# Patient Record
Sex: MALE | NOT HISPANIC OR LATINO
[De-identification: names, ages, dates, MRNs, and addresses within clinical notes are randomized per-mention and may not be internally consistent; named-entity substitution may affect disease eponyms.]

---

## 2017-04-19 ENCOUNTER — RX RENEWAL (OUTPATIENT)
Age: 62
End: 2017-04-19

## 2017-05-19 ENCOUNTER — APPOINTMENT (OUTPATIENT)
Dept: RHEUMATOLOGY | Facility: CLINIC | Age: 62
End: 2017-05-19

## 2017-05-19 VITALS
DIASTOLIC BLOOD PRESSURE: 98 MMHG | SYSTOLIC BLOOD PRESSURE: 144 MMHG | BODY MASS INDEX: 31.24 KG/M2 | OXYGEN SATURATION: 98 % | HEART RATE: 86 BPM | HEIGHT: 64 IN | WEIGHT: 183 LBS

## 2017-05-19 DIAGNOSIS — K05.10 CHRONIC GINGIVITIS, PLAQUE INDUCED: ICD-10-CM

## 2017-05-19 LAB
BASOPHILS # BLD AUTO: 0.03 K/UL
BASOPHILS NFR BLD AUTO: 0.5 %
EOSINOPHIL # BLD AUTO: 0.04 K/UL
EOSINOPHIL NFR BLD AUTO: 0.6 %
ERYTHROCYTE [SEDIMENTATION RATE] IN BLOOD BY WESTERGREN METHOD: 44 MM/HR
HCT VFR BLD CALC: 34.5 %
HGB BLD-MCNC: 10.4 G/DL
IMM GRANULOCYTES NFR BLD AUTO: 0.2 %
LYMPHOCYTES # BLD AUTO: 1.57 K/UL
LYMPHOCYTES NFR BLD AUTO: 24.8 %
MAN DIFF?: NORMAL
MCHC RBC-ENTMCNC: 23 PG
MCHC RBC-ENTMCNC: 30.1 GM/DL
MCV RBC AUTO: 76.2 FL
MONOCYTES # BLD AUTO: 0.49 K/UL
MONOCYTES NFR BLD AUTO: 7.8 %
NEUTROPHILS # BLD AUTO: 4.18 K/UL
NEUTROPHILS NFR BLD AUTO: 66.1 %
PLATELET # BLD AUTO: 197 K/UL
RBC # BLD: 4.53 M/UL
RBC # FLD: 17.9 %
WBC # FLD AUTO: 6.32 K/UL

## 2017-05-20 LAB
ALBUMIN SERPL ELPH-MCNC: 4.7 G/DL
ALP BLD-CCNC: 92 U/L
ALT SERPL-CCNC: 40 U/L
ANION GAP SERPL CALC-SCNC: 19 MMOL/L
AST SERPL-CCNC: 70 U/L
BILIRUB SERPL-MCNC: 0.5 MG/DL
BUN SERPL-MCNC: 10 MG/DL
CALCIUM SERPL-MCNC: 9.8 MG/DL
CHLORIDE SERPL-SCNC: 102 MMOL/L
CO2 SERPL-SCNC: 22 MMOL/L
CREAT SERPL-MCNC: 0.77 MG/DL
CRP SERPL-MCNC: 0.5 MG/DL
GLUCOSE SERPL-MCNC: 86 MG/DL
POTASSIUM SERPL-SCNC: 4.5 MMOL/L
PROT SERPL-MCNC: 8.3 G/DL
SODIUM SERPL-SCNC: 143 MMOL/L

## 2017-06-07 ENCOUNTER — RX RENEWAL (OUTPATIENT)
Age: 62
End: 2017-06-07

## 2017-06-07 LAB
ALBUMIN SERPL ELPH-MCNC: 4.1 G/DL
ALP BLD-CCNC: 86 U/L
ALT SERPL-CCNC: 39 U/L
AST SERPL-CCNC: 62 U/L
BILIRUB DIRECT SERPL-MCNC: 0.1 MG/DL
BILIRUB INDIRECT SERPL-MCNC: 0.2 MG/DL
BILIRUB SERPL-MCNC: 0.3 MG/DL
GGT SERPL-CCNC: 77 U/L
HAV IGM SER QL: NONREACTIVE
HBV CORE IGM SER QL: NONREACTIVE
HBV SURFACE AG SER QL: NONREACTIVE
HCV AB SER QL: NONREACTIVE
HCV S/CO RATIO: 0.2 S/CO
PROT SERPL-MCNC: 7.6 G/DL

## 2017-06-09 LAB
MITOCHONDRIA AB SER IF-ACNC: NORMAL
SMOOTH MUSCLE AB SER QL IF: NORMAL

## 2017-06-20 ENCOUNTER — RX RENEWAL (OUTPATIENT)
Age: 62
End: 2017-06-20

## 2017-06-21 ENCOUNTER — APPOINTMENT (OUTPATIENT)
Dept: ULTRASOUND IMAGING | Facility: CLINIC | Age: 62
End: 2017-06-21

## 2017-06-21 ENCOUNTER — OUTPATIENT (OUTPATIENT)
Dept: OUTPATIENT SERVICES | Facility: HOSPITAL | Age: 62
LOS: 1 days | End: 2017-06-21
Payer: COMMERCIAL

## 2017-06-21 DIAGNOSIS — Z00.8 ENCOUNTER FOR OTHER GENERAL EXAMINATION: ICD-10-CM

## 2017-06-21 PROCEDURE — 76700 US EXAM ABDOM COMPLETE: CPT

## 2017-11-27 ENCOUNTER — RX RENEWAL (OUTPATIENT)
Age: 62
End: 2017-11-27

## 2017-12-01 ENCOUNTER — APPOINTMENT (OUTPATIENT)
Dept: RHEUMATOLOGY | Facility: CLINIC | Age: 62
End: 2017-12-01

## 2017-12-06 ENCOUNTER — RX RENEWAL (OUTPATIENT)
Age: 62
End: 2017-12-06

## 2018-03-06 ENCOUNTER — OTHER (OUTPATIENT)
Age: 63
End: 2018-03-06

## 2018-05-18 ENCOUNTER — LABORATORY RESULT (OUTPATIENT)
Age: 63
End: 2018-05-18

## 2018-05-18 ENCOUNTER — APPOINTMENT (OUTPATIENT)
Dept: RHEUMATOLOGY | Facility: CLINIC | Age: 63
End: 2018-05-18
Payer: COMMERCIAL

## 2018-05-18 VITALS
SYSTOLIC BLOOD PRESSURE: 120 MMHG | OXYGEN SATURATION: 97 % | WEIGHT: 185 LBS | HEART RATE: 67 BPM | HEIGHT: 64 IN | BODY MASS INDEX: 31.58 KG/M2 | DIASTOLIC BLOOD PRESSURE: 70 MMHG

## 2018-05-18 PROCEDURE — 99214 OFFICE O/P EST MOD 30 MIN: CPT

## 2018-05-29 LAB
ALBUMIN SERPL ELPH-MCNC: 4.5 G/DL
ALP BLD-CCNC: 67 U/L
ALT SERPL-CCNC: 53 U/L
ANION GAP SERPL CALC-SCNC: 18 MMOL/L
AST SERPL-CCNC: 91 U/L
BASOPHILS # BLD AUTO: 0.17 K/UL
BASOPHILS NFR BLD AUTO: 3.8 %
BILIRUB SERPL-MCNC: 0.4 MG/DL
BUN SERPL-MCNC: 11 MG/DL
CALCIUM SERPL-MCNC: 9.2 MG/DL
CHLORIDE SERPL-SCNC: 102 MMOL/L
CO2 SERPL-SCNC: 22 MMOL/L
CREAT SERPL-MCNC: 0.78 MG/DL
CRP SERPL-MCNC: 0.2 MG/DL
EOSINOPHIL # BLD AUTO: 0.13 K/UL
EOSINOPHIL NFR BLD AUTO: 2.8 %
ERYTHROCYTE [SEDIMENTATION RATE] IN BLOOD BY WESTERGREN METHOD: 26 MM/HR
GLUCOSE SERPL-MCNC: 86 MG/DL
HCT VFR BLD CALC: 29.2 %
HGB BLD-MCNC: 8.1 G/DL
LYMPHOCYTES # BLD AUTO: 0.86 K/UL
LYMPHOCYTES NFR BLD AUTO: 18.9 %
MAN DIFF?: NORMAL
MCHC RBC-ENTMCNC: 18.5 PG
MCHC RBC-ENTMCNC: 27.7 GM/DL
MCV RBC AUTO: 66.7 FL
MONOCYTES # BLD AUTO: 0.3 K/UL
MONOCYTES NFR BLD AUTO: 6.6 %
NEUTROPHILS # BLD AUTO: 3.1 K/UL
NEUTROPHILS NFR BLD AUTO: 67.9 %
PLATELET # BLD AUTO: 196 K/UL
POTASSIUM SERPL-SCNC: 4.2 MMOL/L
PROT SERPL-MCNC: 7.8 G/DL
RBC # BLD: 4.38 M/UL
RBC # FLD: 19 %
SODIUM SERPL-SCNC: 142 MMOL/L
WBC # FLD AUTO: 4.57 K/UL

## 2018-06-13 ENCOUNTER — LABORATORY RESULT (OUTPATIENT)
Age: 63
End: 2018-06-13

## 2018-06-14 LAB
BASOPHILS # BLD AUTO: 0 K/UL
BASOPHILS NFR BLD AUTO: 0 %
EOSINOPHIL # BLD AUTO: 0.09 K/UL
EOSINOPHIL NFR BLD AUTO: 1.7 %
HCT VFR BLD CALC: 29.7 %
HGB BLD-MCNC: 8.2 G/DL
LYMPHOCYTES # BLD AUTO: 0.86 K/UL
LYMPHOCYTES NFR BLD AUTO: 15.7 %
MAN DIFF?: NORMAL
MCHC RBC-ENTMCNC: 18.2 PG
MCHC RBC-ENTMCNC: 27.6 GM/DL
MCV RBC AUTO: 66 FL
MONOCYTES # BLD AUTO: 0.33 K/UL
MONOCYTES NFR BLD AUTO: 6.1 %
NEUTROPHILS # BLD AUTO: 4.13 K/UL
NEUTROPHILS NFR BLD AUTO: 75.6 %
PLATELET # BLD AUTO: 170 K/UL
RBC # BLD: 4.5 M/UL
RBC # FLD: 19.5 %
WBC # FLD AUTO: 5.46 K/UL

## 2018-07-16 ENCOUNTER — RX RENEWAL (OUTPATIENT)
Age: 63
End: 2018-07-16

## 2019-02-04 ENCOUNTER — RX RENEWAL (OUTPATIENT)
Age: 64
End: 2019-02-04

## 2019-05-06 ENCOUNTER — RX RENEWAL (OUTPATIENT)
Age: 64
End: 2019-05-06

## 2019-06-03 ENCOUNTER — APPOINTMENT (OUTPATIENT)
Dept: RHEUMATOLOGY | Facility: CLINIC | Age: 64
End: 2019-06-03
Payer: COMMERCIAL

## 2019-06-03 ENCOUNTER — LABORATORY RESULT (OUTPATIENT)
Age: 64
End: 2019-06-03

## 2019-06-03 VITALS
HEART RATE: 70 BPM | DIASTOLIC BLOOD PRESSURE: 75 MMHG | WEIGHT: 185 LBS | SYSTOLIC BLOOD PRESSURE: 133 MMHG | HEIGHT: 64 IN | OXYGEN SATURATION: 96 % | BODY MASS INDEX: 31.58 KG/M2

## 2019-06-03 PROCEDURE — 36415 COLL VENOUS BLD VENIPUNCTURE: CPT

## 2019-06-03 PROCEDURE — 99214 OFFICE O/P EST MOD 30 MIN: CPT | Mod: 25

## 2019-06-04 ENCOUNTER — RX RENEWAL (OUTPATIENT)
Age: 64
End: 2019-06-04

## 2019-06-04 LAB
25(OH)D3 SERPL-MCNC: 8.8 NG/ML
ALBUMIN SERPL ELPH-MCNC: 4.6 G/DL
ALP BLD-CCNC: 105 U/L
ALT SERPL-CCNC: 39 U/L
ANION GAP SERPL CALC-SCNC: 14 MMOL/L
AST SERPL-CCNC: 82 U/L
BASOPHILS # BLD AUTO: 0.12 K/UL
BASOPHILS NFR BLD AUTO: 2.7 %
BILIRUB SERPL-MCNC: 0.2 MG/DL
BUN SERPL-MCNC: 10 MG/DL
CALCIUM SERPL-MCNC: 9.4 MG/DL
CHLORIDE SERPL-SCNC: 104 MMOL/L
CO2 SERPL-SCNC: 23 MMOL/L
CREAT SERPL-MCNC: 0.6 MG/DL
CRP SERPL-MCNC: 0.43 MG/DL
EOSINOPHIL # BLD AUTO: 0.04 K/UL
EOSINOPHIL NFR BLD AUTO: 0.9 %
ERYTHROCYTE [SEDIMENTATION RATE] IN BLOOD BY WESTERGREN METHOD: 41 MM/HR
GLUCOSE SERPL-MCNC: 93 MG/DL
HAV IGM SER QL: NONREACTIVE
HBV CORE IGM SER QL: NONREACTIVE
HBV SURFACE AG SER QL: NONREACTIVE
HCT VFR BLD CALC: 32.6 %
HCV AB SER QL: NONREACTIVE
HCV S/CO RATIO: 0.23 S/CO
HGB BLD-MCNC: 9.7 G/DL
LYMPHOCYTES # BLD AUTO: 1.14 K/UL
LYMPHOCYTES NFR BLD AUTO: 26.5 %
MAN DIFF?: NORMAL
MCHC RBC-ENTMCNC: 22.7 PG
MCHC RBC-ENTMCNC: 29.8 GM/DL
MCV RBC AUTO: 76.3 FL
MONOCYTES # BLD AUTO: 0.23 K/UL
MONOCYTES NFR BLD AUTO: 5.3 %
NEUTROPHILS # BLD AUTO: 2.78 K/UL
NEUTROPHILS NFR BLD AUTO: 64.6 %
PLATELET # BLD AUTO: 178 K/UL
POTASSIUM SERPL-SCNC: 4.1 MMOL/L
PROT SERPL-MCNC: 7.5 G/DL
RBC # BLD: 4.27 M/UL
RBC # FLD: 20.2 %
SODIUM SERPL-SCNC: 141 MMOL/L
URATE SERPL-MCNC: 5.5 MG/DL
WBC # FLD AUTO: 4.31 K/UL

## 2019-06-04 NOTE — HISTORY OF PRESENT ILLNESS
[FreeTextEntry1] : f/u 1 year\par Dandy is a 61-year-old patient, with  diagnosis of psoriatic arthritis. He is a pt of Dr. Moran. He preferred to stay away from the methotrexate and preferred using a more benign drug like sulfasalazine. He Is on combination of SSZ and otezla. \par He feels that otezla has worked very well for him.   He rates his joint pain 1/10 but less than 15 minutes of morning stiffness and no fatigue.  He prefers to stay on the sulfasalazine because he tried to taper it down to 2 and a joint pain began to return.\par He has seen GI and had a recent colonoscopy which as per the pt was normal. \par He c/o pain in his right wrist, he thinks it was swollen. He is now working locally and is happy about that. \par He has cut down on mobic and use sit sparingly. On my initial evaluation I noted that his ALT was elevated, his GGT was elevated, he did see his primary care physician at that time and had a sonogram of the liver, he states he was told he has a fatty liver. He does admit to having 2 alcoholic beverages, usually whiskey every night.\par \par He denies prolonged morning stiffness. He denies any other issues with his help. He has been doing well otherwise\par He has a good appetite and denies wt loss.

## 2019-06-04 NOTE — PHYSICAL EXAM
[General Appearance - In No Acute Distress] : in no acute distress [General Appearance - Alert] : alert [Sclera] : the sclera and conjunctiva were normal [PERRL With Normal Accommodation] : pupils were equal in size, round, and reactive to light [Extraocular Movements] : extraocular movements were intact [Outer Ear] : the ears and nose were normal in appearance [Oropharynx] : the oropharynx was normal [Neck Cervical Mass (___cm)] : no neck mass was observed [Neck Appearance] : the appearance of the neck was normal [Jugular Venous Distention Increased] : there was no jugular-venous distention [Thyroid Diffuse Enlargement] : the thyroid was not enlarged [Thyroid Nodule] : there were no palpable thyroid nodules [Auscultation Breath Sounds / Voice Sounds] : lungs were clear to auscultation bilaterally [Heart Rate And Rhythm] : heart rate was normal and rhythm regular [Heart Sounds Gallop] : no gallops [Heart Sounds] : normal S1 and S2 [Murmurs] : no murmurs [Heart Sounds Pericardial Friction Rub] : no pericardial rub [Edema] : there was no peripheral edema [Full Pulse] : the pedal pulses are present [Bowel Sounds] : normal bowel sounds [Abdomen Soft] : soft [Abdomen Tenderness] : non-tender [] : no hepato-splenomegaly [Cervical Lymph Nodes Enlarged Posterior Bilaterally] : posterior cervical [Abdomen Mass (___ Cm)] : no abdominal mass palpated [Cervical Lymph Nodes Enlarged Anterior Bilaterally] : anterior cervical [Supraclavicular Lymph Nodes Enlarged Bilaterally] : supraclavicular [No CVA Tenderness] : no ~M costovertebral angle tenderness [Musculoskeletal - Swelling] : no joint swelling seen [FreeTextEntry1] : + psoriasis elbows. No nail pitting [Sensation] : the sensory exam was normal to light touch and pinprick [Motor Exam] : the motor exam was normal [No Focal Deficits] : no focal deficits [Oriented To Time, Place, And Person] : oriented to person, place, and time [Impaired Insight] : insight and judgment were intact [Affect] : the affect was normal

## 2019-06-04 NOTE — REVIEW OF SYSTEMS
[Fever] : no fever [Chills] : no chills [Feeling Poorly] : not feeling poorly [Feeling Tired] : not feeling tired [Recent Weight Gain (___ Lbs)] : no recent weight gain [Recent Weight Loss (___ Lbs)] : no recent weight loss [Eye Pain] : no eye pain [Red Eyes] : eyes not red [Eyesight Problems] : no eyesight problems [Discharge From Eyes] : no purulent discharge from the eyes [Dry Eyes] : no dryness of the eyes [Eyes Itch] : no itching of the eyes [Earache] : no earache [Loss Of Hearing] : no hearing loss [Heart Rate Is Slow] : the heart rate was not slow [Heart Rate Is Fast] : the heart rate was not fast [Chest Pain] : no chest pain [Palpitations] : no palpitations [Leg Claudication] : no intermittent leg claudication [Lower Ext Edema] : no extremity edema [Shortness Of Breath] : no shortness of breath [Cough] : no cough [Wheezing] : no wheezing [SOB on Exertion] : no shortness of breath during exertion [PND] : no PND [Orthopnea] : no orthopnea [Abdominal Pain] : abdominal pain [Vomiting] : no vomiting [Diarrhea] : diarrhea [Constipation] : no constipation [Heartburn] : no heartburn [Melena] : no melena [Arthralgias] : arthralgias [Joint Pain] : no joint pain [Joint Swelling] : no joint swelling [Joint Stiffness] : no joint stiffness [Itching] : no itching [Negative] : Heme/Lymph [de-identified] : much improved. Psoriasis

## 2019-06-04 NOTE — ASSESSMENT
[FreeTextEntry1] : 63 year old male with PSA presenting for routine visit today.\par \par PSA-\par he is on a combination of SSZ and otezla. Labs to be done today. He is using SSZ at 1 gm and otezla bid. Labs to be done today. He has joint activity today and his right wrist is active, to have xray as well. Will consider switching meds. \par \par Gingivitis-\par he is on prophylactic doxycycline\par \par NSAID use-\par Risks and benefits of NSAIDS were d/w patient including but not limited to hypertension, nephrotoxicity, GI intolerance , increased risk for thromboembolism, CHF, hepatotoxicity and anemia\par \par ELEvated LFTs-\par as per pt he was dx with fatty liver, he does consume ETOH on a daily basis. \par \par Anemia-\par he has ch anemia, states that he has recently had GI w/u I have requested records. If need be will obtain hematology input as well. \par \par f/u 3 months. He is aware to call if his sx worsen.

## 2019-06-05 LAB
M TB IFN-G BLD-IMP: NEGATIVE
QUANTIFERON TB PLUS MITOGEN MINUS NIL: 4.29 IU/ML
QUANTIFERON TB PLUS NIL: 0.05 IU/ML
QUANTIFERON TB PLUS TB1 MINUS NIL: 0 IU/ML
QUANTIFERON TB PLUS TB2 MINUS NIL: 0 IU/ML

## 2019-06-09 ENCOUNTER — FORM ENCOUNTER (OUTPATIENT)
Age: 64
End: 2019-06-09

## 2019-06-10 ENCOUNTER — APPOINTMENT (OUTPATIENT)
Dept: RADIOLOGY | Facility: CLINIC | Age: 64
End: 2019-06-10
Payer: COMMERCIAL

## 2019-06-10 ENCOUNTER — OUTPATIENT (OUTPATIENT)
Dept: OUTPATIENT SERVICES | Facility: HOSPITAL | Age: 64
LOS: 1 days | End: 2019-06-10
Payer: COMMERCIAL

## 2019-06-10 DIAGNOSIS — Z00.8 ENCOUNTER FOR OTHER GENERAL EXAMINATION: ICD-10-CM

## 2019-06-10 PROCEDURE — 73110 X-RAY EXAM OF WRIST: CPT

## 2019-06-10 PROCEDURE — 71046 X-RAY EXAM CHEST 2 VIEWS: CPT

## 2019-06-10 PROCEDURE — 73110 X-RAY EXAM OF WRIST: CPT | Mod: 26,RT

## 2019-06-10 PROCEDURE — 71046 X-RAY EXAM CHEST 2 VIEWS: CPT | Mod: 26

## 2019-07-19 ENCOUNTER — MOBILE ON CALL (OUTPATIENT)
Age: 64
End: 2019-07-19

## 2019-07-22 ENCOUNTER — RX RENEWAL (OUTPATIENT)
Age: 64
End: 2019-07-22

## 2019-08-13 ENCOUNTER — MEDICATION RENEWAL (OUTPATIENT)
Age: 64
End: 2019-08-13

## 2019-09-09 ENCOUNTER — APPOINTMENT (OUTPATIENT)
Dept: RHEUMATOLOGY | Facility: CLINIC | Age: 64
End: 2019-09-09
Payer: COMMERCIAL

## 2019-09-09 VITALS
BODY MASS INDEX: 29.37 KG/M2 | OXYGEN SATURATION: 97 % | HEIGHT: 64 IN | WEIGHT: 172 LBS | SYSTOLIC BLOOD PRESSURE: 143 MMHG | HEART RATE: 68 BPM | DIASTOLIC BLOOD PRESSURE: 81 MMHG

## 2019-09-09 PROCEDURE — 99214 OFFICE O/P EST MOD 30 MIN: CPT

## 2019-09-09 NOTE — PHYSICAL EXAM
[General Appearance - Alert] : alert [General Appearance - In No Acute Distress] : in no acute distress [Sclera] : the sclera and conjunctiva were normal [PERRL With Normal Accommodation] : pupils were equal in size, round, and reactive to light [Extraocular Movements] : extraocular movements were intact [Outer Ear] : the ears and nose were normal in appearance [Oropharynx] : the oropharynx was normal [Neck Appearance] : the appearance of the neck was normal [Neck Cervical Mass (___cm)] : no neck mass was observed [Jugular Venous Distention Increased] : there was no jugular-venous distention [Thyroid Diffuse Enlargement] : the thyroid was not enlarged [Thyroid Nodule] : there were no palpable thyroid nodules [Auscultation Breath Sounds / Voice Sounds] : lungs were clear to auscultation bilaterally [Heart Rate And Rhythm] : heart rate was normal and rhythm regular [Heart Sounds] : normal S1 and S2 [Heart Sounds Gallop] : no gallops [Murmurs] : no murmurs [Heart Sounds Pericardial Friction Rub] : no pericardial rub [Full Pulse] : the pedal pulses are present [Edema] : there was no peripheral edema [Bowel Sounds] : normal bowel sounds [Abdomen Soft] : soft [Abdomen Tenderness] : non-tender [] : no hepato-splenomegaly [Abdomen Mass (___ Cm)] : no abdominal mass palpated [Cervical Lymph Nodes Enlarged Posterior Bilaterally] : posterior cervical [Cervical Lymph Nodes Enlarged Anterior Bilaterally] : anterior cervical [Supraclavicular Lymph Nodes Enlarged Bilaterally] : supraclavicular [No CVA Tenderness] : no ~M costovertebral angle tenderness [Musculoskeletal - Swelling] : no joint swelling seen [FreeTextEntry1] : + psoriasis elbows. No nail pitting [Sensation] : the sensory exam was normal to light touch and pinprick [Motor Exam] : the motor exam was normal [No Focal Deficits] : no focal deficits [Oriented To Time, Place, And Person] : oriented to person, place, and time [Impaired Insight] : insight and judgment were intact [Affect] : the affect was normal

## 2019-09-09 NOTE — ASSESSMENT
[FreeTextEntry1] : 64 year old male with PSA presenting for routine visit today.\par \par PSA-\par he is on a combination of SSZ and otezla. Labs to be done today. He is using SSZ at 1 gm and otezla bid. Labs to be done today. He has joint activity today and his right wrist is active, he defers switching meds\par \par Gingivitis-\par he is on prophylactic doxycycline\par \par NSAID use-\par Risks and benefits of NSAIDS were d/w patient including but not limited to hypertension, nephrotoxicity, GI intolerance , increased risk for thromboembolism, CHF, hepatotoxicity and anemia\par \par ELEvated LFTs-\par as per pt he was dx with fatty liver, he does consume ETOH on a daily basis. \par \par Anemia-\par he has ch anemia, states that he has recently had GI w/u which was normal. he may have thalessemia trait based on low MCV, to see hematology for completion. \par \par f/u 4 months. He is aware to call if his sx worsen.

## 2019-09-09 NOTE — REVIEW OF SYSTEMS
[Fever] : no fever [Chills] : no chills [Feeling Poorly] : not feeling poorly [Feeling Tired] : not feeling tired [Recent Weight Gain (___ Lbs)] : no recent weight gain [Recent Weight Loss (___ Lbs)] : no recent weight loss [Eye Pain] : no eye pain [Red Eyes] : eyes not red [Eyesight Problems] : no eyesight problems [Discharge From Eyes] : no purulent discharge from the eyes [Dry Eyes] : no dryness of the eyes [Eyes Itch] : no itching of the eyes [Earache] : no earache [Loss Of Hearing] : no hearing loss [Heart Rate Is Slow] : the heart rate was not slow [Heart Rate Is Fast] : the heart rate was not fast [Chest Pain] : no chest pain [Palpitations] : no palpitations [Leg Claudication] : no intermittent leg claudication [Lower Ext Edema] : no extremity edema [Shortness Of Breath] : no shortness of breath [Wheezing] : no wheezing [Cough] : no cough [SOB on Exertion] : no shortness of breath during exertion [Orthopnea] : no orthopnea [PND] : no PND [Abdominal Pain] : abdominal pain [Vomiting] : no vomiting [Constipation] : no constipation [Diarrhea] : diarrhea [Heartburn] : no heartburn [Melena] : no melena [Arthralgias] : arthralgias [Joint Pain] : no joint pain [Joint Swelling] : no joint swelling [Joint Stiffness] : no joint stiffness [Itching] : no itching [Negative] : Heme/Lymph [de-identified] : much improved. Psoriasis

## 2019-09-09 NOTE — HISTORY OF PRESENT ILLNESS
[FreeTextEntry1] : f/u 3 months\par Dandy is a 64-year-old patient, with  diagnosis of psoriatic arthritis. He is a pt of Dr. Moran. He preferred to stay away from the methotrexate and preferred using a more benign drug like sulfasalazine. He Is on combination of SSZ and otezla. \par He feels that otezla has worked very well for him.   He rates his joint pain 1/10 but less than 15 minutes of morning stiffness and no fatigue.  He prefers to stay on the sulfasalazine because he tried to taper it down to 2 and a joint pain began to return.\par He has seen GI and had a recent colonoscopy which as per the pt was normal. \par He c/o pain in his right wrist, but it is manageable He is now working locally and is happy about that. \par He has cut down on mobic and use sit sparingly. On my initial evaluation I noted that his ALT was elevated, his GGT was elevated, he did see his primary care physician at that time and had a sonogram of the liver, he states he was told he has a fatty liver. He does admit to having 2 alcoholic beverages, usually whiskey every night.\par \par He denies prolonged morning stiffness. He denies any other issues with his help. He has been doing well otherwise\par He has a good appetite and denies wt loss.

## 2019-09-23 ENCOUNTER — APPOINTMENT (OUTPATIENT)
Dept: RHEUMATOLOGY | Facility: CLINIC | Age: 64
End: 2019-09-23

## 2019-10-17 ENCOUNTER — APPOINTMENT (OUTPATIENT)
Age: 64
End: 2019-10-17
Payer: COMMERCIAL

## 2019-10-17 VITALS
BODY MASS INDEX: 30.05 KG/M2 | SYSTOLIC BLOOD PRESSURE: 172 MMHG | WEIGHT: 176 LBS | RESPIRATION RATE: 16 BRPM | HEIGHT: 64 IN | TEMPERATURE: 98.6 F | HEART RATE: 76 BPM | DIASTOLIC BLOOD PRESSURE: 81 MMHG

## 2019-10-17 DIAGNOSIS — Z87.891 PERSONAL HISTORY OF NICOTINE DEPENDENCE: ICD-10-CM

## 2019-10-17 PROCEDURE — 99244 OFF/OP CNSLTJ NEW/EST MOD 40: CPT

## 2019-10-17 NOTE — REASON FOR VISIT
[Initial Consultation] : an initial consultation for [FreeTextEntry2] : microcytic anemia; rule out thalassemia

## 2019-10-17 NOTE — CONSULT LETTER
[Consult Letter:] : I had the pleasure of evaluating your patient, [unfilled]. [Dear  ___] : Dear  [unfilled], [Consult Closing:] : Thank you very much for allowing me to participate in the care of this patient.  If you have any questions, please do not hesitate to contact me. [Please see my note below.] : Please see my note below. [Sincerely,] : Sincerely, [FreeTextEntry2] : Chavez Mcdonald D.O.  [FreeTextEntry3] : MART PERALTA M.D.\par Hematology/ Oncology\par [facility] [place] [facility] [place]\par [facility]\par [address] [place] [address], [facility]\par [place] [address]\par Telephone: [phone]\par FAX: [phone]\par \par \par

## 2019-10-17 NOTE — ASSESSMENT
[FreeTextEntry1] : Mr. MARTINI and his spouse 's questions were answered to their satisfaction. He  expressed his  understanding and willingness to comply with the above recommendations, and  will return to the office to review the results of the blood tests in 2 weeks.\par \par

## 2019-10-17 NOTE — PHYSICAL EXAM
[Fully active, able to carry on all pre-disease performance without restriction] : Status 0 - Fully active, able to carry on all pre-disease performance without restriction [Normal] : affect appropriate [de-identified] : left hand arthritic mild deformities

## 2019-10-17 NOTE — HISTORY OF PRESENT ILLNESS
[de-identified] : 64 M with history of psoriatic arthritis since age 54, with previous exposure to methotrexate, currently on combination treatment with Sulfasalazine and Otezla. Though his arthritic pain is under control, patient presents with microcytic anemia (hemoglobin fluctuant between 8.2 and 10.4 g/dL, and MCV between 66.0 and 76.3). No ferritin available for review however patient presents with slight elevation in SGOT (62-91) since May 2017. Sonogram of the liver consistent with hepatosteatosis. Renal profile is in normal range. Recent colonoscopy was normal. Patient's ethnic background is [place] and [place]. Denies family history of anemia. [FreeTextEntry1] : expectant surveillance\par \par \par

## 2019-10-18 ENCOUNTER — LABORATORY RESULT (OUTPATIENT)
Age: 64
End: 2019-10-18

## 2019-10-24 ENCOUNTER — OUTPATIENT (OUTPATIENT)
Dept: OUTPATIENT SERVICES | Facility: HOSPITAL | Age: 64
LOS: 1 days | End: 2019-10-24
Payer: COMMERCIAL

## 2019-10-24 VITALS
TEMPERATURE: 98 F | WEIGHT: 180.12 LBS | SYSTOLIC BLOOD PRESSURE: 157 MMHG | DIASTOLIC BLOOD PRESSURE: 84 MMHG | HEART RATE: 67 BPM

## 2019-10-24 DIAGNOSIS — R69 ILLNESS, UNSPECIFIED: ICD-10-CM

## 2019-10-24 PROCEDURE — 86340 INTRINSIC FACTOR ANTIBODY: CPT

## 2019-10-24 PROCEDURE — 36415 COLL VENOUS BLD VENIPUNCTURE: CPT

## 2019-10-24 PROCEDURE — 86255 FLUORESCENT ANTIBODY SCREEN: CPT

## 2019-10-24 PROCEDURE — 96372 THER/PROPH/DIAG INJ SC/IM: CPT

## 2019-10-24 RX ORDER — PREGABALIN 225 MG/1
1000 CAPSULE ORAL ONCE
Refills: 0 | Status: COMPLETED | OUTPATIENT
Start: 2019-10-24 | End: 2019-10-24

## 2019-10-24 RX ADMIN — PREGABALIN 1000 MICROGRAM(S): 225 CAPSULE ORAL at 13:58

## 2019-10-25 DIAGNOSIS — R69 ILLNESS, UNSPECIFIED: ICD-10-CM

## 2019-10-25 DIAGNOSIS — E53.8 DEFICIENCY OF OTHER SPECIFIED B GROUP VITAMINS: ICD-10-CM

## 2019-10-25 LAB
IF BLOCK AB SER-ACNC: SIGNIFICANT CHANGE UP
PCA AB SER-ACNC: SIGNIFICANT CHANGE UP

## 2019-10-31 ENCOUNTER — OUTPATIENT (OUTPATIENT)
Dept: OUTPATIENT SERVICES | Facility: HOSPITAL | Age: 64
LOS: 1 days | End: 2019-10-31
Payer: COMMERCIAL

## 2019-10-31 VITALS — DIASTOLIC BLOOD PRESSURE: 84 MMHG | TEMPERATURE: 99 F | HEART RATE: 67 BPM | SYSTOLIC BLOOD PRESSURE: 135 MMHG

## 2019-10-31 DIAGNOSIS — E53.8 DEFICIENCY OF OTHER SPECIFIED B GROUP VITAMINS: ICD-10-CM

## 2019-10-31 DIAGNOSIS — R69 ILLNESS, UNSPECIFIED: ICD-10-CM

## 2019-10-31 PROCEDURE — 96372 THER/PROPH/DIAG INJ SC/IM: CPT

## 2019-10-31 RX ORDER — PREGABALIN 225 MG/1
1000 CAPSULE ORAL ONCE
Refills: 0 | Status: COMPLETED | OUTPATIENT
Start: 2019-10-31 | End: 2019-10-31

## 2019-10-31 RX ADMIN — PREGABALIN 1000 MICROGRAM(S): 225 CAPSULE ORAL at 14:25

## 2019-11-04 RX ORDER — PREGABALIN 225 MG/1
1000 CAPSULE ORAL ONCE
Refills: 0 | Status: COMPLETED | OUTPATIENT
Start: 2019-11-07 | End: 2019-11-07

## 2019-11-07 ENCOUNTER — OUTPATIENT (OUTPATIENT)
Dept: OUTPATIENT SERVICES | Facility: HOSPITAL | Age: 64
LOS: 1 days | End: 2019-11-07
Payer: COMMERCIAL

## 2019-11-07 VITALS
HEIGHT: 64 IN | SYSTOLIC BLOOD PRESSURE: 166 MMHG | DIASTOLIC BLOOD PRESSURE: 91 MMHG | TEMPERATURE: 98 F | HEART RATE: 76 BPM | WEIGHT: 177.69 LBS

## 2019-11-07 DIAGNOSIS — E53.8 DEFICIENCY OF OTHER SPECIFIED B GROUP VITAMINS: ICD-10-CM

## 2019-11-07 DIAGNOSIS — R69 ILLNESS, UNSPECIFIED: ICD-10-CM

## 2019-11-07 PROCEDURE — 96372 THER/PROPH/DIAG INJ SC/IM: CPT

## 2019-11-07 RX ADMIN — PREGABALIN 1000 MICROGRAM(S): 225 CAPSULE ORAL at 14:11

## 2019-11-08 RX ORDER — PREGABALIN 225 MG/1
1000 CAPSULE ORAL ONCE
Refills: 0 | Status: COMPLETED | OUTPATIENT
Start: 2019-11-14 | End: 2019-11-14

## 2019-11-14 ENCOUNTER — OUTPATIENT (OUTPATIENT)
Dept: OUTPATIENT SERVICES | Facility: HOSPITAL | Age: 64
LOS: 1 days | End: 2019-11-14
Payer: COMMERCIAL

## 2019-11-14 VITALS
TEMPERATURE: 99 F | HEIGHT: 64 IN | WEIGHT: 175.71 LBS | RESPIRATION RATE: 16 BRPM | HEART RATE: 73 BPM | SYSTOLIC BLOOD PRESSURE: 146 MMHG | DIASTOLIC BLOOD PRESSURE: 87 MMHG

## 2019-11-14 DIAGNOSIS — R69 ILLNESS, UNSPECIFIED: ICD-10-CM

## 2019-11-14 DIAGNOSIS — E53.8 DEFICIENCY OF OTHER SPECIFIED B GROUP VITAMINS: ICD-10-CM

## 2019-11-14 PROCEDURE — 96372 THER/PROPH/DIAG INJ SC/IM: CPT

## 2019-11-14 RX ADMIN — PREGABALIN 1000 MICROGRAM(S): 225 CAPSULE ORAL at 14:23

## 2019-11-20 ENCOUNTER — RX RENEWAL (OUTPATIENT)
Age: 64
End: 2019-11-20

## 2019-11-20 LAB
ALBUMIN SERPL ELPH-MCNC: 4.3 G/DL
ALP BLD-CCNC: 100 U/L
ALT SERPL-CCNC: 31 U/L
ANION GAP SERPL CALC-SCNC: 16 MMOL/L
AST SERPL-CCNC: 64 U/L
B2 MICROGLOB SERPL-MCNC: 2.1 MG/L
BASOPHILS # BLD AUTO: 0.03 K/UL
BASOPHILS NFR BLD AUTO: 0.7 %
BILIRUB SERPL-MCNC: 0.2 MG/DL
BUN SERPL-MCNC: 7 MG/DL
CALCIUM SERPL-MCNC: 9.6 MG/DL
CHLORIDE SERPL-SCNC: 102 MMOL/L
CO2 SERPL-SCNC: 24 MMOL/L
CREAT SERPL-MCNC: 0.66 MG/DL
EOSINOPHIL # BLD AUTO: 0.04 K/UL
EOSINOPHIL NFR BLD AUTO: 0.9 %
FERRITIN SERPL-MCNC: 15 NG/ML
FOLATE SERPL-MCNC: 3.2 NG/ML
GLUCOSE SERPL-MCNC: 110 MG/DL
HCT VFR BLD CALC: 32.7 %
HGB BLD-MCNC: 10.1 G/DL
IMM GRANULOCYTES NFR BLD AUTO: 0.2 %
LYMPHOCYTES # BLD AUTO: 1.21 K/UL
LYMPHOCYTES NFR BLD AUTO: 27.1 %
MAN DIFF?: NORMAL
MCHC RBC-ENTMCNC: 25.4 PG
MCHC RBC-ENTMCNC: 30.9 GM/DL
MCV RBC AUTO: 82.4 FL
MONOCYTES # BLD AUTO: 0.4 K/UL
MONOCYTES NFR BLD AUTO: 9 %
NEUTROPHILS # BLD AUTO: 2.77 K/UL
NEUTROPHILS NFR BLD AUTO: 62.1 %
PLATELET # BLD AUTO: 194 K/UL
POTASSIUM SERPL-SCNC: 4.4 MMOL/L
PROT SERPL-MCNC: 7.5 G/DL
RBC # BLD: 3.97 M/UL
RBC # FLD: 17.5 %
SODIUM SERPL-SCNC: 142 MMOL/L
VIT B12 SERPL-MCNC: 595 PG/ML
WBC # FLD AUTO: 4.46 K/UL

## 2019-11-21 ENCOUNTER — RX RENEWAL (OUTPATIENT)
Age: 64
End: 2019-11-21

## 2019-11-25 ENCOUNTER — APPOINTMENT (OUTPATIENT)
Age: 64
End: 2019-11-25
Payer: COMMERCIAL

## 2019-11-25 VITALS
WEIGHT: 177 LBS | DIASTOLIC BLOOD PRESSURE: 86 MMHG | BODY MASS INDEX: 30.22 KG/M2 | SYSTOLIC BLOOD PRESSURE: 160 MMHG | HEART RATE: 76 BPM | RESPIRATION RATE: 16 BRPM | TEMPERATURE: 98.8 F | HEIGHT: 64 IN

## 2019-11-25 PROCEDURE — 99213 OFFICE O/P EST LOW 20 MIN: CPT

## 2019-11-25 NOTE — HISTORY OF PRESENT ILLNESS
[de-identified] : 64 M with history of psoriatic arthritis x 10 years, and B12 deficiency anemia .\par \par  [FreeTextEntry1] : B 12 injections\par \par \par  [de-identified] : Here for followup; initially seen in the office October 17, 2019 for microcytic anemia. Was found with vitamin B12 deficiency and receives subcutaneous vitamin B12 injections weekly x4. Patient also has low folate level (3.2) and low serum ferritin (15 ng per mL). He had EGD/colonoscopy/capsular endoscopy done by Dr. Hussein Kauffman- all reportedly negative studies. Reports no changes in clinical status.

## 2019-11-25 NOTE — PHYSICAL EXAM
[Fully active, able to carry on all pre-disease performance without restriction] : Status 0 - Fully active, able to carry on all pre-disease performance without restriction [Normal] : normal appearance, no rash, nodules, vesicles, ulcers, erythema [de-identified] : left hand arthritic mild deformities

## 2019-11-25 NOTE — ASSESSMENT
[FreeTextEntry1] : Mr. MARTINI and his spouse 's questions were answered to their satisfaction. He  expressed his  understanding and willingness to comply with the above recommendations, and  will return to the office to review the results of the blood tests in 2 months.\par \par

## 2020-01-06 ENCOUNTER — APPOINTMENT (OUTPATIENT)
Dept: RHEUMATOLOGY | Facility: CLINIC | Age: 65
End: 2020-01-06
Payer: COMMERCIAL

## 2020-01-06 VITALS
HEART RATE: 74 BPM | DIASTOLIC BLOOD PRESSURE: 70 MMHG | BODY MASS INDEX: 30.04 KG/M2 | WEIGHT: 175 LBS | OXYGEN SATURATION: 99 % | SYSTOLIC BLOOD PRESSURE: 145 MMHG

## 2020-01-06 PROCEDURE — 99213 OFFICE O/P EST LOW 20 MIN: CPT

## 2020-01-06 NOTE — PHYSICAL EXAM
[General Appearance - Alert] : alert [Sclera] : the sclera and conjunctiva were normal [General Appearance - In No Acute Distress] : in no acute distress [PERRL With Normal Accommodation] : pupils were equal in size, round, and reactive to light [Extraocular Movements] : extraocular movements were intact [Oropharynx] : the oropharynx was normal [Outer Ear] : the ears and nose were normal in appearance [Neck Appearance] : the appearance of the neck was normal [Neck Cervical Mass (___cm)] : no neck mass was observed [Jugular Venous Distention Increased] : there was no jugular-venous distention [Thyroid Diffuse Enlargement] : the thyroid was not enlarged [Thyroid Nodule] : there were no palpable thyroid nodules [Auscultation Breath Sounds / Voice Sounds] : lungs were clear to auscultation bilaterally [Heart Rate And Rhythm] : heart rate was normal and rhythm regular [Heart Sounds] : normal S1 and S2 [Heart Sounds Gallop] : no gallops [Murmurs] : no murmurs [Heart Sounds Pericardial Friction Rub] : no pericardial rub [Full Pulse] : the pedal pulses are present [Edema] : there was no peripheral edema [Bowel Sounds] : normal bowel sounds [Abdomen Soft] : soft [Abdomen Tenderness] : non-tender [Abdomen Mass (___ Cm)] : no abdominal mass palpated [] : no hepato-splenomegaly [Cervical Lymph Nodes Enlarged Posterior Bilaterally] : posterior cervical [Supraclavicular Lymph Nodes Enlarged Bilaterally] : supraclavicular [Cervical Lymph Nodes Enlarged Anterior Bilaterally] : anterior cervical [Musculoskeletal - Swelling] : no joint swelling seen [No CVA Tenderness] : no ~M costovertebral angle tenderness [FreeTextEntry1] : + psoriasis elbows. No nail pitting [Sensation] : the sensory exam was normal to light touch and pinprick [Motor Exam] : the motor exam was normal [No Focal Deficits] : no focal deficits [Oriented To Time, Place, And Person] : oriented to person, place, and time [Impaired Insight] : insight and judgment were intact [Affect] : the affect was normal

## 2020-01-06 NOTE — ASSESSMENT
[FreeTextEntry1] : 64 year old male with PSA presenting for routine visit today.\par \par PSA-\par he is on a combination of SSZ and otezla. Labs to be done today. He is using SSZ at 1 gm and otezla bid. Labs to be done today. He has joint activity today and his right wrist is active, he defers switching meds\par \par Gingivitis-\par he is on prophylactic doxycycline\par \par NSAID use-\par Risks and benefits of NSAIDS were d/w patient including but not limited to hypertension, nephrotoxicity, GI intolerance , increased risk for thromboembolism, CHF, hepatotoxicity and anemia\par \par ELEvated LFTs-\par as per pt he was dx with fatty liver, he does consume ETOH on a daily basis. \par \par Anemia-\par he has ch anemia, states that he has recently had GI w/u which was normal. he may have thalessemia trait based on low MCV, he has been dx with B12 def as well \par \par f/u 4 months. He is aware to call if his sx worsen.

## 2020-01-06 NOTE — HISTORY OF PRESENT ILLNESS
[FreeTextEntry1] : f/u 4 months\par Dandy is a 64-year-old patient, with  diagnosis of psoriatic arthritis. He is a pt of Dr. Moran. He preferred to stay away from the methotrexate and preferred using a more benign drug like sulfasalazine. He Is on combination of SSZ and otezla. \par He feels that otezla has worked very well for him.   He rates his joint pain 1/10 but less than 15 minutes of morning stiffness and no fatigue.  He prefers to stay on the sulfasalazine because he tried to taper it down to 2 and a joint pain began to return.\par He has seen GI and had a recent colonoscopy which as per the pt was normal. \par On my initial evaluation I noted that his ALT was elevated, his GGT was elevated, he did see his primary care physician at that time and had a sonogram of the liver, he states he was told he has a fatty liver. He does admit to having 2 alcoholic beverages, usually whiskey every night.\par He has been seeing heamtology and has had B12 injections\par He denies prolonged morning stiffness. He denies any other issues with his help. He has been doing well otherwise, his right hand bother mirtha he has trigger fingers with extensive tenosynovitis but defers surgical intervention\par He has a good appetite and denies wt loss.

## 2020-01-06 NOTE — REVIEW OF SYSTEMS
[Fever] : no fever [Chills] : no chills [Feeling Poorly] : not feeling poorly [Feeling Tired] : not feeling tired [Recent Weight Gain (___ Lbs)] : no recent weight gain [Recent Weight Loss (___ Lbs)] : no recent weight loss [Eye Pain] : no eye pain [Red Eyes] : eyes not red [Eyesight Problems] : no eyesight problems [Discharge From Eyes] : no purulent discharge from the eyes [Dry Eyes] : no dryness of the eyes [Loss Of Hearing] : no hearing loss [Earache] : no earache [Eyes Itch] : no itching of the eyes [Heart Rate Is Slow] : the heart rate was not slow [Heart Rate Is Fast] : the heart rate was not fast [Chest Pain] : no chest pain [Palpitations] : no palpitations [Leg Claudication] : no intermittent leg claudication [Lower Ext Edema] : no extremity edema [Shortness Of Breath] : no shortness of breath [Wheezing] : no wheezing [Cough] : no cough [SOB on Exertion] : no shortness of breath during exertion [Orthopnea] : no orthopnea [PND] : no PND [Abdominal Pain] : abdominal pain [Vomiting] : no vomiting [Constipation] : no constipation [Heartburn] : no heartburn [Diarrhea] : diarrhea [Melena] : no melena [Arthralgias] : arthralgias [Joint Pain] : no joint pain [Joint Swelling] : no joint swelling [Joint Stiffness] : no joint stiffness [Itching] : no itching [Negative] : Heme/Lymph [de-identified] : much improved. Psoriasis

## 2020-02-13 ENCOUNTER — OUTPATIENT (OUTPATIENT)
Dept: OUTPATIENT SERVICES | Facility: HOSPITAL | Age: 65
LOS: 1 days | Discharge: HOME | End: 2020-02-13

## 2020-02-13 DIAGNOSIS — D64.9 ANEMIA, UNSPECIFIED: ICD-10-CM

## 2020-04-02 ENCOUNTER — APPOINTMENT (OUTPATIENT)
Age: 65
End: 2020-04-02

## 2020-04-20 ENCOUNTER — RX RENEWAL (OUTPATIENT)
Age: 65
End: 2020-04-20

## 2020-04-30 ENCOUNTER — RX RENEWAL (OUTPATIENT)
Age: 65
End: 2020-04-30

## 2020-07-06 ENCOUNTER — APPOINTMENT (OUTPATIENT)
Dept: RHEUMATOLOGY | Facility: CLINIC | Age: 65
End: 2020-07-06
Payer: COMMERCIAL

## 2020-07-06 VITALS
TEMPERATURE: 98.9 F | HEART RATE: 79 BPM | SYSTOLIC BLOOD PRESSURE: 142 MMHG | OXYGEN SATURATION: 98 % | HEIGHT: 64 IN | BODY MASS INDEX: 29.02 KG/M2 | DIASTOLIC BLOOD PRESSURE: 72 MMHG | WEIGHT: 170 LBS

## 2020-07-06 DIAGNOSIS — Z79.1 LONG TERM (CURRENT) USE OF NON-STEROIDAL ANTI-INFLAMMATORIES (NSAID): ICD-10-CM

## 2020-07-06 PROCEDURE — 36415 COLL VENOUS BLD VENIPUNCTURE: CPT

## 2020-07-06 PROCEDURE — 99214 OFFICE O/P EST MOD 30 MIN: CPT | Mod: 25

## 2020-07-06 NOTE — PHYSICAL EXAM
[General Appearance - Alert] : alert [General Appearance - In No Acute Distress] : in no acute distress [PERRL With Normal Accommodation] : pupils were equal in size, round, and reactive to light [Sclera] : the sclera and conjunctiva were normal [Extraocular Movements] : extraocular movements were intact [Outer Ear] : the ears and nose were normal in appearance [Neck Appearance] : the appearance of the neck was normal [Oropharynx] : the oropharynx was normal [Neck Cervical Mass (___cm)] : no neck mass was observed [Jugular Venous Distention Increased] : there was no jugular-venous distention [Thyroid Diffuse Enlargement] : the thyroid was not enlarged [Thyroid Nodule] : there were no palpable thyroid nodules [Auscultation Breath Sounds / Voice Sounds] : lungs were clear to auscultation bilaterally [Heart Rate And Rhythm] : heart rate was normal and rhythm regular [Heart Sounds Gallop] : no gallops [Heart Sounds] : normal S1 and S2 [Murmurs] : no murmurs [Edema] : there was no peripheral edema [Full Pulse] : the pedal pulses are present [Heart Sounds Pericardial Friction Rub] : no pericardial rub [Bowel Sounds] : normal bowel sounds [Abdomen Tenderness] : non-tender [Abdomen Soft] : soft [Abdomen Mass (___ Cm)] : no abdominal mass palpated [] : no hepato-splenomegaly [Cervical Lymph Nodes Enlarged Anterior Bilaterally] : anterior cervical [Cervical Lymph Nodes Enlarged Posterior Bilaterally] : posterior cervical [No CVA Tenderness] : no ~M costovertebral angle tenderness [Supraclavicular Lymph Nodes Enlarged Bilaterally] : supraclavicular [Musculoskeletal - Swelling] : no joint swelling seen [FreeTextEntry1] : FROM neck, shoulders, elbows, wrists, hands, hips, knees, ankles and feet, including the small joints of the hands and feet without any evidence of inflammatory arthritis effusion right wrist, ankles with POM [Sensation] : the sensory exam was normal to light touch and pinprick [Motor Exam] : the motor exam was normal [No Focal Deficits] : no focal deficits [Oriented To Time, Place, And Person] : oriented to person, place, and time [Impaired Insight] : insight and judgment were intact [Affect] : the affect was normal

## 2020-07-06 NOTE — HISTORY OF PRESENT ILLNESS
[FreeTextEntry1] : f/u 6 months\par Dandy is a 65-year-old patient, with  diagnosis of psoriatic arthritis. He is a pt of Dr. Moran. He preferred to stay away from the methotrexate and preferred using a more benign drug like sulfasalazine. He Is on combination of SSZ and otezla. \par He feels that otezla has worked very well for him.   He rates his joint pain 2/10 but less than 15 minutes of morning stiffness and no fatigue.  He prefers to stay on the sulfasalazine because he tried to taper it down to 2 and a joint pain began to return.\par He has seen GI and had a recent colonoscopy which as per the pt was normal. \par On my initial evaluation I noted that his ALT was elevated, his GGT was elevated, he did see his primary care physician at that time and had a sonogram of the liver, he states he was told he has a fatty liver. He does admit to having 2 alcoholic beverages, usually whiskey every night.\par He has been seeing hematology and has had B12 injections\par He denies prolonged morning stiffness. He denies any other issues with his help. He has been doing well otherwise\par He has a good appetite and denies wt loss.

## 2020-07-06 NOTE — REVIEW OF SYSTEMS
[Fever] : no fever [Chills] : no chills [Feeling Poorly] : not feeling poorly [Recent Weight Loss (___ Lbs)] : no recent weight loss [Recent Weight Gain (___ Lbs)] : no recent weight gain [Feeling Tired] : not feeling tired [Eye Pain] : no eye pain [Red Eyes] : eyes not red [Eyesight Problems] : no eyesight problems [Discharge From Eyes] : no purulent discharge from the eyes [Dry Eyes] : no dryness of the eyes [Eyes Itch] : no itching of the eyes [Earache] : no earache [Loss Of Hearing] : no hearing loss [Heart Rate Is Slow] : the heart rate was not slow [Chest Pain] : no chest pain [Heart Rate Is Fast] : the heart rate was not fast [Palpitations] : no palpitations [Leg Claudication] : no intermittent leg claudication [Lower Ext Edema] : no extremity edema [Wheezing] : no wheezing [Shortness Of Breath] : no shortness of breath [Cough] : no cough [SOB on Exertion] : no shortness of breath during exertion [PND] : no PND [Orthopnea] : no orthopnea [Abdominal Pain] : abdominal pain [Vomiting] : no vomiting [Diarrhea] : diarrhea [Constipation] : no constipation [Heartburn] : no heartburn [Melena] : no melena [Arthralgias] : arthralgias [Joint Pain] : no joint pain [Joint Swelling] : no joint swelling [Joint Stiffness] : no joint stiffness [Itching] : no itching [Negative] : Heme/Lymph [de-identified] : much improved. Psoriasis

## 2020-07-06 NOTE — ASSESSMENT
[FreeTextEntry1] : 65 year old male with PSA presenting for routine visit today.\par \par PSA-\par he is on a combination of SSZ and otezla. Labs to be done today. He is using SSZ at 1 gm and otezla bid. Labs to be done today. He has joint activity today and his right wrist is active, he defers switching meds\par \par Gingivitis-\par he is on prophylactic doxycycline\par \par NSAID use-\par Risks and benefits of NSAIDS were d/w patient including but not limited to hypertension, nephrotoxicity, GI intolerance , increased risk for thromboembolism, CHF, hepatotoxicity and anemia\par \par ELEvated LFTs-\par as per pt he was dx with fatty liver, he does consume ETOH on a daily basis. \par \par Anemia-\par he has ch anemia, states that he has recently had GI w/u which was normal. he may have thalessemia trait based on low MCV, he has been dx with B12 def as well \par Will repeat labs today to return to hematology as their note suggest iron infusions\par \par f/u 6 months. He is aware to call if his sx worsen.

## 2020-07-08 LAB
25(OH)D3 SERPL-MCNC: 45 NG/ML
ALBUMIN SERPL ELPH-MCNC: 4.5 G/DL
ALP BLD-CCNC: 92 U/L
ALT SERPL-CCNC: 40 U/L
ANION GAP SERPL CALC-SCNC: 17 MMOL/L
AST SERPL-CCNC: 80 U/L
BASOPHILS # BLD AUTO: 0.03 K/UL
BASOPHILS NFR BLD AUTO: 0.7 %
BILIRUB SERPL-MCNC: 0.4 MG/DL
BUN SERPL-MCNC: 8 MG/DL
CALCIUM SERPL-MCNC: 9.4 MG/DL
CHLORIDE SERPL-SCNC: 107 MMOL/L
CO2 SERPL-SCNC: 21 MMOL/L
CREAT SERPL-MCNC: 0.68 MG/DL
CRP SERPL-MCNC: 0.48 MG/DL
EOSINOPHIL # BLD AUTO: 0.03 K/UL
EOSINOPHIL NFR BLD AUTO: 0.7 %
ERYTHROCYTE [SEDIMENTATION RATE] IN BLOOD BY WESTERGREN METHOD: 61 MM/HR
FERRITIN SERPL-MCNC: 26 NG/ML
FOLATE SERPL-MCNC: >20 NG/ML
GLUCOSE SERPL-MCNC: 91 MG/DL
HCT VFR BLD CALC: 37 %
HGB BLD-MCNC: 11.1 G/DL
IMM GRANULOCYTES NFR BLD AUTO: 0.2 %
IRON SATN MFR SERPL: 10 %
IRON SERPL-MCNC: 42 UG/DL
LYMPHOCYTES # BLD AUTO: 1.27 K/UL
LYMPHOCYTES NFR BLD AUTO: 30.9 %
MAN DIFF?: NORMAL
MCHC RBC-ENTMCNC: 26.7 PG
MCHC RBC-ENTMCNC: 30 GM/DL
MCV RBC AUTO: 89.2 FL
MONOCYTES # BLD AUTO: 0.29 K/UL
MONOCYTES NFR BLD AUTO: 7.1 %
NEUTROPHILS # BLD AUTO: 2.48 K/UL
NEUTROPHILS NFR BLD AUTO: 60.4 %
PLATELET # BLD AUTO: 171 K/UL
POTASSIUM SERPL-SCNC: 4.2 MMOL/L
PROT SERPL-MCNC: 7.6 G/DL
RBC # BLD: 4.15 M/UL
RBC # FLD: 17.4 %
SODIUM SERPL-SCNC: 144 MMOL/L
TIBC SERPL-MCNC: 396 UG/DL
TRANSFERRIN SERPL-MCNC: 317 MG/DL
UIBC SERPL-MCNC: 355 UG/DL
VIT B12 SERPL-MCNC: 1490 PG/ML
WBC # FLD AUTO: 4.11 K/UL

## 2020-08-21 ENCOUNTER — OUTPATIENT (OUTPATIENT)
Dept: OUTPATIENT SERVICES | Facility: HOSPITAL | Age: 65
LOS: 1 days | Discharge: HOME | End: 2020-08-21

## 2020-08-21 DIAGNOSIS — D64.9 ANEMIA, UNSPECIFIED: ICD-10-CM

## 2020-08-24 ENCOUNTER — APPOINTMENT (OUTPATIENT)
Age: 65
End: 2020-08-24
Payer: COMMERCIAL

## 2020-08-24 VITALS
DIASTOLIC BLOOD PRESSURE: 76 MMHG | HEIGHT: 64 IN | SYSTOLIC BLOOD PRESSURE: 143 MMHG | HEART RATE: 87 BPM | RESPIRATION RATE: 16 BRPM | BODY MASS INDEX: 29.88 KG/M2 | TEMPERATURE: 97.6 F | WEIGHT: 175 LBS

## 2020-08-24 VITALS — WEIGHT: 175 LBS | DIASTOLIC BLOOD PRESSURE: 76 MMHG | BODY MASS INDEX: 30.04 KG/M2 | SYSTOLIC BLOOD PRESSURE: 143 MMHG

## 2020-08-24 DIAGNOSIS — E53.8 DEFICIENCY OF OTHER SPECIFIED B GROUP VITAMINS: ICD-10-CM

## 2020-08-24 PROCEDURE — 99213 OFFICE O/P EST LOW 20 MIN: CPT

## 2020-08-24 NOTE — PHYSICAL EXAM
[Fully active, able to carry on all pre-disease performance without restriction] : Status 0 - Fully active, able to carry on all pre-disease performance without restriction [Normal] : no JVD, no calf tenderness, venous stasis changes, varices [de-identified] : left hand arthritic mild deformities

## 2020-08-24 NOTE — HISTORY OF PRESENT ILLNESS
[de-identified] : 65 M with history of psoriatic arthritis x 10 years, and B12 deficiency anemia .\par \par  [FreeTextEntry1] : B 12 injections\par \par \par  [de-identified] : Returning for short term follow up; last seen in office after 4th weekly dose of B12 injection, in November 2019. Vitamin B12 serum level improved. In this interval patient was compliant with folate supplementation as well, while he continues to receive active treatment for psoriatic arthritis ( sulfasalazine and Otezla) under the care of Dr. Mcdonald, with complete resolution of psoriatic rash. Patient is minimally symptomatic ( 1/10 intensity arthritic pain, minimal morning stiffness, no fatigue etc). Previous laboratory values consistent with normalization B 12 level, but low folate and serum ferritin; despite previous improvement, patient's inflammatory markers continue to be elevated (ESR 61).

## 2020-08-27 ENCOUNTER — RX RENEWAL (OUTPATIENT)
Age: 65
End: 2020-08-27

## 2020-09-04 ENCOUNTER — RX RENEWAL (OUTPATIENT)
Age: 65
End: 2020-09-04

## 2020-10-11 ENCOUNTER — RX RENEWAL (OUTPATIENT)
Age: 65
End: 2020-10-11

## 2020-10-16 NOTE — ASSESSMENT
[FreeTextEntry1] : Mr. MARTINI and his spouse 's questions were answered to their satisfaction. He  expressed his  understanding and willingness to comply with the above recommendations, and  will return to the office to review the results of the blood tests in 6 months.\par \par  Apixaban/Eliquis/Influenza Vaccination Influenza Vaccination

## 2021-02-17 ENCOUNTER — OUTPATIENT (OUTPATIENT)
Dept: OUTPATIENT SERVICES | Facility: HOSPITAL | Age: 66
LOS: 1 days | Discharge: HOME | End: 2021-02-17

## 2021-02-17 DIAGNOSIS — D64.9 ANEMIA, UNSPECIFIED: ICD-10-CM

## 2021-02-22 ENCOUNTER — RESULT REVIEW (OUTPATIENT)
Age: 66
End: 2021-02-22

## 2021-02-22 ENCOUNTER — APPOINTMENT (OUTPATIENT)
Age: 66
End: 2021-02-22
Payer: COMMERCIAL

## 2021-02-22 VITALS
RESPIRATION RATE: 16 BRPM | TEMPERATURE: 96.6 F | DIASTOLIC BLOOD PRESSURE: 97 MMHG | HEIGHT: 64 IN | WEIGHT: 172 LBS | SYSTOLIC BLOOD PRESSURE: 182 MMHG | HEART RATE: 72 BPM | BODY MASS INDEX: 29.37 KG/M2

## 2021-02-22 LAB
ALBUMIN SERPL ELPH-MCNC: 4.4 G/DL — SIGNIFICANT CHANGE UP (ref 3.3–5)
ALP SERPL-CCNC: 102 U/L — SIGNIFICANT CHANGE UP (ref 40–120)
ALT FLD-CCNC: 40 U/L — SIGNIFICANT CHANGE UP (ref 10–45)
ANION GAP SERPL CALC-SCNC: 15 MMOL/L — SIGNIFICANT CHANGE UP (ref 5–17)
AST SERPL-CCNC: 67 U/L — HIGH (ref 10–40)
BASOPHILS # BLD AUTO: 0.03 K/UL — SIGNIFICANT CHANGE UP (ref 0–0.2)
BASOPHILS NFR BLD AUTO: 0.6 % — SIGNIFICANT CHANGE UP (ref 0–2)
BILIRUB SERPL-MCNC: 0.3 MG/DL — SIGNIFICANT CHANGE UP (ref 0.2–1.2)
BUN SERPL-MCNC: 7 MG/DL — SIGNIFICANT CHANGE UP (ref 7–23)
CALCIUM SERPL-MCNC: 9.9 MG/DL — SIGNIFICANT CHANGE UP (ref 8.4–10.5)
CHLORIDE SERPL-SCNC: 103 MMOL/L — SIGNIFICANT CHANGE UP (ref 96–108)
CO2 SERPL-SCNC: 22 MMOL/L — SIGNIFICANT CHANGE UP (ref 22–31)
CREAT SERPL-MCNC: 0.72 MG/DL — SIGNIFICANT CHANGE UP (ref 0.5–1.3)
EOSINOPHIL # BLD AUTO: 0.04 K/UL — SIGNIFICANT CHANGE UP (ref 0–0.5)
EOSINOPHIL NFR BLD AUTO: 0.8 % — SIGNIFICANT CHANGE UP (ref 0–6)
FERRITIN SERPL-MCNC: 37 NG/ML — SIGNIFICANT CHANGE UP (ref 30–400)
FOLATE SERPL-MCNC: >20 NG/ML — SIGNIFICANT CHANGE UP
GLUCOSE SERPL-MCNC: 92 MG/DL — SIGNIFICANT CHANGE UP (ref 70–99)
HCT VFR BLD CALC: 38.2 % — LOW (ref 39–50)
HGB BLD-MCNC: 12.9 G/DL — LOW (ref 13–17)
IMM GRANULOCYTES NFR BLD AUTO: 0.2 % — SIGNIFICANT CHANGE UP (ref 0–1.5)
LYMPHOCYTES # BLD AUTO: 1.18 K/UL — SIGNIFICANT CHANGE UP (ref 1–3.3)
LYMPHOCYTES # BLD AUTO: 24.9 % — SIGNIFICANT CHANGE UP (ref 13–44)
MCHC RBC-ENTMCNC: 30.3 PG — SIGNIFICANT CHANGE UP (ref 27–34)
MCHC RBC-ENTMCNC: 33.8 GM/DL — SIGNIFICANT CHANGE UP (ref 32–36)
MCV RBC AUTO: 89.7 FL — SIGNIFICANT CHANGE UP (ref 80–100)
MONOCYTES # BLD AUTO: 0.43 K/UL — SIGNIFICANT CHANGE UP (ref 0–0.9)
MONOCYTES NFR BLD AUTO: 9.1 % — SIGNIFICANT CHANGE UP (ref 2–14)
NEUTROPHILS # BLD AUTO: 3.04 K/UL — SIGNIFICANT CHANGE UP (ref 1.8–7.4)
NEUTROPHILS NFR BLD AUTO: 64.4 % — SIGNIFICANT CHANGE UP (ref 43–77)
NRBC # BLD: 0 /100 WBCS — SIGNIFICANT CHANGE UP (ref 0–0)
PLATELET # BLD AUTO: 181 K/UL — SIGNIFICANT CHANGE UP (ref 150–400)
POTASSIUM SERPL-MCNC: 4.1 MMOL/L — SIGNIFICANT CHANGE UP (ref 3.5–5.3)
POTASSIUM SERPL-SCNC: 4.1 MMOL/L — SIGNIFICANT CHANGE UP (ref 3.5–5.3)
PROT SERPL-MCNC: 7.9 G/DL — SIGNIFICANT CHANGE UP (ref 6–8.3)
RBC # BLD: 4.26 M/UL — SIGNIFICANT CHANGE UP (ref 4.2–5.8)
RBC # FLD: 14.5 % — SIGNIFICANT CHANGE UP (ref 10.3–14.5)
SODIUM SERPL-SCNC: 139 MMOL/L — SIGNIFICANT CHANGE UP (ref 135–145)
VIT B12 SERPL-MCNC: >2000 PG/ML — HIGH (ref 232–1245)
WBC # BLD: 4.73 K/UL — SIGNIFICANT CHANGE UP (ref 3.8–10.5)
WBC # FLD AUTO: 4.73 K/UL — SIGNIFICANT CHANGE UP (ref 3.8–10.5)

## 2021-02-22 PROCEDURE — 99214 OFFICE O/P EST MOD 30 MIN: CPT

## 2021-02-22 NOTE — PHYSICAL EXAM
[Fully active, able to carry on all pre-disease performance without restriction] : Status 0 - Fully active, able to carry on all pre-disease performance without restriction [Normal] : normal appearance, no rash, nodules, vesicles, ulcers, erythema [de-identified] : left hand arthritic mild deformities

## 2021-02-22 NOTE — REVIEW OF SYSTEMS
[Negative] : Integumentary [Fatigue] : no fatigue [Recent Change In Weight] : ~T no recent weight change

## 2021-02-22 NOTE — ASSESSMENT
[FreeTextEntry1] : Mr. MARTINI and his spouse 's questions were answered to their satisfaction. He  expressed his  understanding and willingness to comply with the above recommendations, and  will return to the office to review the results of the blood tests in 6 months.\par \par

## 2021-02-22 NOTE — HISTORY OF PRESENT ILLNESS
[de-identified] : 65 M with history of psoriatic arthritis x 10 years, and B12 deficiency anemia .\par \par  [FreeTextEntry1] : B 12 injections\par \par \par  [de-identified] : Last seen in office In August 2020.After 4th weekly dose of B12 injection there was a noticeable improvement in B12 serum level, as well as in hemoglobin concentration. In this interval patient was compliant with folate supplementation as well, while he continues to receive active treatment for psoriatic arthritis ( sulfasalazine and Otezla) under the care of Dr. Mcdonald.  Patient is also taking doxycycline, prescribed by his dentist in an attempt to decrease tartar formation.  Patient is minimally symptomatic ( 1/10 intensity arthritic pain, minimal morning stiffness, no fatigue etc). Continues to work full-time. Previous laboratory values consistent with normalization B 12 level, but low folate and serum ferritin; labs repeated today.

## 2021-02-24 ENCOUNTER — RX RENEWAL (OUTPATIENT)
Age: 66
End: 2021-02-24

## 2021-02-25 DIAGNOSIS — E53.8 DEFICIENCY OF OTHER SPECIFIED B GROUP VITAMINS: ICD-10-CM

## 2021-03-23 ENCOUNTER — RX RENEWAL (OUTPATIENT)
Age: 66
End: 2021-03-23

## 2021-04-02 ENCOUNTER — RX RENEWAL (OUTPATIENT)
Age: 66
End: 2021-04-02

## 2021-05-18 ENCOUNTER — NON-APPOINTMENT (OUTPATIENT)
Age: 66
End: 2021-05-18

## 2021-06-07 ENCOUNTER — LABORATORY RESULT (OUTPATIENT)
Age: 66
End: 2021-06-07

## 2021-06-07 ENCOUNTER — NON-APPOINTMENT (OUTPATIENT)
Age: 66
End: 2021-06-07

## 2021-06-07 ENCOUNTER — APPOINTMENT (OUTPATIENT)
Dept: RHEUMATOLOGY | Facility: CLINIC | Age: 66
End: 2021-06-07
Payer: COMMERCIAL

## 2021-06-07 VITALS
WEIGHT: 175 LBS | HEART RATE: 83 BPM | SYSTOLIC BLOOD PRESSURE: 167 MMHG | HEIGHT: 64 IN | TEMPERATURE: 97.8 F | DIASTOLIC BLOOD PRESSURE: 102 MMHG | BODY MASS INDEX: 29.88 KG/M2 | OXYGEN SATURATION: 94 %

## 2021-06-07 DIAGNOSIS — Z11.59 ENCOUNTER FOR SCREENING FOR OTHER VIRAL DISEASES: ICD-10-CM

## 2021-06-07 PROCEDURE — 99215 OFFICE O/P EST HI 40 MIN: CPT

## 2021-06-07 PROCEDURE — 99072 ADDL SUPL MATRL&STAF TM PHE: CPT

## 2021-06-09 LAB
25(OH)D3 SERPL-MCNC: 52.5 NG/ML
ALBUMIN SERPL ELPH-MCNC: 4.4 G/DL
ALP BLD-CCNC: 104 U/L
ALT SERPL-CCNC: 32 U/L
ANION GAP SERPL CALC-SCNC: 16 MMOL/L
AST SERPL-CCNC: 71 U/L
BASOPHILS # BLD AUTO: 0.03 K/UL
BASOPHILS NFR BLD AUTO: 0.7 %
BILIRUB SERPL-MCNC: 0.4 MG/DL
BUN SERPL-MCNC: 10 MG/DL
CALCIUM SERPL-MCNC: 9.5 MG/DL
CELIACPAN: NORMAL
CHLORIDE SERPL-SCNC: 103 MMOL/L
CO2 SERPL-SCNC: 21 MMOL/L
COVID-19 NUCLEOCAPSID  GAM ANTIBODY INTERPRETATION: NEGATIVE
COVID-19 SPIKE DOMAIN ANTIBODY INTERPRETATION: POSITIVE
CREAT SERPL-MCNC: 0.67 MG/DL
CRP SERPL-MCNC: 7 MG/L
EOSINOPHIL # BLD AUTO: 0.04 K/UL
EOSINOPHIL NFR BLD AUTO: 0.9 %
ERYTHROCYTE [SEDIMENTATION RATE] IN BLOOD BY WESTERGREN METHOD: 52 MM/HR
GGT SERPL-CCNC: 121 U/L
HCT VFR BLD CALC: 40.6 %
HGB BLD-MCNC: 12.7 G/DL
IMM GRANULOCYTES NFR BLD AUTO: 0 %
LYMPHOCYTES # BLD AUTO: 1.22 K/UL
LYMPHOCYTES NFR BLD AUTO: 28.6 %
MAN DIFF?: NORMAL
MCHC RBC-ENTMCNC: 30.1 PG
MCHC RBC-ENTMCNC: 31.3 GM/DL
MCV RBC AUTO: 96.2 FL
MONOCYTES # BLD AUTO: 0.3 K/UL
MONOCYTES NFR BLD AUTO: 7 %
NEUTROPHILS # BLD AUTO: 2.68 K/UL
NEUTROPHILS NFR BLD AUTO: 62.8 %
PLATELET # BLD AUTO: 158 K/UL
POTASSIUM SERPL-SCNC: 4 MMOL/L
PROT SERPL-MCNC: 7.8 G/DL
RBC # BLD: 4.22 M/UL
RBC # FLD: 15.4 %
SARS-COV-2 AB SERPL IA-ACNC: >250 U/ML
SARS-COV-2 AB SERPL QL IA: 0.08 INDEX
SODIUM SERPL-SCNC: 141 MMOL/L
WBC # FLD AUTO: 4.27 K/UL

## 2021-06-09 NOTE — PHYSICAL EXAM
[General Appearance - In No Acute Distress] : in no acute distress [General Appearance - Alert] : alert [Sclera] : the sclera and conjunctiva were normal [PERRL With Normal Accommodation] : pupils were equal in size, round, and reactive to light [Extraocular Movements] : extraocular movements were intact [Outer Ear] : the ears and nose were normal in appearance [Oropharynx] : the oropharynx was normal [Neck Appearance] : the appearance of the neck was normal [Jugular Venous Distention Increased] : there was no jugular-venous distention [Neck Cervical Mass (___cm)] : no neck mass was observed [Thyroid Diffuse Enlargement] : the thyroid was not enlarged [Thyroid Nodule] : there were no palpable thyroid nodules [Auscultation Breath Sounds / Voice Sounds] : lungs were clear to auscultation bilaterally [Heart Rate And Rhythm] : heart rate was normal and rhythm regular [Heart Sounds] : normal S1 and S2 [Heart Sounds Gallop] : no gallops [Murmurs] : no murmurs [Heart Sounds Pericardial Friction Rub] : no pericardial rub [Full Pulse] : the pedal pulses are present [Edema] : there was no peripheral edema [Bowel Sounds] : normal bowel sounds [Abdomen Tenderness] : non-tender [Abdomen Soft] : soft [] : no hepato-splenomegaly [Abdomen Mass (___ Cm)] : no abdominal mass palpated [Cervical Lymph Nodes Enlarged Posterior Bilaterally] : posterior cervical [Cervical Lymph Nodes Enlarged Anterior Bilaterally] : anterior cervical [Supraclavicular Lymph Nodes Enlarged Bilaterally] : supraclavicular [No CVA Tenderness] : no ~M costovertebral angle tenderness [Musculoskeletal - Swelling] : no joint swelling seen [Sensation] : the sensory exam was normal to light touch and pinprick [Motor Exam] : the motor exam was normal [No Focal Deficits] : no focal deficits [Oriented To Time, Place, And Person] : oriented to person, place, and time [Impaired Insight] : insight and judgment were intact [Affect] : the affect was normal [FreeTextEntry1] : + psoriasis elbows. No nail pitting

## 2021-06-09 NOTE — REVIEW OF SYSTEMS
[Abdominal Pain] : abdominal pain [Diarrhea] : diarrhea [Arthralgias] : arthralgias [Negative] : Heme/Lymph [Fever] : no fever [Chills] : no chills [Feeling Poorly] : not feeling poorly [Feeling Tired] : not feeling tired [Recent Weight Gain (___ Lbs)] : no recent weight gain [Recent Weight Loss (___ Lbs)] : no recent weight loss [Eye Pain] : no eye pain [Red Eyes] : eyes not red [Eyesight Problems] : no eyesight problems [Discharge From Eyes] : no purulent discharge from the eyes [Dry Eyes] : no dryness of the eyes [Earache] : no earache [Eyes Itch] : no itching of the eyes [Loss Of Hearing] : no hearing loss [Heart Rate Is Slow] : the heart rate was not slow [Heart Rate Is Fast] : the heart rate was not fast [Chest Pain] : no chest pain [Palpitations] : no palpitations [Leg Claudication] : no intermittent leg claudication [Lower Ext Edema] : no extremity edema [Shortness Of Breath] : no shortness of breath [Wheezing] : no wheezing [Cough] : no cough [SOB on Exertion] : no shortness of breath during exertion [Orthopnea] : no orthopnea [PND] : no PND [Vomiting] : no vomiting [Constipation] : no constipation [Heartburn] : no heartburn [Joint Pain] : no joint pain [Melena] : no melena [Joint Swelling] : no joint swelling [Joint Stiffness] : no joint stiffness [Itching] : no itching [de-identified] : much improved. Psoriasis

## 2021-06-09 NOTE — HISTORY OF PRESENT ILLNESS
[FreeTextEntry1] : \par INTERVAL HX\par - tolerates the meds without weight loss or depression.  does have loose stools but the med works so well for him he feels it is tolerable\par - denies swelling, stiffness or pain currently \par - denies dactylitis\par - skin is manageble

## 2021-06-09 NOTE — ASSESSMENT
[FreeTextEntry1] : Dandy is a 65-year-old PMHX HTN, Ps/PsA\par \par #PSA- dx 2016 now controlled on combination of SSZ and otezla. \par - xrays wrist 2019 - \par - labs\par \par #medicine monitoring. long term use of drug\par - check labs\par - Quant tb negative 2019 - will do next visit \par \par #inc LFT.   - likely 2/2 dx with fatty liver, he does consume ETOH on a daily basis. \par - monitor closely given meds\par \par #Anemia- chronic, possibly mixed picture with normal mcv\par - now being treated for b12 def (replete) \par - given diarrhea and autoimmunity amara also r/o sprue and pernicious anemia\par - will f/u with heme

## 2021-06-09 NOTE — PHYSICAL EXAM
[General Appearance - In No Acute Distress] : in no acute distress [General Appearance - Alert] : alert [Sclera] : the sclera and conjunctiva were normal [PERRL With Normal Accommodation] : pupils were equal in size, round, and reactive to light [Extraocular Movements] : extraocular movements were intact [Outer Ear] : the ears and nose were normal in appearance [Oropharynx] : the oropharynx was normal [Neck Appearance] : the appearance of the neck was normal [Neck Cervical Mass (___cm)] : no neck mass was observed [Jugular Venous Distention Increased] : there was no jugular-venous distention [Thyroid Diffuse Enlargement] : the thyroid was not enlarged [Thyroid Nodule] : there were no palpable thyroid nodules [Auscultation Breath Sounds / Voice Sounds] : lungs were clear to auscultation bilaterally [Heart Rate And Rhythm] : heart rate was normal and rhythm regular [Heart Sounds] : normal S1 and S2 [Heart Sounds Gallop] : no gallops [Murmurs] : no murmurs [Heart Sounds Pericardial Friction Rub] : no pericardial rub [Full Pulse] : the pedal pulses are present [Edema] : there was no peripheral edema [Bowel Sounds] : normal bowel sounds [Abdomen Tenderness] : non-tender [Abdomen Soft] : soft [] : no hepato-splenomegaly [Cervical Lymph Nodes Enlarged Posterior Bilaterally] : posterior cervical [Abdomen Mass (___ Cm)] : no abdominal mass palpated [Cervical Lymph Nodes Enlarged Anterior Bilaterally] : anterior cervical [Supraclavicular Lymph Nodes Enlarged Bilaterally] : supraclavicular [No CVA Tenderness] : no ~M costovertebral angle tenderness [Musculoskeletal - Swelling] : no joint swelling seen [Sensation] : the sensory exam was normal to light touch and pinprick [Motor Exam] : the motor exam was normal [No Focal Deficits] : no focal deficits [Oriented To Time, Place, And Person] : oriented to person, place, and time [Impaired Insight] : insight and judgment were intact [Affect] : the affect was normal [FreeTextEntry1] : + psoriasis elbows. No nail pitting

## 2021-06-09 NOTE — REVIEW OF SYSTEMS
[Abdominal Pain] : abdominal pain [Diarrhea] : diarrhea [Arthralgias] : arthralgias [Negative] : Heme/Lymph [Fever] : no fever [Chills] : no chills [Feeling Poorly] : not feeling poorly [Feeling Tired] : not feeling tired [Recent Weight Gain (___ Lbs)] : no recent weight gain [Recent Weight Loss (___ Lbs)] : no recent weight loss [Eye Pain] : no eye pain [Red Eyes] : eyes not red [Eyesight Problems] : no eyesight problems [Discharge From Eyes] : no purulent discharge from the eyes [Dry Eyes] : no dryness of the eyes [Eyes Itch] : no itching of the eyes [Earache] : no earache [Loss Of Hearing] : no hearing loss [Heart Rate Is Slow] : the heart rate was not slow [Heart Rate Is Fast] : the heart rate was not fast [Chest Pain] : no chest pain [Palpitations] : no palpitations [Leg Claudication] : no intermittent leg claudication [Lower Ext Edema] : no extremity edema [Shortness Of Breath] : no shortness of breath [Wheezing] : no wheezing [Cough] : no cough [SOB on Exertion] : no shortness of breath during exertion [Orthopnea] : no orthopnea [PND] : no PND [Vomiting] : no vomiting [Constipation] : no constipation [Heartburn] : no heartburn [Melena] : no melena [Joint Pain] : no joint pain [Joint Swelling] : no joint swelling [Joint Stiffness] : no joint stiffness [Itching] : no itching [de-identified] : much improved. Psoriasis

## 2021-06-09 NOTE — DATA REVIEWED
[FreeTextEntry1] : wrist xray june 2019 \par - right wrist great than left with swelling. no erosions or joint space.  chronic proliferative bony changes along the ulnar styloid and right radial styloid processes

## 2021-06-11 LAB — IF BLOCK AB SER QL: 1.1 AU/ML

## 2021-08-02 ENCOUNTER — OUTPATIENT (OUTPATIENT)
Dept: OUTPATIENT SERVICES | Facility: HOSPITAL | Age: 66
LOS: 1 days | Discharge: HOME | End: 2021-08-02

## 2021-08-02 DIAGNOSIS — D64.9 ANEMIA, UNSPECIFIED: ICD-10-CM

## 2021-08-03 ENCOUNTER — RESULT REVIEW (OUTPATIENT)
Age: 66
End: 2021-08-03

## 2021-08-03 ENCOUNTER — APPOINTMENT (OUTPATIENT)
Dept: HEMATOLOGY ONCOLOGY | Facility: CLINIC | Age: 66
End: 2021-08-03
Payer: COMMERCIAL

## 2021-08-03 VITALS
TEMPERATURE: 98 F | DIASTOLIC BLOOD PRESSURE: 96 MMHG | WEIGHT: 172 LBS | SYSTOLIC BLOOD PRESSURE: 165 MMHG | BODY MASS INDEX: 29.52 KG/M2

## 2021-08-03 LAB
BASOPHILS # BLD AUTO: 0.03 K/UL — SIGNIFICANT CHANGE UP (ref 0–0.2)
BASOPHILS # BLD AUTO: 0.04 K/UL — SIGNIFICANT CHANGE UP (ref 0–0.2)
BASOPHILS NFR BLD AUTO: 0.6 % — SIGNIFICANT CHANGE UP (ref 0–2)
BASOPHILS NFR BLD AUTO: 0.8 % — SIGNIFICANT CHANGE UP (ref 0–2)
EOSINOPHIL # BLD AUTO: 0.03 K/UL — SIGNIFICANT CHANGE UP (ref 0–0.5)
EOSINOPHIL # BLD AUTO: 0.05 K/UL — SIGNIFICANT CHANGE UP (ref 0–0.5)
EOSINOPHIL NFR BLD AUTO: 0.6 % — SIGNIFICANT CHANGE UP (ref 0–6)
EOSINOPHIL NFR BLD AUTO: 1 % — SIGNIFICANT CHANGE UP (ref 0–6)
HCT VFR BLD CALC: 38.3 % — LOW (ref 39–50)
HGB BLD-MCNC: 12.9 G/DL — LOW (ref 13–17)
IMM GRANULOCYTES NFR BLD AUTO: 0.2 % — SIGNIFICANT CHANGE UP (ref 0–1.5)
IMM GRANULOCYTES NFR BLD AUTO: 0.2 % — SIGNIFICANT CHANGE UP (ref 0–1.5)
LYMPHOCYTES # BLD AUTO: 1.19 K/UL — SIGNIFICANT CHANGE UP (ref 1–3.3)
LYMPHOCYTES # BLD AUTO: 1.21 K/UL — SIGNIFICANT CHANGE UP (ref 1–3.3)
LYMPHOCYTES # BLD AUTO: 23.2 % — SIGNIFICANT CHANGE UP (ref 13–44)
LYMPHOCYTES # BLD AUTO: 23.5 % — SIGNIFICANT CHANGE UP (ref 13–44)
MCHC RBC-ENTMCNC: 32.2 PG — SIGNIFICANT CHANGE UP (ref 27–34)
MCHC RBC-ENTMCNC: 33.7 GM/DL — SIGNIFICANT CHANGE UP (ref 32–36)
MCV RBC AUTO: 95.5 FL — SIGNIFICANT CHANGE UP (ref 80–100)
MONOCYTES # BLD AUTO: 0.43 K/UL — SIGNIFICANT CHANGE UP (ref 0–0.9)
MONOCYTES # BLD AUTO: 0.43 K/UL — SIGNIFICANT CHANGE UP (ref 0–0.9)
MONOCYTES NFR BLD AUTO: 8.3 % — SIGNIFICANT CHANGE UP (ref 2–14)
MONOCYTES NFR BLD AUTO: 8.4 % — SIGNIFICANT CHANGE UP (ref 2–14)
NEUTROPHILS # BLD AUTO: 3.41 K/UL — SIGNIFICANT CHANGE UP (ref 1.8–7.4)
NEUTROPHILS # BLD AUTO: 3.44 K/UL — SIGNIFICANT CHANGE UP (ref 1.8–7.4)
NEUTROPHILS NFR BLD AUTO: 66.4 % — SIGNIFICANT CHANGE UP (ref 43–77)
NEUTROPHILS NFR BLD AUTO: 66.8 % — SIGNIFICANT CHANGE UP (ref 43–77)
NRBC # BLD: 0 /100 WBCS — SIGNIFICANT CHANGE UP (ref 0–0)
PLATELET # BLD AUTO: 161 K/UL — SIGNIFICANT CHANGE UP (ref 150–400)
RBC # BLD: 4.01 M/UL — LOW (ref 4.2–5.8)
RBC # FLD: 13.6 % — SIGNIFICANT CHANGE UP (ref 10.3–14.5)
WBC # BLD: 5.15 K/UL — SIGNIFICANT CHANGE UP (ref 3.8–10.5)
WBC # FLD AUTO: 5.15 K/UL — SIGNIFICANT CHANGE UP (ref 3.8–10.5)

## 2021-08-03 PROCEDURE — 99213 OFFICE O/P EST LOW 20 MIN: CPT

## 2021-08-03 NOTE — ASSESSMENT
[FreeTextEntry1] : Prior history of vitamin B12 deficiency, now with a normalized CBC on oral B12 and folate supplements.\par We will no longer follow on routine basis.\par Available as needed.

## 2021-08-03 NOTE — HISTORY OF PRESENT ILLNESS
[de-identified] : This 66-year-old male was followed Dr. Benítez for history of B12 deficiency.  On oral supplements of B12 and folic acid his CBC had normalized as of February 2021. \par He continues to feel well.\par Today's hemoglobin 12.9, hematocrit 38.3, MCV 95.

## 2021-08-12 DIAGNOSIS — E53.8 DEFICIENCY OF OTHER SPECIFIED B GROUP VITAMINS: ICD-10-CM

## 2021-09-08 ENCOUNTER — APPOINTMENT (OUTPATIENT)
Dept: FAMILY MEDICINE | Facility: CLINIC | Age: 66
End: 2021-09-08
Payer: COMMERCIAL

## 2021-09-08 ENCOUNTER — NON-APPOINTMENT (OUTPATIENT)
Age: 66
End: 2021-09-08

## 2021-09-08 VITALS
WEIGHT: 174 LBS | HEART RATE: 71 BPM | DIASTOLIC BLOOD PRESSURE: 84 MMHG | HEIGHT: 64 IN | OXYGEN SATURATION: 96 % | TEMPERATURE: 98.5 F | BODY MASS INDEX: 29.71 KG/M2 | SYSTOLIC BLOOD PRESSURE: 138 MMHG

## 2021-09-08 PROCEDURE — 99387 INIT PM E/M NEW PAT 65+ YRS: CPT | Mod: 25

## 2021-09-08 PROCEDURE — 36415 COLL VENOUS BLD VENIPUNCTURE: CPT

## 2021-09-08 PROCEDURE — 93000 ELECTROCARDIOGRAM COMPLETE: CPT

## 2021-09-08 NOTE — PHYSICAL EXAM
[No Acute Distress] : no acute distress [Well Nourished] : well nourished [Well Developed] : well developed [Well-Appearing] : well-appearing [Normal Sclera/Conjunctiva] : normal sclera/conjunctiva [PERRL] : pupils equal round and reactive to light [Normal Outer Ear/Nose] : the outer ears and nose were normal in appearance [EOMI] : extraocular movements intact [Normal Oropharynx] : the oropharynx was normal [No JVD] : no jugular venous distention [No Lymphadenopathy] : no lymphadenopathy [Supple] : supple [Thyroid Normal, No Nodules] : the thyroid was normal and there were no nodules present [No Respiratory Distress] : no respiratory distress  [No Accessory Muscle Use] : no accessory muscle use [Clear to Auscultation] : lungs were clear to auscultation bilaterally [Normal Rate] : normal rate  [Regular Rhythm] : with a regular rhythm [No Carotid Bruits] : no carotid bruits [No Abdominal Bruit] : a ~M bruit was not heard ~T in the abdomen [No Varicosities] : no varicosities [Pedal Pulses Present] : the pedal pulses are present [No Edema] : there was no peripheral edema [No Palpable Aorta] : no palpable aorta [No Extremity Clubbing/Cyanosis] : no extremity clubbing/cyanosis [Soft] : abdomen soft [Non Tender] : non-tender [Non-distended] : non-distended [No Masses] : no abdominal mass palpated [No HSM] : no HSM [Normal Bowel Sounds] : normal bowel sounds [Normal Posterior Cervical Nodes] : no posterior cervical lymphadenopathy [Normal Anterior Cervical Nodes] : no anterior cervical lymphadenopathy [No CVA Tenderness] : no CVA  tenderness [No Spinal Tenderness] : no spinal tenderness [Grossly Normal Strength/Tone] : grossly normal strength/tone [No Rash] : no rash [Coordination Grossly Intact] : coordination grossly intact [No Focal Deficits] : no focal deficits [Normal Gait] : normal gait [Deep Tendon Reflexes (DTR)] : deep tendon reflexes were 2+ and symmetric [Normal Affect] : the affect was normal [Normal Insight/Judgement] : insight and judgment were intact [de-identified] : 2/6 systolic murmur loudest at RUSB [de-identified] : chronic appearing swelling of wrists and ankles

## 2021-09-08 NOTE — HISTORY OF PRESENT ILLNESS
[FreeTextEntry1] : MARQUITA MARTINI is a 66 year old male here for a physical exam.\par  [de-identified] : His last PE was unknown\par His last tetanus shot was unknown\par He has not had Pneumovax \par He has not had Shingrix \par He has had the COVID vaccine\par His last dentist visit was less than 6 months ago (Dr. Villareal)\par His last eye doctor appointment was less than one year ago \par His last dermatologist visit was a few years ago (Dr. Raza)\par His diet is healthy overall\par Exercise: walking\par His last colonoscopy was 5/2019 (Dr. Kauffman)

## 2021-09-08 NOTE — PLAN
[FreeTextEntry1] : Reviewed age-appropriate preventive screening tests with patient. He is due for a number of vaccines. He deferred on all vaccines today since he would like to get his COVID booster first.\par \par Discussed clean eating (e.g. Mediterranean style diet) and recommendations for regular exercise/staying as physically active as possible.\par \par Reviewed importance of good self care (e.g. meditation, yoga, adequate rest, regular exercise, magnesium, clean eating, etc.).\par

## 2021-09-08 NOTE — ASSESSMENT
[FreeTextEntry1] : MARQUITA MARTINI is a 66 year old male here for a physical exam. He has not had a physical in many years but has been seeing specialists for rheumatoid arthritis and anemia due to B12 deficiency. He has had elevated blood pressure at previous visits but his BP is high-normal. He has had chronically elevated LFTs (specifically AST). He admits to drinking 2-3 drinks per day. He is also taking Otezla, Sulfasalazine, Meloxicam, and Doxycycline daily so this combination may be causing elevated LFTs. Will recheck his labs today.\par \par He has a heart murmur on exam and denies any previous knowledge of a heart murmur. He has no cardiac symptoms and his EKG is normal except for a right bundle branch block. Consider cardiology evaluation.

## 2021-09-10 LAB
ALBUMIN SERPL ELPH-MCNC: 4.6 G/DL
ALP BLD-CCNC: 105 U/L
ALT SERPL-CCNC: 45 U/L
ANION GAP SERPL CALC-SCNC: 17 MMOL/L
AST SERPL-CCNC: 83 U/L
BILIRUB SERPL-MCNC: 0.2 MG/DL
BUN SERPL-MCNC: 7 MG/DL
CALCIUM SERPL-MCNC: 9.6 MG/DL
CHLORIDE SERPL-SCNC: 106 MMOL/L
CHOLEST SERPL-MCNC: 198 MG/DL
CO2 SERPL-SCNC: 22 MMOL/L
CREAT SERPL-MCNC: 0.67 MG/DL
GLUCOSE SERPL-MCNC: 88 MG/DL
HDLC SERPL-MCNC: 75 MG/DL
LDLC SERPL CALC-MCNC: 109 MG/DL
NONHDLC SERPL-MCNC: 123 MG/DL
POTASSIUM SERPL-SCNC: 4.3 MMOL/L
PROT SERPL-MCNC: 7.6 G/DL
PSA SERPL-MCNC: 1.22 NG/ML
SODIUM SERPL-SCNC: 145 MMOL/L
TRIGL SERPL-MCNC: 71 MG/DL

## 2021-09-16 ENCOUNTER — NON-APPOINTMENT (OUTPATIENT)
Age: 66
End: 2021-09-16

## 2021-09-20 ENCOUNTER — APPOINTMENT (OUTPATIENT)
Dept: RHEUMATOLOGY | Facility: CLINIC | Age: 66
End: 2021-09-20
Payer: COMMERCIAL

## 2021-09-20 VITALS
TEMPERATURE: 97.5 F | WEIGHT: 172 LBS | HEIGHT: 64 IN | OXYGEN SATURATION: 97 % | DIASTOLIC BLOOD PRESSURE: 90 MMHG | HEART RATE: 86 BPM | BODY MASS INDEX: 29.37 KG/M2 | SYSTOLIC BLOOD PRESSURE: 170 MMHG

## 2021-09-20 PROCEDURE — 99214 OFFICE O/P EST MOD 30 MIN: CPT | Mod: 25

## 2021-09-20 PROCEDURE — 36415 COLL VENOUS BLD VENIPUNCTURE: CPT

## 2021-09-22 LAB
25(OH)D3 SERPL-MCNC: 50.5 NG/ML
ALBUMIN SERPL ELPH-MCNC: 4.5 G/DL
ALP BLD-CCNC: 97 U/L
ALT SERPL-CCNC: 45 U/L
ANION GAP SERPL CALC-SCNC: 19 MMOL/L
AST SERPL-CCNC: 87 U/L
BASOPHILS # BLD AUTO: 0.03 K/UL
BASOPHILS NFR BLD AUTO: 0.7 %
BILIRUB SERPL-MCNC: 0.4 MG/DL
BUN SERPL-MCNC: 8 MG/DL
CALCIUM SERPL-MCNC: 9.5 MG/DL
CHLORIDE SERPL-SCNC: 103 MMOL/L
CO2 SERPL-SCNC: 19 MMOL/L
CREAT SERPL-MCNC: 0.61 MG/DL
CRP SERPL-MCNC: 12 MG/L
EOSINOPHIL # BLD AUTO: 0.06 K/UL
EOSINOPHIL NFR BLD AUTO: 1.3 %
ERYTHROCYTE [SEDIMENTATION RATE] IN BLOOD BY WESTERGREN METHOD: 57 MM/HR
GGT SERPL-CCNC: 136 U/L
HAV IGM SER QL: NONREACTIVE
HBV CORE IGM SER QL: NONREACTIVE
HBV SURFACE AG SER QL: NONREACTIVE
HCT VFR BLD CALC: 38.4 %
HCV AB SER QL: NONREACTIVE
HCV S/CO RATIO: 0.26 S/CO
HGB BLD-MCNC: 12.9 G/DL
IMM GRANULOCYTES NFR BLD AUTO: 0.2 %
LYMPHOCYTES # BLD AUTO: 1.24 K/UL
LYMPHOCYTES NFR BLD AUTO: 27.7 %
MAN DIFF?: NORMAL
MCHC RBC-ENTMCNC: 32.6 PG
MCHC RBC-ENTMCNC: 33.6 GM/DL
MCV RBC AUTO: 97 FL
MONOCYTES # BLD AUTO: 0.29 K/UL
MONOCYTES NFR BLD AUTO: 6.5 %
NEUTROPHILS # BLD AUTO: 2.84 K/UL
NEUTROPHILS NFR BLD AUTO: 63.6 %
PLATELET # BLD AUTO: 162 K/UL
POTASSIUM SERPL-SCNC: 4.2 MMOL/L
PROT SERPL-MCNC: 7.4 G/DL
RBC # BLD: 3.96 M/UL
RBC # FLD: 13.2 %
SODIUM SERPL-SCNC: 141 MMOL/L
WBC # FLD AUTO: 4.47 K/UL

## 2021-09-22 NOTE — HISTORY OF PRESENT ILLNESS
[FreeTextEntry1] : INTERVAL HX\par - tolerates the meds without weight loss or depression.  does have loose stools but the med works so well for him he feels it is tolerable\par - denies swelling, stiffness or pain currently \par - denies dactylitis\par - skin is manageable

## 2021-09-22 NOTE — ASSESSMENT
[FreeTextEntry1] : Dandy is a 65-year-old PMHX HTN, Ps/PsA\par \par #PSA\par dx 2016 now controlled on combination of SSZ and otezla. \par -- continue otezla \par -- continue SSZ\par - labs\par \par #medicine monitoring. long term use of drug\par -- check labs\par - Quant tb negative 2019 will check \par \par #inc LFT. \par likely 2/2 dx with fatty liver, he does consume ETOH on a daily basis. \par -- monitor closely given meds\par \par #Anemia- chronic, possibly mixed picture with normal mcv\par - now being treated for b12 def (replete) \par - given diarrhea and autoimmunity amara also r/o sprue and pernicious anemia\par - will f/u with heme\par \par \par More than 50% of the encounter was spent counseling the patient on differential, workup, disease course and treatment/management.  Education was provided to the patient during this encounter.  All questions and concerns were addressed and answered.   The patient verbalized understanding and agreed to the plan. \par \par Patient has been instructed to call for an appointment if new symptoms develop.\par Patient has been instructed to make a followup appointment in 3 months.\par

## 2021-09-22 NOTE — REVIEW OF SYSTEMS
[Abdominal Pain] : abdominal pain [Diarrhea] : diarrhea [Arthralgias] : arthralgias [Negative] : Heme/Lymph [Fever] : no fever [Chills] : no chills [Feeling Poorly] : not feeling poorly [Feeling Tired] : not feeling tired [Recent Weight Gain (___ Lbs)] : no recent weight gain [Recent Weight Loss (___ Lbs)] : no recent weight loss [Eye Pain] : no eye pain [Red Eyes] : eyes not red [Eyesight Problems] : no eyesight problems [Discharge From Eyes] : no purulent discharge from the eyes [Dry Eyes] : no dryness of the eyes [Eyes Itch] : no itching of the eyes [Earache] : no earache [Loss Of Hearing] : no hearing loss [Heart Rate Is Slow] : the heart rate was not slow [Heart Rate Is Fast] : the heart rate was not fast [Chest Pain] : no chest pain [Palpitations] : no palpitations [Leg Claudication] : no intermittent leg claudication [Lower Ext Edema] : no extremity edema [Shortness Of Breath] : no shortness of breath [Wheezing] : no wheezing [Cough] : no cough [SOB on Exertion] : no shortness of breath during exertion [Orthopnea] : no orthopnea [PND] : no PND [Vomiting] : no vomiting [Constipation] : no constipation [Heartburn] : no heartburn [Melena] : no melena [Joint Pain] : no joint pain [Joint Swelling] : no joint swelling [Joint Stiffness] : no joint stiffness [Itching] : no itching [de-identified] : much improved. Psoriasis

## 2021-09-22 NOTE — PHYSICAL EXAM
[General Appearance - Alert] : alert [Sclera] : the sclera and conjunctiva were normal [General Appearance - In No Acute Distress] : in no acute distress [PERRL With Normal Accommodation] : pupils were equal in size, round, and reactive to light [Extraocular Movements] : extraocular movements were intact [Outer Ear] : the ears and nose were normal in appearance [Oropharynx] : the oropharynx was normal [Neck Appearance] : the appearance of the neck was normal [Neck Cervical Mass (___cm)] : no neck mass was observed [Jugular Venous Distention Increased] : there was no jugular-venous distention [Thyroid Diffuse Enlargement] : the thyroid was not enlarged [Thyroid Nodule] : there were no palpable thyroid nodules [] : no respiratory distress [Auscultation Breath Sounds / Voice Sounds] : lungs were clear to auscultation bilaterally [Heart Rate And Rhythm] : heart rate was normal and rhythm regular [Heart Sounds] : normal S1 and S2 [Heart Sounds Gallop] : no gallops [Heart Sounds Pericardial Friction Rub] : no pericardial rub [Murmurs] : no murmurs [Edema] : there was no peripheral edema [No CVA Tenderness] : no ~M costovertebral angle tenderness [Musculoskeletal - Swelling] : no joint swelling seen [Sensation] : the sensory exam was normal to light touch and pinprick [Motor Exam] : the motor exam was normal [No Focal Deficits] : no focal deficits [Oriented To Time, Place, And Person] : oriented to person, place, and time [Impaired Insight] : insight and judgment were intact [Affect] : the affect was normal [FreeTextEntry1] : + psoriasis elbows. No nail pitting

## 2021-09-23 LAB
M TB IFN-G BLD-IMP: NEGATIVE
QUANTIFERON TB PLUS MITOGEN MINUS NIL: 6.53 IU/ML
QUANTIFERON TB PLUS NIL: 0.02 IU/ML
QUANTIFERON TB PLUS TB1 MINUS NIL: 0 IU/ML
QUANTIFERON TB PLUS TB2 MINUS NIL: 0 IU/ML

## 2021-12-21 RX ORDER — ERGOCALCIFEROL 1.25 MG/1
1.25 MG CAPSULE, LIQUID FILLED ORAL
Qty: 2 | Refills: 3 | Status: ACTIVE | COMMUNITY
Start: 2019-06-06 | End: 1900-01-01

## 2021-12-23 ENCOUNTER — NON-APPOINTMENT (OUTPATIENT)
Age: 66
End: 2021-12-23

## 2022-01-14 ENCOUNTER — APPOINTMENT (OUTPATIENT)
Dept: FAMILY MEDICINE | Facility: CLINIC | Age: 67
End: 2022-01-14
Payer: COMMERCIAL

## 2022-01-14 PROCEDURE — G0009: CPT

## 2022-01-14 PROCEDURE — 90670 PCV13 VACCINE IM: CPT

## 2022-01-24 ENCOUNTER — APPOINTMENT (OUTPATIENT)
Dept: RHEUMATOLOGY | Facility: CLINIC | Age: 67
End: 2022-01-24
Payer: COMMERCIAL

## 2022-01-24 VITALS
WEIGHT: 170 LBS | BODY MASS INDEX: 29.18 KG/M2 | HEART RATE: 86 BPM | DIASTOLIC BLOOD PRESSURE: 90 MMHG | OXYGEN SATURATION: 96 % | TEMPERATURE: 98 F | SYSTOLIC BLOOD PRESSURE: 159 MMHG

## 2022-01-24 PROCEDURE — 99214 OFFICE O/P EST MOD 30 MIN: CPT | Mod: 25

## 2022-01-24 PROCEDURE — 36415 COLL VENOUS BLD VENIPUNCTURE: CPT

## 2022-01-24 NOTE — CONSULT LETTER
[Dear  ___] : Dear  [unfilled], [Courtesy Letter:] : I had the pleasure of seeing your patient, [unfilled], in my office today. [Please see my note below.] : Please see my note below. [Sincerely,] : Sincerely, [FreeTextEntry2] : Anmol Schaeffer\par Internal Medicine

## 2022-01-24 NOTE — REVIEW OF SYSTEMS
[Abdominal Pain] : abdominal pain [Diarrhea] : diarrhea [Arthralgias] : arthralgias [Negative] : Heme/Lymph [Fever] : no fever [Chills] : no chills [Feeling Poorly] : not feeling poorly [Feeling Tired] : not feeling tired [Recent Weight Gain (___ Lbs)] : no recent weight gain [Recent Weight Loss (___ Lbs)] : no recent weight loss [Eye Pain] : no eye pain [Red Eyes] : eyes not red [Eyesight Problems] : no eyesight problems [Discharge From Eyes] : no purulent discharge from the eyes [Dry Eyes] : no dryness of the eyes [Eyes Itch] : no itching of the eyes [Earache] : no earache [Loss Of Hearing] : no hearing loss [Heart Rate Is Slow] : the heart rate was not slow [Heart Rate Is Fast] : the heart rate was not fast [Chest Pain] : no chest pain [Palpitations] : no palpitations [Leg Claudication] : no intermittent leg claudication [Lower Ext Edema] : no extremity edema [Shortness Of Breath] : no shortness of breath [Wheezing] : no wheezing [Cough] : no cough [SOB on Exertion] : no shortness of breath during exertion [Orthopnea] : no orthopnea [PND] : no PND [Vomiting] : no vomiting [Constipation] : no constipation [Heartburn] : no heartburn [Melena] : no melena [Joint Pain] : no joint pain [Joint Swelling] : no joint swelling [Joint Stiffness] : no joint stiffness [Itching] : no itching [de-identified] : much improved. Psoriasis

## 2022-01-24 NOTE — PHYSICAL EXAM
[General Appearance - Alert] : alert [General Appearance - In No Acute Distress] : in no acute distress [] : no respiratory distress [Auscultation Breath Sounds / Voice Sounds] : lungs were clear to auscultation bilaterally [Heart Rate And Rhythm] : heart rate was normal and rhythm regular [Heart Sounds] : normal S1 and S2 [Heart Sounds Gallop] : no gallops [Murmurs] : no murmurs [Heart Sounds Pericardial Friction Rub] : no pericardial rub [Edema] : there was no peripheral edema [Musculoskeletal - Swelling] : no joint swelling seen [Sensation] : the sensory exam was normal to light touch and pinprick [Motor Exam] : the motor exam was normal [No Focal Deficits] : no focal deficits [Oriented To Time, Place, And Person] : oriented to person, place, and time [Impaired Insight] : insight and judgment were intact [Affect] : the affect was normal [FreeTextEntry1] : + psoriasis elbows. No nail pitting

## 2022-01-24 NOTE — HISTORY OF PRESENT ILLNESS
[FreeTextEntry1] : INTERVAL HX\par - tolerates the meds without weight loss or depression.  does have loose stools but the med works so well for him he feels it is tolerable\par - denies swelling, stiffness or pain currently \par - denies dactylitis\par - skin is manageable\par - [facility] - was aable to walk the entire time - walked about 30 miles in 5 days - no lmitation \par - AMS under 15 minute s\par - sometimes forgets to take meds\par

## 2022-01-25 ENCOUNTER — NON-APPOINTMENT (OUTPATIENT)
Age: 67
End: 2022-01-25

## 2022-01-25 LAB
25(OH)D3 SERPL-MCNC: 43.9 NG/ML
ALBUMIN SERPL ELPH-MCNC: 4.4 G/DL
ALP BLD-CCNC: 94 U/L
ALT SERPL-CCNC: 40 U/L
ANION GAP SERPL CALC-SCNC: 17 MMOL/L
AST SERPL-CCNC: 88 U/L
BASOPHILS # BLD AUTO: 0.03 K/UL
BASOPHILS NFR BLD AUTO: 0.8 %
BILIRUB SERPL-MCNC: 0.4 MG/DL
BUN SERPL-MCNC: 8 MG/DL
CALCIUM SERPL-MCNC: 9.5 MG/DL
CHLORIDE SERPL-SCNC: 103 MMOL/L
CO2 SERPL-SCNC: 21 MMOL/L
CREAT SERPL-MCNC: 0.7 MG/DL
CRP SERPL-MCNC: 11 MG/L
EOSINOPHIL # BLD AUTO: 0.07 K/UL
EOSINOPHIL NFR BLD AUTO: 1.8 %
ERYTHROCYTE [SEDIMENTATION RATE] IN BLOOD BY WESTERGREN METHOD: 53 MM/HR
HCT VFR BLD CALC: 37.8 %
HGB BLD-MCNC: 12.5 G/DL
IMM GRANULOCYTES NFR BLD AUTO: 0.3 %
LYMPHOCYTES # BLD AUTO: 1.24 K/UL
LYMPHOCYTES NFR BLD AUTO: 32.5 %
MAN DIFF?: NORMAL
MCHC RBC-ENTMCNC: 32.9 PG
MCHC RBC-ENTMCNC: 33.1 GM/DL
MCV RBC AUTO: 99.5 FL
MONOCYTES # BLD AUTO: 0.37 K/UL
MONOCYTES NFR BLD AUTO: 9.7 %
NEUTROPHILS # BLD AUTO: 2.1 K/UL
NEUTROPHILS NFR BLD AUTO: 54.9 %
PLATELET # BLD AUTO: 161 K/UL
POTASSIUM SERPL-SCNC: 3.9 MMOL/L
PROT SERPL-MCNC: 7.7 G/DL
RBC # BLD: 3.8 M/UL
RBC # FLD: 13 %
SODIUM SERPL-SCNC: 141 MMOL/L
VIT B12 SERPL-MCNC: 1943 PG/ML
WBC # FLD AUTO: 3.82 K/UL

## 2022-01-28 ENCOUNTER — APPOINTMENT (OUTPATIENT)
Dept: FAMILY MEDICINE | Facility: CLINIC | Age: 67
End: 2022-01-28
Payer: COMMERCIAL

## 2022-01-28 PROCEDURE — 90471 IMMUNIZATION ADMIN: CPT

## 2022-01-28 PROCEDURE — 90750 HZV VACC RECOMBINANT IM: CPT

## 2022-02-01 ENCOUNTER — NON-APPOINTMENT (OUTPATIENT)
Age: 67
End: 2022-02-01

## 2022-04-08 ENCOUNTER — MED ADMIN CHARGE (OUTPATIENT)
Age: 67
End: 2022-04-08

## 2022-04-08 ENCOUNTER — APPOINTMENT (OUTPATIENT)
Dept: FAMILY MEDICINE | Facility: CLINIC | Age: 67
End: 2022-04-08
Payer: COMMERCIAL

## 2022-04-08 PROCEDURE — 90750 HZV VACC RECOMBINANT IM: CPT | Mod: GY

## 2022-04-08 PROCEDURE — ZZZZZ: CPT

## 2022-04-08 PROCEDURE — 90471 IMMUNIZATION ADMIN: CPT

## 2022-04-11 PROBLEM — Z11.59 SCREENING FOR VIRAL DISEASE: Status: ACTIVE | Noted: 2021-06-07

## 2022-04-13 ENCOUNTER — APPOINTMENT (OUTPATIENT)
Dept: FAMILY MEDICINE | Facility: CLINIC | Age: 67
End: 2022-04-13

## 2022-05-16 ENCOUNTER — APPOINTMENT (OUTPATIENT)
Dept: RHEUMATOLOGY | Facility: CLINIC | Age: 67
End: 2022-05-16
Payer: COMMERCIAL

## 2022-05-16 VITALS
WEIGHT: 170 LBS | OXYGEN SATURATION: 98 % | BODY MASS INDEX: 29.02 KG/M2 | SYSTOLIC BLOOD PRESSURE: 140 MMHG | HEIGHT: 64 IN | DIASTOLIC BLOOD PRESSURE: 90 MMHG | HEART RATE: 112 BPM | TEMPERATURE: 97 F

## 2022-05-16 PROCEDURE — 36415 COLL VENOUS BLD VENIPUNCTURE: CPT

## 2022-05-16 PROCEDURE — 99214 OFFICE O/P EST MOD 30 MIN: CPT | Mod: 25

## 2022-05-17 ENCOUNTER — NON-APPOINTMENT (OUTPATIENT)
Age: 67
End: 2022-05-17

## 2022-05-17 LAB
25(OH)D3 SERPL-MCNC: 46.2 NG/ML
ALBUMIN SERPL ELPH-MCNC: 4.7 G/DL
ALP BLD-CCNC: 94 U/L
ALT SERPL-CCNC: 34 U/L
ANION GAP SERPL CALC-SCNC: 17 MMOL/L
AST SERPL-CCNC: 75 U/L
BASOPHILS # BLD AUTO: 0.05 K/UL
BASOPHILS NFR BLD AUTO: 1.2 %
BILIRUB SERPL-MCNC: 0.5 MG/DL
BUN SERPL-MCNC: 9 MG/DL
CALCIUM SERPL-MCNC: 10 MG/DL
CHLORIDE SERPL-SCNC: 103 MMOL/L
CO2 SERPL-SCNC: 22 MMOL/L
CREAT SERPL-MCNC: 0.66 MG/DL
CRP SERPL-MCNC: 31 MG/L
EGFR: 103 ML/MIN/1.73M2
EOSINOPHIL # BLD AUTO: 0.06 K/UL
EOSINOPHIL NFR BLD AUTO: 1.4 %
ERYTHROCYTE [SEDIMENTATION RATE] IN BLOOD BY WESTERGREN METHOD: 58 MM/HR
HCT VFR BLD CALC: 41.7 %
HGB BLD-MCNC: 13.3 G/DL
IMM GRANULOCYTES NFR BLD AUTO: 0.2 %
LYMPHOCYTES # BLD AUTO: 1.33 K/UL
LYMPHOCYTES NFR BLD AUTO: 31 %
MAN DIFF?: NORMAL
MCHC RBC-ENTMCNC: 31.9 GM/DL
MCHC RBC-ENTMCNC: 32.8 PG
MCV RBC AUTO: 103 FL
MONOCYTES # BLD AUTO: 0.54 K/UL
MONOCYTES NFR BLD AUTO: 12.6 %
NEUTROPHILS # BLD AUTO: 2.3 K/UL
NEUTROPHILS NFR BLD AUTO: 53.6 %
PLATELET # BLD AUTO: 155 K/UL
POTASSIUM SERPL-SCNC: 4.6 MMOL/L
PROT SERPL-MCNC: 7.9 G/DL
RBC # BLD: 4.05 M/UL
RBC # FLD: 14 %
SODIUM SERPL-SCNC: 143 MMOL/L
WBC # FLD AUTO: 4.29 K/UL

## 2022-05-17 NOTE — ASSESSMENT
[FreeTextEntry1] : Dandy is a 65-year-old PMHX HTN, Ps/PsA\par \par #PSA\par dx 2016 now controlled on combination of SSZ and otezla. chronic deformities without acute changes\par -- check inflammatory markers\par -- continue otezla \par -- continue SSZ\par \par #medicine monitoring. long term use of drug\par -- check labs\par -- Quant tb negative September 2021\par \par #inc LFT. \par likely 2/2 dx with fatty liver, he does consume ETOH on a daily basis. \par -- monitor closely given meds\par \par #Anemia- chronic, possibly mixed picture with normal mcv\par - now being treated for b12 def (replete) \par - will f/u with heme\par \par #HTN\par d/w patient increased bp.  not on meds and denies symptoms \par -- rec pcp f/u\par -- rec treatment given length of htn\par -- limit meloxicam\par \par More than 50% of the encounter was spent counseling the patient on differential, workup, disease course and treatment/management.  Education was provided to the patient during this encounter.  All questions and concerns were addressed and answered.   The patient verbalized understanding and agreed to the plan. \par \par Patient has been instructed to call for an appointment if new symptoms develop.\par Patient has been instructed to make a followup appointment in 3 months.\par

## 2022-05-17 NOTE — ADDENDUM
[FreeTextEntry1] : 5.17.22\par patient called about upcoming hemorrhoid surgery \par advised patient to, in an abundance of caution, hold his SSZ, and otezla 1 week prior and 1 week after surgery.

## 2022-05-17 NOTE — HISTORY OF PRESENT ILLNESS
[FreeTextEntry1] : INTERVAL HX\par in terms of the arthritis \par - doing okay and is stable -  tolerates the meds without weight loss or depression.  \par - denies swelling, stiffness or pain currently \par - denies dactylitis\par - skin is manageable\par - AMS under 15 min\par \par new issue is the increased BP \par - denies cp, sob, headache or visual changes \par - had bp check last week and was elevated as well \par - not taking decongestants \par \par new issue is rhinitis and allergies \par - gets this chronically eveyr year - though this year worse \par \par upcoming issue\par -  iminent jail - expects to move [facility] in the winter\par - sometimes forgets to take meds\par

## 2022-05-17 NOTE — REVIEW OF SYSTEMS
[Abdominal Pain] : abdominal pain [Diarrhea] : diarrhea [Arthralgias] : arthralgias [Negative] : Heme/Lymph [Fever] : no fever [Chills] : no chills [Feeling Poorly] : not feeling poorly [Feeling Tired] : not feeling tired [Recent Weight Gain (___ Lbs)] : no recent weight gain [Recent Weight Loss (___ Lbs)] : no recent weight loss [Eye Pain] : no eye pain [Red Eyes] : eyes not red [Eyesight Problems] : no eyesight problems [Discharge From Eyes] : no purulent discharge from the eyes [Dry Eyes] : no dryness of the eyes [Eyes Itch] : no itching of the eyes [Earache] : no earache [Loss Of Hearing] : no hearing loss [Heart Rate Is Slow] : the heart rate was not slow [Heart Rate Is Fast] : the heart rate was not fast [Chest Pain] : no chest pain [Palpitations] : no palpitations [Leg Claudication] : no intermittent leg claudication [Lower Ext Edema] : no extremity edema [Shortness Of Breath] : no shortness of breath [Wheezing] : no wheezing [Cough] : no cough [SOB on Exertion] : no shortness of breath during exertion [Orthopnea] : no orthopnea [PND] : no PND [Vomiting] : no vomiting [Constipation] : no constipation [Heartburn] : no heartburn [Melena] : no melena [Joint Pain] : no joint pain [Joint Swelling] : no joint swelling [Joint Stiffness] : no joint stiffness [Itching] : no itching [de-identified] : much improved. Psoriasis

## 2022-05-21 ENCOUNTER — EMERGENCY (EMERGENCY)
Facility: HOSPITAL | Age: 67
LOS: 0 days | Discharge: HOME | End: 2022-05-22
Attending: EMERGENCY MEDICINE
Payer: COMMERCIAL

## 2022-05-21 VITALS
RESPIRATION RATE: 18 BRPM | SYSTOLIC BLOOD PRESSURE: 135 MMHG | HEIGHT: 64 IN | OXYGEN SATURATION: 100 % | TEMPERATURE: 98 F | WEIGHT: 171.96 LBS | DIASTOLIC BLOOD PRESSURE: 99 MMHG | HEART RATE: 99 BPM
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
## 2022-05-21 DIAGNOSIS — S06.0X9A CONCUSSION WITH LOSS OF CONSCIOUSNESS OF UNSPECIFIED DURATION, INITIAL ENCOUNTER: ICD-10-CM

## 2022-05-21 DIAGNOSIS — S00.81XA ABRASION OF OTHER PART OF HEAD, INITIAL ENCOUNTER: ICD-10-CM
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
## 2022-05-21 DIAGNOSIS — F10.10 ALCOHOL ABUSE, UNCOMPLICATED: ICD-10-CM

## 2022-05-21 PROCEDURE — 70450 CT HEAD/BRAIN W/O DYE: CPT | Mod: MG

## 2022-05-21 PROCEDURE — G1004: CPT

## 2022-05-21 PROCEDURE — 99285 EMERGENCY DEPT VISIT HI MDM: CPT

## 2022-05-21 PROCEDURE — 72125 CT NECK SPINE W/O DYE: CPT | Mod: MG

## 2022-05-21 PROCEDURE — 72125 CT NECK SPINE W/O DYE: CPT | Mod: 26,MG

## 2022-05-21 PROCEDURE — 99284 EMERGENCY DEPT VISIT MOD MDM: CPT | Mod: 25

## 2022-05-21 PROCEDURE — 70450 CT HEAD/BRAIN W/O DYE: CPT | Mod: 26,MG

## 2022-05-21 NOTE — ED ADULT NURSE NOTE - CHIEF COMPLAINT QUOTE
Pt BIBA for head injury. Pt had mechanical fall at restaurant striking head causing abrasion noted to affected area. -LOC, -blood thinners - nausea and vomiting after episode. Pt states "too many alcoholic drinks".

## 2022-05-21 NOTE — ED ADULT NURSE NOTE - NSIMPLEMENTINTERV_GEN_ALL_ED
Implemented All Fall Risk Interventions:  [place] to call system. Call bell, personal items and telephone within reach. Instruct patient to call for assistance. Room bathroom lighting operational. Non-slip footwear when patient is off stretcher. Physically safe environment: no spills, clutter or unnecessary equipment. Stretcher in lowest position, wheels locked, appropriate side rails in place. Provide visual cue, wrist band, yellow gown, etc. Monitor gait and stability. Monitor for mental status changes and reorient to person, place, and time. Review medications for side effects contributing to fall risk. Reinforce activity limits and safety measures with patient and family.

## 2022-05-21 NOTE — ED ADULT NURSE NOTE - IS THE PATIENT ABLE TO BE SCREENED?
This condition is managed by Specialist.  
Key CKD Meds SENSIPAR 90 mg tab  (Taking) Take 30 mg by mouth daily. RENVELA 2.4 gram pwpk oral powder  (Taking) Take 1 Packet by mouth three (3) times daily (with meals). cholecalciferol (VITAMIN D3) 1,000 unit tablet  (Taking) Take 2 Tabs by mouth daily. No results found for: GFRNA, GFRNAPOC, GFRAA, GFRAAPOC, CREA, CREAPOC, CREATEXT, BUN, IBUN, BUNPOC, NA, NAPOC, K, KPOCT, CL, CLPOC, CO2, CO2POC, CA, MG, PHOS, VITD3, ALBEU, PTH, PTHILT, EPO CrCl cannot be calculated (Patient's most recent sCr result is older than the maximum 180 days allowed. ). Yes

## 2022-05-21 NOTE — ED ADULT TRIAGE NOTE - CHIEF COMPLAINT QUOTE
Pt BIBA for head injury. Pt had mechanical fall at restaurant striking head causing abrasion noted to affected area. -LOC, -blood thinners - nausea and vomiting after episode. Pt BIBA for head injury. Pt had mechanical fall at restaurant striking head causing abrasion noted to affected area. -LOC, -blood thinners - nausea and vomiting after episode. Pt states "too many alcoholic drinks".

## 2022-05-21 NOTE — ED ADULT NURSE NOTE - OBJECTIVE STATEMENT
bib ems pt states "I drank too many scotches, about 4. got dizzy and fell off the bar stool". mechanical fall " Head laceration denies loc, blood thinners. denies cp/sob/n/v/d/f.

## 2022-05-27 ENCOUNTER — NON-APPOINTMENT (OUTPATIENT)
Age: 67
End: 2022-05-27

## 2022-05-30 ENCOUNTER — FORM ENCOUNTER (OUTPATIENT)
Age: 67
End: 2022-05-30

## 2022-06-01 ENCOUNTER — APPOINTMENT (OUTPATIENT)
Dept: FAMILY MEDICINE | Facility: CLINIC | Age: 67
End: 2022-06-01

## 2022-06-01 ENCOUNTER — APPOINTMENT (OUTPATIENT)
Dept: FAMILY MEDICINE | Facility: CLINIC | Age: 67
End: 2022-06-01
Payer: COMMERCIAL

## 2022-06-01 VITALS
SYSTOLIC BLOOD PRESSURE: 150 MMHG | OXYGEN SATURATION: 97 % | WEIGHT: 172 LBS | DIASTOLIC BLOOD PRESSURE: 84 MMHG | HEART RATE: 85 BPM | TEMPERATURE: 97.6 F | BODY MASS INDEX: 29.37 KG/M2 | HEIGHT: 64 IN

## 2022-06-01 PROCEDURE — 99214 OFFICE O/P EST MOD 30 MIN: CPT

## 2022-06-01 NOTE — ASSESSMENT
[Patient Optimized for Surgery] : Patient optimized for surgery [No Further Testing Recommended] : no further testing recommended [FreeTextEntry4] : His preop EKG shows a right bundle branch block, unchanged from his previous EKG. The machine reading from the EKG also states "anterolateral infarct, age undetermined". He has no history of any cardiac events and no cardiac symptoms. Given that the EKG is unchanged, he may proceed with surgery and will follow up with a cardiologist afterwards.\par \par His preop labs show mild anemia with a Hgb of 12.2. He has labs checked regularly by his rheumatologist and his last Hgb was 13.3 on 5/16/22. His AST is elevated at 118 and his ALT is 56. His usual AST is in the 70-90 range and his ALT is usually 30-45. None of these labs are so abnormal that he cannot proceed with surgery, but he will need to follow up to recheck his labs after surgery.

## 2022-06-01 NOTE — PHYSICAL EXAM
[No Carotid Bruits] : no carotid bruits [No Edema] : there was no peripheral edema [Soft] : abdomen soft [Non Tender] : non-tender [Non-distended] : non-distended [No CVA Tenderness] : no CVA  tenderness [Grossly Normal Strength/Tone] : grossly normal strength/tone [No Focal Deficits] : no focal deficits [Normal] : affect was normal and insight and judgment were intact [Normal Rate] : normal rate  [Regular Rhythm] : with a regular rhythm [Normal S1, S2] : normal S1 and S2 [de-identified] : 2/6 systolic murmur loudest at the base

## 2022-06-01 NOTE — HISTORY OF PRESENT ILLNESS
[No Pertinent Cardiac History] : no history of aortic stenosis, atrial fibrillation, coronary artery disease, recent myocardial infarction, or implantable device/pacemaker [No Pertinent Pulmonary History] : no history of asthma, COPD, sleep apnea, or smoking [No Adverse Anesthesia Reaction] : no adverse anesthesia reaction in self or family member [(Patient denies any chest pain, claudication, dyspnea on exertion, orthopnea, palpitations or syncope)] : Patient denies any chest pain, claudication, dyspnea on exertion, orthopnea, palpitations or syncope [Chronic Anticoagulation] : no chronic anticoagulation [Chronic Kidney Disease] : no chronic kidney disease [Diabetes] : no diabetes [FreeTextEntry1] : hemorrhoid surgery [FreeTextEntry2] : 6/7/22 [FreeTextEntry3] : Dr. Willie Messer [FreeTextEntry4] : Patient has a history of hemorrhoids and is scheduled for surgery at [facility] as above. He has had worsening pain and bleeding of both internal and external hemorrhoids. \par \par He has a history of psoriatic arthritis and is taking Otezla and Sulfasalazine for this. His rheumatologist advised him that he could hold both of these medications for one week before and one week after surgery to minimize risk of infection.\par \par He had presurgical testing done at [facility] yesterday.

## 2022-06-01 NOTE — PLAN
[FreeTextEntry1] : Based on examination there are no acute contraindications to proceeding with above listed surgery at this time. Patient was advised to avoid all NSAIDs/ vitamins/ supplements for 1 week prior to surgery. He may resume these post-operatively when advised to do so by his surgeon. Reviewed risks of constipation and DVT post operatively and ways to decrease risk for these issues, including limiting  frequency and duration of opioid medication, increasing  fluid and fiber intake, early and frequent mobilization, and if necessary using Miralax and/or Dulcolax (short term).

## 2022-06-02 ENCOUNTER — INPATIENT (INPATIENT)
Facility: HOSPITAL | Age: 67
LOS: 12 days | Discharge: HOME | DRG: 501 | End: 2022-06-15
Attending: SURGERY | Admitting: SURGERY
Payer: COMMERCIAL

## 2022-06-02 VITALS
RESPIRATION RATE: 16 BRPM | HEART RATE: 68 BPM | DIASTOLIC BLOOD PRESSURE: 94 MMHG | OXYGEN SATURATION: 98 % | SYSTOLIC BLOOD PRESSURE: 184 MMHG | TEMPERATURE: 98 F

## 2022-06-02 DIAGNOSIS — S12.000A UNSPECIFIED DISPLACED FRACTURE OF FIRST CERVICAL VERTEBRA, INITIAL ENCOUNTER FOR CLOSED FRACTURE: ICD-10-CM

## 2022-06-02 LAB
ABO RH CONFIRMATION: SIGNIFICANT CHANGE UP
ALBUMIN SERPL ELPH-MCNC: 4.2 G/DL — SIGNIFICANT CHANGE UP (ref 3.3–5.2)
ALP SERPL-CCNC: 106 U/L — SIGNIFICANT CHANGE UP (ref 40–120)
ALT FLD-CCNC: 47 U/L — HIGH
ANION GAP SERPL CALC-SCNC: 15 MMOL/L — SIGNIFICANT CHANGE UP (ref 5–17)
APTT BLD: 34.3 SEC — SIGNIFICANT CHANGE UP (ref 27.5–35.5)
AST SERPL-CCNC: 101 U/L — HIGH
BASOPHILS # BLD AUTO: 0.03 K/UL — SIGNIFICANT CHANGE UP (ref 0–0.2)
BASOPHILS NFR BLD AUTO: 0.6 % — SIGNIFICANT CHANGE UP (ref 0–2)
BILIRUB SERPL-MCNC: 0.3 MG/DL — LOW (ref 0.4–2)
BLD GP AB SCN SERPL QL: SIGNIFICANT CHANGE UP
BUN SERPL-MCNC: 12.3 MG/DL — SIGNIFICANT CHANGE UP (ref 8–20)
CALCIUM SERPL-MCNC: 9 MG/DL — SIGNIFICANT CHANGE UP (ref 8.6–10.2)
CHLORIDE SERPL-SCNC: 102 MMOL/L — SIGNIFICANT CHANGE UP (ref 98–107)
CO2 SERPL-SCNC: 23 MMOL/L — SIGNIFICANT CHANGE UP (ref 22–29)
CREAT SERPL-MCNC: 0.75 MG/DL — SIGNIFICANT CHANGE UP (ref 0.5–1.3)
EGFR: 100 ML/MIN/1.73M2 — SIGNIFICANT CHANGE UP
EOSINOPHIL # BLD AUTO: 0.05 K/UL — SIGNIFICANT CHANGE UP (ref 0–0.5)
EOSINOPHIL NFR BLD AUTO: 0.9 % — SIGNIFICANT CHANGE UP (ref 0–6)
ETHANOL SERPL-MCNC: 280 MG/DL — HIGH (ref 0–9)
GLUCOSE SERPL-MCNC: 99 MG/DL — SIGNIFICANT CHANGE UP (ref 70–99)
HCT VFR BLD CALC: 34.2 % — LOW (ref 39–50)
HGB BLD-MCNC: 11.5 G/DL — LOW (ref 13–17)
IMM GRANULOCYTES NFR BLD AUTO: 0.4 % — SIGNIFICANT CHANGE UP (ref 0–1.5)
INR BLD: 1.03 RATIO — SIGNIFICANT CHANGE UP (ref 0.88–1.16)
LACTATE SERPL-SCNC: 3.2 MMOL/L — HIGH (ref 0.5–2)
LIDOCAIN IGE QN: 36 U/L — SIGNIFICANT CHANGE UP (ref 22–51)
LYMPHOCYTES # BLD AUTO: 2.16 K/UL — SIGNIFICANT CHANGE UP (ref 1–3.3)
LYMPHOCYTES # BLD AUTO: 40.7 % — SIGNIFICANT CHANGE UP (ref 13–44)
MCHC RBC-ENTMCNC: 33.1 PG — SIGNIFICANT CHANGE UP (ref 27–34)
MCHC RBC-ENTMCNC: 33.6 GM/DL — SIGNIFICANT CHANGE UP (ref 32–36)
MCV RBC AUTO: 98.6 FL — SIGNIFICANT CHANGE UP (ref 80–100)
MONOCYTES # BLD AUTO: 0.44 K/UL — SIGNIFICANT CHANGE UP (ref 0–0.9)
MONOCYTES NFR BLD AUTO: 8.3 % — SIGNIFICANT CHANGE UP (ref 2–14)
NEUTROPHILS # BLD AUTO: 2.61 K/UL — SIGNIFICANT CHANGE UP (ref 1.8–7.4)
NEUTROPHILS NFR BLD AUTO: 49.1 % — SIGNIFICANT CHANGE UP (ref 43–77)
PLATELET # BLD AUTO: 155 K/UL — SIGNIFICANT CHANGE UP (ref 150–400)
POTASSIUM SERPL-MCNC: 3.7 MMOL/L — SIGNIFICANT CHANGE UP (ref 3.5–5.3)
POTASSIUM SERPL-SCNC: 3.7 MMOL/L — SIGNIFICANT CHANGE UP (ref 3.5–5.3)
PROT SERPL-MCNC: 7.6 G/DL — SIGNIFICANT CHANGE UP (ref 6.6–8.7)
PROTHROM AB SERPL-ACNC: 11.9 SEC — SIGNIFICANT CHANGE UP (ref 10.5–13.4)
RBC # BLD: 3.47 M/UL — LOW (ref 4.2–5.8)
RBC # FLD: 13.3 % — SIGNIFICANT CHANGE UP (ref 10.3–14.5)
SARS-COV-2 RNA SPEC QL NAA+PROBE: SIGNIFICANT CHANGE UP
SODIUM SERPL-SCNC: 140 MMOL/L — SIGNIFICANT CHANGE UP (ref 135–145)
WBC # BLD: 5.31 K/UL — SIGNIFICANT CHANGE UP (ref 3.8–10.5)
WBC # FLD AUTO: 5.31 K/UL — SIGNIFICANT CHANGE UP (ref 3.8–10.5)

## 2022-06-02 PROCEDURE — 73110 X-RAY EXAM OF WRIST: CPT | Mod: 26,RT

## 2022-06-02 PROCEDURE — 71260 CT THORAX DX C+: CPT | Mod: 26,MA

## 2022-06-02 PROCEDURE — 99285 EMERGENCY DEPT VISIT HI MDM: CPT

## 2022-06-02 PROCEDURE — 73130 X-RAY EXAM OF HAND: CPT | Mod: 26,RT

## 2022-06-02 PROCEDURE — 73090 X-RAY EXAM OF FOREARM: CPT | Mod: 26,LT

## 2022-06-02 PROCEDURE — 70450 CT HEAD/BRAIN W/O DYE: CPT | Mod: 26,MA

## 2022-06-02 PROCEDURE — 99222 1ST HOSP IP/OBS MODERATE 55: CPT

## 2022-06-02 PROCEDURE — 74177 CT ABD & PELVIS W/CONTRAST: CPT | Mod: 26,MA

## 2022-06-02 PROCEDURE — 72125 CT NECK SPINE W/O DYE: CPT | Mod: 26,MA

## 2022-06-02 PROCEDURE — 72170 X-RAY EXAM OF PELVIS: CPT | Mod: 26

## 2022-06-02 PROCEDURE — 99253 IP/OBS CNSLTJ NEW/EST LOW 45: CPT

## 2022-06-02 PROCEDURE — 71045 X-RAY EXAM CHEST 1 VIEW: CPT | Mod: 26

## 2022-06-02 RX ORDER — HYDROMORPHONE HYDROCHLORIDE 2 MG/ML
0.5 INJECTION INTRAMUSCULAR; INTRAVENOUS; SUBCUTANEOUS EVERY 4 HOURS
Refills: 0 | Status: DISCONTINUED | OUTPATIENT
Start: 2022-06-02 | End: 2022-06-08

## 2022-06-02 RX ORDER — CEFAZOLIN SODIUM 1 G
2000 VIAL (EA) INJECTION ONCE
Refills: 0 | Status: COMPLETED | OUTPATIENT
Start: 2022-06-02 | End: 2022-06-02

## 2022-06-02 RX ORDER — GABAPENTIN 400 MG/1
100 CAPSULE ORAL EVERY 8 HOURS
Refills: 0 | Status: DISCONTINUED | OUTPATIENT
Start: 2022-06-02 | End: 2022-06-06

## 2022-06-02 RX ORDER — LIDOCAINE 4 G/100G
1 CREAM TOPICAL DAILY
Refills: 0 | Status: DISCONTINUED | OUTPATIENT
Start: 2022-06-02 | End: 2022-06-15

## 2022-06-02 RX ORDER — SODIUM CHLORIDE 9 MG/ML
1000 INJECTION, SOLUTION INTRAVENOUS
Refills: 0 | Status: DISCONTINUED | OUTPATIENT
Start: 2022-06-02 | End: 2022-06-04

## 2022-06-02 RX ORDER — KETOROLAC TROMETHAMINE 30 MG/ML
15 SYRINGE (ML) INJECTION EVERY 6 HOURS
Refills: 0 | Status: DISCONTINUED | OUTPATIENT
Start: 2022-06-02 | End: 2022-06-04

## 2022-06-02 RX ORDER — THIAMINE MONONITRATE (VIT B1) 100 MG
500 TABLET ORAL EVERY 8 HOURS
Refills: 0 | Status: DISCONTINUED | OUTPATIENT
Start: 2022-06-02 | End: 2022-06-04

## 2022-06-02 RX ORDER — SODIUM CHLORIDE 9 MG/ML
250 INJECTION INTRAMUSCULAR; INTRAVENOUS; SUBCUTANEOUS ONCE
Refills: 0 | Status: COMPLETED | OUTPATIENT
Start: 2022-06-02 | End: 2022-06-02

## 2022-06-02 RX ORDER — CHLORHEXIDINE GLUCONATE 213 G/1000ML
1 SOLUTION TOPICAL DAILY
Refills: 0 | Status: DISCONTINUED | OUTPATIENT
Start: 2022-06-02 | End: 2022-06-09

## 2022-06-02 RX ORDER — SODIUM CHLORIDE 9 MG/ML
1000 INJECTION, SOLUTION INTRAVENOUS
Refills: 0 | Status: DISCONTINUED | OUTPATIENT
Start: 2022-06-02 | End: 2022-06-02

## 2022-06-02 RX ORDER — OXYCODONE HYDROCHLORIDE 5 MG/1
5 TABLET ORAL EVERY 6 HOURS
Refills: 0 | Status: DISCONTINUED | OUTPATIENT
Start: 2022-06-02 | End: 2022-06-06

## 2022-06-02 RX ORDER — TETANUS TOXOID, REDUCED DIPHTHERIA TOXOID AND ACELLULAR PERTUSSIS VACCINE, ADSORBED 5; 2.5; 8; 8; 2.5 [IU]/.5ML; [IU]/.5ML; UG/.5ML; UG/.5ML; UG/.5ML
0.5 SUSPENSION INTRAMUSCULAR ONCE
Refills: 0 | Status: COMPLETED | OUTPATIENT
Start: 2022-06-02 | End: 2022-06-02

## 2022-06-02 RX ORDER — FOLIC ACID 0.8 MG
1 TABLET ORAL DAILY
Refills: 0 | Status: DISCONTINUED | OUTPATIENT
Start: 2022-06-02 | End: 2022-06-04

## 2022-06-02 RX ORDER — ACETAMINOPHEN 500 MG
975 TABLET ORAL EVERY 6 HOURS
Refills: 0 | Status: DISCONTINUED | OUTPATIENT
Start: 2022-06-02 | End: 2022-06-15

## 2022-06-02 RX ADMIN — SODIUM CHLORIDE 250 MILLILITER(S): 9 INJECTION INTRAMUSCULAR; INTRAVENOUS; SUBCUTANEOUS at 22:01

## 2022-06-02 RX ADMIN — Medication 2000 MILLIGRAM(S): at 22:01

## 2022-06-02 RX ADMIN — Medication 100 MILLIGRAM(S): at 21:20

## 2022-06-02 RX ADMIN — TETANUS TOXOID, REDUCED DIPHTHERIA TOXOID AND ACELLULAR PERTUSSIS VACCINE, ADSORBED 0.5 MILLILITER(S): 5; 2.5; 8; 8; 2.5 SUSPENSION INTRAMUSCULAR at 21:19

## 2022-06-02 NOTE — ED PROVIDER NOTE - CARE PLAN
1 Principal Discharge DX:	Closed C1 fracture  Secondary Diagnosis:	Fracture of one rib, closed  Secondary Diagnosis:	Alcohol intoxication

## 2022-06-02 NOTE — H&P ADULT - NS ATTEND AMEND GEN_ALL_CORE FT
I have seen and examined the patient on arrival.  Intoxicated fell down stairs,  confused RUE bleeding form lacerations.    On arrival:  ABCD Intact  HD normal  GCS 14 (confused) SCHUMACHER no gross sensory-motor deficit.  right ear lacerations, right dorsal 2 cm laceration with exposed extensors muscles and bleeding  no gross new extremity deformities  CXR no PTX  P{an scanned: on my reviewed: no ICH, C 1 left lateral mass fx, left rib 7-8 fx, small FLAVIO, no PTXC , no abd injuires    A/P  C 1 fractures, rib fractures.  Admit to trauma ==> SICUY  spine consult  Laceration: bleeding suture ligated with 4-O prolene, and closed after washout.  Ancef Toxoid  Spine consult   Needs CTA to rule out BCVI

## 2022-06-02 NOTE — ED PROVIDER NOTE - OBJECTIVE STATEMENT
Pt BIBA for head injury. Pt had mechanical fall at restaurant striking head causing abrasion to foreheaad. notes mild genralized achy non radiating headache . no vision changes. no blood thinners. no chest pain or sob. no abd pain no n/v/d

## 2022-06-02 NOTE — ED ADULT TRIAGE NOTE - CHIEF COMPLAINT QUOTE
BIBA s/p fall down 14 steps, +LOC. no blood thinners. code trauma b activated and patient brought to trauma room.

## 2022-06-02 NOTE — H&P ADULT - HISTORY OF PRESENT ILLNESS
Patient is a 67 yo M s/p fall from about 15 stairs while intoxicated. Reports pain in his R wrist where there is an active bleeding laceration. No other complaints at this time.  Patient is a 65 yo M s/p fall from about 15 stairs while intoxicated. Reports pain in his R wrist where there is an active bleeding laceration. No other complaints at this time. Confused, GCS 14, hemodynamically stable. Denies chest pain, SOB, dyspnea, abdominal pain, N/V, dizziness.       CXR no PTX  P{an scanned: on my reviewed: no ICH, C 1 left lateral mass fx, left rib 7-8 fx, small FLAVIO, no PTXC , no abd injuires    A/P  C 1 fractures, rib fractures.  Admit to trauma ==> SICUY  spine consult  Laceration: bleeding suture ligated with 4-O prolene, and closed after washout.  Ancef Toxoid  Spine consult   Needs CTA to rule out BCVI.   Patient is a 67 yo M s/p fall from about 15 stairs while intoxicated. Reports pain in his R wrist where there is an active bleeding laceration. No other complaints at this time. Confused, GCS 14, hemodynamically stable. Denies chest pain, SOB, dyspnea, abdominal pain, N/V, dizziness.      Patient is a 67 yo M s/p fall from about 15 stairs while intoxicated, trauma B activation. Reports pain in his R wrist where there is an active bleeding laceration. No other complaints at this time. Confused, GCS 14, hemodynamically stable. Denies taking any blood thinners at home. Denies chest pain, SOB, dyspnea, abdominal pain, N/V, dizziness.

## 2022-06-02 NOTE — ED ADULT TRIAGE NOTE - NS ED TRIAGE AVPU SCALE
Malnutrition Notification Alert-The patient is alert, awake and responds to voice. The patient is oriented to time, place, and person. The triage nurse is able to obtain subjective information.

## 2022-06-02 NOTE — ED ADULT TRIAGE NOTE - MEANS OF ARRIVAL
Advance care planning Advance care planning Advance care planning Advance care planning stretcher Advance care planning Advance care planning Advance care planning Advance care planning Advance care planning

## 2022-06-02 NOTE — H&P ADULT - NSHPPHYSICALEXAM_GEN_ALL_CORE
Primary Survey:  A: Intact  B: Breath sounds equal b/l with symmetric rise and fall of the chest  C: Central and peripheral pulses intact b/l  D: PERRL, GCS 14, confused   E: Patient fully exposed for exam and then covered with warm blankets Primary Survey:  A: Intact  B: Breath sounds equal b/l with symmetric rise and fall of the chest  C: Central and peripheral pulses intact b/l  D: PERRL, GCS 14, confused   E: Patient fully exposed for exam and then covered with warm blankets    HEENT: No scalp tenderness, no facial tenderness, Pupils 1mm, reactive bilaterally, + R ear laceration with dried blood/no active bleeding  Neck: Trachea midline, no swelling, no JVD, cervical collar in place  Pulm: CTAB, equal air entry  Cardiac: S1S2  Back: Without midline tenderness, no step offs   GI: Soft, NTTP, no ecchymosis, pelvis stable  Vascular: + femoral, radial, and DP pulses b/l  Neuro: Confused, no focal deficit, motor and sensory function intact throughout  MSK: No chest tenderness, no noted areas of deformity or point tenderness, FROM without pain  Skin: + right dorsal wrist laceration, actively bleeding with exposed muscle tendons Primary Survey:  A: Intact  B: Breath sounds equal b/l with symmetric rise and fall of the chest  C: Central and peripheral pulses intact b/l  D: PERRL, GCS 14, confused   E: Patient fully exposed for exam and then covered with warm blankets    HEENT: No scalp tenderness, no facial tenderness, Pupils 1mm, reactive bilaterally, + R ear laceration with dried blood/no active bleeding  Neck: Trachea midline, no swelling, no JVD, cervical collar in place  Pulm: CTAB, equal air entry  Cardiac: S1S2  Back: Without midline tenderness, no step offs   GI: Soft, NTTP, no ecchymosis, pelvis stable  Vascular: + femoral, radial, and DP pulses b/l  Neuro: Confused, no focal deficit, motor and sensory function intact throughout  MSK: No chest tenderness, no noted areas of deformity or point tenderness, FROM without pain  Skin: + right dorsal wrist laceration, actively bleeding with exposed muscle

## 2022-06-02 NOTE — ED PROVIDER NOTE - OBJECTIVE STATEMENT
65 y/o male with PMHx of HLD presents to the ED s/p fall down 14 steps, + LOC, no anticoagulation use. Per EMS they found him face down at bottom of steps after family called, states he is mildly confused. + etoh use.

## 2022-06-02 NOTE — ED PROVIDER NOTE - CLINICAL SUMMARY MEDICAL DECISION MAKING FREE TEXT BOX
clincally sober ambulating in nad return to ed for intractable HA, persistent vomiting, or new onset motor/sensory deficits

## 2022-06-02 NOTE — ED PROVIDER NOTE - PROGRESS NOTE DETAILS
Madalyn Garrison for ED attending, Dr. Chavez: called to evaluate pt upon arrival, upon story per ems and brief clinical evaluation code trauma B activated

## 2022-06-02 NOTE — ED ADULT NURSE NOTE - NSIMPLEMENTINTERV_GEN_ALL_ED
Implemented All Fall with Harm Risk Interventions:  [place] to call system. Call bell, personal items and telephone within reach. Instruct patient to call for assistance. Room bathroom lighting operational. Non-slip footwear when patient is off stretcher. Physically safe environment: no spills, clutter or unnecessary equipment. Stretcher in lowest position, wheels locked, appropriate side rails in place. Provide visual cue, wrist band, yellow gown, etc. Monitor gait and stability. Monitor for mental status changes and reorient to person, place, and time. Review medications for side effects contributing to fall risk. Reinforce activity limits and safety measures with patient and family. Provide visual clues: red socks.

## 2022-06-02 NOTE — ED PROVIDER NOTE - PATIENT PORTAL LINK FT
You can access the FollowMyHealth Patient Portal offered by [facility] by registering at the following website: [email or web address]. By joining [employer]’s FollowMyHealth portal, you will also be able to view your health information using other applications (apps) compatible with our system.

## 2022-06-02 NOTE — H&P ADULT - ASSESSMENT
Assessment: Patient is a 65 yo M s/p fall from 15 stairs while intoxicated.     Plan:   1. Follow up labs   2. PAN scan and follow up results   3. Wrist laceration sutured at bedside to control bleeding  4. Pain control  5. Antibiotics ordered  6. Tetanus given in trauma    Assessment: Patient is a 67 yo M s/p fall from 15 stairs while intoxicated.     Plan:   1. Follow up labs   2. PAN scan and follow up results - will admit to SICU, Pic protocol   3. Wrist laceration irrigated and sutured   4. Pain control  5. Antibiotics ordered  6. Tetanus given in trauma

## 2022-06-03 LAB
ANION GAP SERPL CALC-SCNC: 18 MMOL/L — HIGH (ref 5–17)
APPEARANCE UR: CLEAR — SIGNIFICANT CHANGE UP
BASOPHILS # BLD AUTO: 0.02 K/UL — SIGNIFICANT CHANGE UP (ref 0–0.2)
BASOPHILS NFR BLD AUTO: 0.3 % — SIGNIFICANT CHANGE UP (ref 0–2)
BILIRUB UR-MCNC: NEGATIVE — SIGNIFICANT CHANGE UP
BUN SERPL-MCNC: 11.3 MG/DL — SIGNIFICANT CHANGE UP (ref 8–20)
CALCIUM SERPL-MCNC: 8.5 MG/DL — LOW (ref 8.6–10.2)
CHLORIDE SERPL-SCNC: 102 MMOL/L — SIGNIFICANT CHANGE UP (ref 98–107)
CO2 SERPL-SCNC: 22 MMOL/L — SIGNIFICANT CHANGE UP (ref 22–29)
COLOR SPEC: YELLOW — SIGNIFICANT CHANGE UP
CREAT SERPL-MCNC: 0.68 MG/DL — SIGNIFICANT CHANGE UP (ref 0.5–1.3)
DIFF PNL FLD: NEGATIVE — SIGNIFICANT CHANGE UP
EGFR: 103 ML/MIN/1.73M2 — SIGNIFICANT CHANGE UP
EOSINOPHIL # BLD AUTO: 0.01 K/UL — SIGNIFICANT CHANGE UP (ref 0–0.5)
EOSINOPHIL NFR BLD AUTO: 0.2 % — SIGNIFICANT CHANGE UP (ref 0–6)
GLUCOSE SERPL-MCNC: 114 MG/DL — HIGH (ref 70–99)
GLUCOSE UR QL: NEGATIVE MG/DL — SIGNIFICANT CHANGE UP
HCT VFR BLD CALC: 30.2 % — LOW (ref 39–50)
HCV AB S/CO SERPL IA: 0.17 S/CO — SIGNIFICANT CHANGE UP (ref 0–0.99)
HCV AB SERPL-IMP: SIGNIFICANT CHANGE UP
HGB BLD-MCNC: 10.3 G/DL — LOW (ref 13–17)
IMM GRANULOCYTES NFR BLD AUTO: 0.5 % — SIGNIFICANT CHANGE UP (ref 0–1.5)
KETONES UR-MCNC: NEGATIVE — SIGNIFICANT CHANGE UP
LACTATE SERPL-SCNC: 0.9 MMOL/L — SIGNIFICANT CHANGE UP (ref 0.5–2)
LEUKOCYTE ESTERASE UR-ACNC: NEGATIVE — SIGNIFICANT CHANGE UP
LYMPHOCYTES # BLD AUTO: 1.08 K/UL — SIGNIFICANT CHANGE UP (ref 1–3.3)
LYMPHOCYTES # BLD AUTO: 17.8 % — SIGNIFICANT CHANGE UP (ref 13–44)
MAGNESIUM SERPL-MCNC: 1.6 MG/DL — SIGNIFICANT CHANGE UP (ref 1.6–2.6)
MCHC RBC-ENTMCNC: 33.4 PG — SIGNIFICANT CHANGE UP (ref 27–34)
MCHC RBC-ENTMCNC: 34.1 GM/DL — SIGNIFICANT CHANGE UP (ref 32–36)
MCV RBC AUTO: 98.1 FL — SIGNIFICANT CHANGE UP (ref 80–100)
MONOCYTES # BLD AUTO: 0.53 K/UL — SIGNIFICANT CHANGE UP (ref 0–0.9)
MONOCYTES NFR BLD AUTO: 8.7 % — SIGNIFICANT CHANGE UP (ref 2–14)
MRSA PCR RESULT.: SIGNIFICANT CHANGE UP
NEUTROPHILS # BLD AUTO: 4.39 K/UL — SIGNIFICANT CHANGE UP (ref 1.8–7.4)
NEUTROPHILS NFR BLD AUTO: 72.5 % — SIGNIFICANT CHANGE UP (ref 43–77)
NITRITE UR-MCNC: NEGATIVE — SIGNIFICANT CHANGE UP
PH UR: 5 — SIGNIFICANT CHANGE UP (ref 5–8)
PHOSPHATE SERPL-MCNC: 4 MG/DL — SIGNIFICANT CHANGE UP (ref 2.4–4.7)
PLATELET # BLD AUTO: 126 K/UL — LOW (ref 150–400)
POTASSIUM SERPL-MCNC: 3.9 MMOL/L — SIGNIFICANT CHANGE UP (ref 3.5–5.3)
POTASSIUM SERPL-SCNC: 3.9 MMOL/L — SIGNIFICANT CHANGE UP (ref 3.5–5.3)
PROT UR-MCNC: NEGATIVE — SIGNIFICANT CHANGE UP
RBC # BLD: 3.08 M/UL — LOW (ref 4.2–5.8)
RBC # FLD: 13.2 % — SIGNIFICANT CHANGE UP (ref 10.3–14.5)
S AUREUS DNA NOSE QL NAA+PROBE: SIGNIFICANT CHANGE UP
SODIUM SERPL-SCNC: 142 MMOL/L — SIGNIFICANT CHANGE UP (ref 135–145)
SP GR SPEC: 1.01 — SIGNIFICANT CHANGE UP (ref 1.01–1.02)
UROBILINOGEN FLD QL: NEGATIVE MG/DL — SIGNIFICANT CHANGE UP
WBC # BLD: 6.06 K/UL — SIGNIFICANT CHANGE UP (ref 3.8–10.5)
WBC # FLD AUTO: 6.06 K/UL — SIGNIFICANT CHANGE UP (ref 3.8–10.5)

## 2022-06-03 PROCEDURE — 70498 CT ANGIOGRAPHY NECK: CPT | Mod: 26

## 2022-06-03 PROCEDURE — 71045 X-RAY EXAM CHEST 1 VIEW: CPT | Mod: 26

## 2022-06-03 PROCEDURE — 73200 CT UPPER EXTREMITY W/O DYE: CPT | Mod: 26,RT

## 2022-06-03 RX ORDER — LANOLIN ALCOHOL/MO/W.PET/CERES
5 CREAM (GRAM) TOPICAL AT BEDTIME
Refills: 0 | Status: DISCONTINUED | OUTPATIENT
Start: 2022-06-03 | End: 2022-06-06

## 2022-06-03 RX ORDER — MAGNESIUM SULFATE 500 MG/ML
2 VIAL (ML) INJECTION ONCE
Refills: 0 | Status: COMPLETED | OUTPATIENT
Start: 2022-06-03 | End: 2022-06-03

## 2022-06-03 RX ORDER — LANOLIN ALCOHOL/MO/W.PET/CERES
5 CREAM (GRAM) TOPICAL ONCE
Refills: 0 | Status: COMPLETED | OUTPATIENT
Start: 2022-06-03 | End: 2022-06-03

## 2022-06-03 RX ORDER — ONDANSETRON 8 MG/1
4 TABLET, FILM COATED ORAL EVERY 6 HOURS
Refills: 0 | Status: DISCONTINUED | OUTPATIENT
Start: 2022-06-03 | End: 2022-06-15

## 2022-06-03 RX ORDER — ONDANSETRON 8 MG/1
4 TABLET, FILM COATED ORAL ONCE
Refills: 0 | Status: COMPLETED | OUTPATIENT
Start: 2022-06-03 | End: 2022-06-03

## 2022-06-03 RX ADMIN — Medication 1 MILLIGRAM(S): at 12:20

## 2022-06-03 RX ADMIN — Medication 5 MILLIGRAM(S): at 21:24

## 2022-06-03 RX ADMIN — Medication 5 MILLIGRAM(S): at 03:38

## 2022-06-03 RX ADMIN — HYDROMORPHONE HYDROCHLORIDE 0.5 MILLIGRAM(S): 2 INJECTION INTRAMUSCULAR; INTRAVENOUS; SUBCUTANEOUS at 11:21

## 2022-06-03 RX ADMIN — HYDROMORPHONE HYDROCHLORIDE 0.5 MILLIGRAM(S): 2 INJECTION INTRAMUSCULAR; INTRAVENOUS; SUBCUTANEOUS at 21:24

## 2022-06-03 RX ADMIN — Medication 105 MILLIGRAM(S): at 21:23

## 2022-06-03 RX ADMIN — OXYCODONE HYDROCHLORIDE 5 MILLIGRAM(S): 5 TABLET ORAL at 18:26

## 2022-06-03 RX ADMIN — Medication 15 MILLIGRAM(S): at 01:39

## 2022-06-03 RX ADMIN — Medication 15 MILLIGRAM(S): at 01:55

## 2022-06-03 RX ADMIN — HYDROMORPHONE HYDROCHLORIDE 0.5 MILLIGRAM(S): 2 INJECTION INTRAMUSCULAR; INTRAVENOUS; SUBCUTANEOUS at 22:00

## 2022-06-03 RX ADMIN — ONDANSETRON 4 MILLIGRAM(S): 8 TABLET, FILM COATED ORAL at 00:30

## 2022-06-03 RX ADMIN — GABAPENTIN 100 MILLIGRAM(S): 400 CAPSULE ORAL at 21:24

## 2022-06-03 RX ADMIN — LIDOCAINE 1 PATCH: 4 CREAM TOPICAL at 19:00

## 2022-06-03 RX ADMIN — Medication 105 MILLIGRAM(S): at 06:06

## 2022-06-03 RX ADMIN — Medication 105 MILLIGRAM(S): at 14:52

## 2022-06-03 RX ADMIN — HYDROMORPHONE HYDROCHLORIDE 0.5 MILLIGRAM(S): 2 INJECTION INTRAMUSCULAR; INTRAVENOUS; SUBCUTANEOUS at 10:51

## 2022-06-03 RX ADMIN — Medication 50 MILLIGRAM(S): at 14:53

## 2022-06-03 RX ADMIN — ONDANSETRON 4 MILLIGRAM(S): 8 TABLET, FILM COATED ORAL at 12:19

## 2022-06-03 RX ADMIN — OXYCODONE HYDROCHLORIDE 5 MILLIGRAM(S): 5 TABLET ORAL at 17:56

## 2022-06-03 RX ADMIN — LIDOCAINE 1 PATCH: 4 CREAM TOPICAL at 12:21

## 2022-06-03 RX ADMIN — CHLORHEXIDINE GLUCONATE 1 APPLICATION(S): 213 SOLUTION TOPICAL at 12:20

## 2022-06-03 RX ADMIN — GABAPENTIN 100 MILLIGRAM(S): 400 CAPSULE ORAL at 14:53

## 2022-06-03 RX ADMIN — Medication 1 MILLIGRAM(S): at 12:15

## 2022-06-03 RX ADMIN — Medication 25 GRAM(S): at 07:38

## 2022-06-03 RX ADMIN — GABAPENTIN 100 MILLIGRAM(S): 400 CAPSULE ORAL at 06:06

## 2022-06-03 RX ADMIN — Medication 50 MILLIGRAM(S): at 21:23

## 2022-06-03 RX ADMIN — SODIUM CHLORIDE 75 MILLILITER(S): 9 INJECTION, SOLUTION INTRAVENOUS at 01:14

## 2022-06-03 NOTE — CONSULT NOTE ADULT - SUBJECTIVE AND OBJECTIVE BOX
HPI:  Patient is a 67 yo M s/p fall from about 15 stairs while intoxicated, trauma B activation. Reports pain in his R wrist where there is an active bleeding laceration. No other complaints at this time. Confused, GCS 14, hemodynamically stable. Denies taking any blood thinners at home. Denies chest pain, SOB, dyspnea, abdominal pain, N/V, dizziness.      (02 Jun 2022 21:17)    66M intoxicated fall down stairs no LOC no A/C, denies neck pain tingling numbness weakness bowel bladder changes no headache N/V    PMH HLD, RA  Psurg denies  All Pollen  Meds   apremilast 10 mg-20 mg-30 mg oral tablet: orally once a day (02 Jun 2022 22:59)  ergocalciferol 400 intl units (10 mcg) oral tablet:  (02 Jun 2022 22:59)  meloxicam 5 mg oral capsule: 1 cap(s) orally once a day (02 Jun 2022 22:59)        FAMILY HISTORY:  No pertinent family history in first degree relatives      REVIEW OF SYSTEMS  RESPIRATORY: No cough, wheezing,  CARDIOVASCULAR: No chest pain, palpitations,   GASTROINTESTINAL: No nausea, vomiting,   GENITOURINARY: No dysuria,   NEUROLOGICAL: HPI      MEDS  Neuro:  acetaminophen     Tablet .. 975 milliGRAM(s) Oral every 6 hours PRN  gabapentin 100 milliGRAM(s) Oral every 8 hours  HYDROmorphone  Injectable 0.5 milliGRAM(s) IV Push every 4 hours PRN  ketorolac   Injectable 15 milliGRAM(s) IV Push every 6 hours PRN  oxyCODONE    IR 5 milliGRAM(s) Oral every 6 hours PRN    OTHER:  chlorhexidine 2% Cloths 1 Application(s) Topical daily  lidocaine   4% Patch 1 Patch Transdermal daily    IVF:  folic acid Injectable 1 milliGRAM(s) IV Push daily  multiple electrolytes Injection Type 1 1000 milliLiter(s) IV Continuous <Continuous>  thiamine IVPB 500 milliGRAM(s) IV Intermittent every 8 hours      LABS:                        11.5   5.31  )-----------( 155      ( 02 Jun 2022 21:24 )             34.2     06-02    140  |  102  |  12.3  ----------------------------<  99  3.7   |  23.0  |  0.75    Ca    9.0      02 Jun 2022 21:24    TPro  7.6  /  Alb  4.2  /  TBili  0.3<L>  /  DBili  x   /  AST  101<H>  /  ALT  47<H>  /  AlkPhos  106  06-02    PT/INR - ( 02 Jun 2022 21:24 )   PT: 11.9 sec;   INR: 1.03 ratio         PTT - ( 02 Jun 2022 21:24 )  PTT:34.3 sec      RADIOLOGY & ADDITIONAL STUDIES:  CTH - no hemorrhage  CT C/spine C1 Fx left anterior arch - nondisplaced, no involvement of transverse foramina      Vital Signs Last 24 Hrs  T(C): 36.4 (02 Jun 2022 23:35), Max: 36.6 (02 Jun 2022 22:02)  T(F): 97.5 (02 Jun 2022 23:35), Max: 97.8 (02 Jun 2022 22:02)  HR: 76 (02 Jun 2022 23:35) (68 - 76)  BP: 146/76 (02 Jun 2022 23:35) (146/76 - 184/94)  BP(mean): --  RR: 16 (02 Jun 2022 23:35) (16 - 16)  SpO2: 97% (02 Jun 2022 23:35) (97% - 98%)      PHYSICAL EXAM:  Wearing cervical collar  Rt wrist laceration repaired, dressing in place  EYES: Conjunctiva and sclera clear; corneal reflex intact  GIOVANA COMA SCORE: E4- V5- M6- =15  MENTAL STATUS: AAO x3; EOMI smile symmetrical tongue midline SILT V123 b/l no drift  M5/5 UE LE b/l    C/spine  denies tenderness  M5/5 D B WE  T I/O b/l  Sens intact C5-8  DTR 2+ = B T BR

## 2022-06-03 NOTE — PATIENT PROFILE ADULT - VISION (WITH CORRECTIVE LENSES IF THE PATIENT USUALLY WEARS THEM):
patient states he had Cataract surgery about four years ago/Partially impaired: cannot see medication labels or newsprint, but can see obstacles in path, and the surrounding layout; can count fingers at arm's length

## 2022-06-03 NOTE — PATIENT PROFILE ADULT - FALL HARM RISK - HARM RISK INTERVENTIONS

## 2022-06-03 NOTE — PROGRESS NOTE ADULT - SUBJECTIVE AND OBJECTIVE BOX
24 HOUR EVENTS: Patient remains neurologically intact, pending ct angio to r/o BCVI and mri per neurosurgery. Cervical collar in place.  Patient had episode of nausea and vomiting on admission, given zofran with improvement. PIC scores 9. Thiamine and folate on board. Lactate of 3.2 given bolus and started on fluids.     SUBJECTIVE/ROS:  [x ] A ten-point review of systems was otherwise negative except as noted.  [ ] Due to altered mental status/intubation, subjective information were not able to be obtained from the patient. History was obtained, to the extent possible, from review of the chart and collateral sources of information.      NEURO  RASS:   0  GCS: 15    CAM ICU:  Exam: No focal neuro deficits, SCHUMACHER, cervical collar in place   Meds: acetaminophen     Tablet .. 975 milliGRAM(s) Oral every 6 hours PRN Mild Pain (1 - 3)  gabapentin 100 milliGRAM(s) Oral every 8 hours  HYDROmorphone  Injectable 0.5 milliGRAM(s) IV Push every 4 hours PRN Breakthrough pain  ketorolac   Injectable 15 milliGRAM(s) IV Push every 6 hours PRN Moderate Pain (4 - 6)  melatonin 5 milliGRAM(s) Oral at bedtime  ondansetron Injectable 4 milliGRAM(s) IV Push every 6 hours PRN Nausea and/or Vomiting  oxyCODONE    IR 5 milliGRAM(s) Oral every 6 hours PRN Severe Pain (7 - 10)    [x] Adequacy of sedation and pain control has been assessed and adjusted      RESPIRATORY  RR: 15 (06-03-22 @ 03:00) (13 - 19)  SpO2: 97% (06-02-22 @ 23:35) (97% - 98%)  Wt(kg): --  Exam: unlabored, clear to auscultation bilaterally  Mechanical Ventilation:   ABG - ( 02 Jun 2022 21:24 )  pH: x     /  pCO2: x     /  pO2: x     / HCO3: x     / Base Excess: x     /  SaO2: x       Lactate: 3.2              [ ] Extubation Readiness Assessed  Meds:       CARDIOVASCULAR  HR: 91 (06-03-22 @ 03:00) (68 - 91)  BP: 108/71 (06-03-22 @ 03:00) (105/66 - 184/94)  BP(mean): 82 (06-03-22 @ 03:00) (77 - 97)  ABP: --  ABP(mean): --  Wt(kg): --  CVP(cm H2O): --      Exam: nsr  Cardiac Rhythm: nsr  Perfusion     [x ]Adequate   [ ]Inadequate  Mentation   [x ]Normal       [ ]Reduced  Extremities  [x ]Warm         [ ]Cool  Volume Status [ ]Hypervolemic [x ]Euvolemic [ ]Hypovolemic  Meds:       GI/NUTRITION  Exam: soft, nttp, nd  Diet: NPO   Meds:     GENITOURINARY  I&O's Detail    Weight (kg): 74.9 (06-03 @ 00:40)  06-02    140  |  102  |  12.3  ----------------------------<  99  3.7   |  23.0  |  0.75    Ca    9.0      02 Jun 2022 21:24    TPro  7.6  /  Alb  4.2  /  TBili  0.3<L>  /  DBili  x   /  AST  101<H>  /  ALT  47<H>  /  AlkPhos  106  06-02    [ ] [place] catheter, indication: N/A  Meds: folic acid Injectable 1 milliGRAM(s) IV Push daily  multiple electrolytes Injection Type 1 1000 milliLiter(s) IV Continuous <Continuous>  thiamine IVPB 500 milliGRAM(s) IV Intermittent every 8 hours    Ext: L wrist with ace wrap    Skin: multiple skin lacerations- R ear adequate skin approximation, R wrist, R eyebrow     HEMATOLOGIC  Meds:   [x] VTE Prophylaxis                        11.5   5.31  )-----------( 155      ( 02 Jun 2022 21:24 )             34.2     PT/INR - ( 02 Jun 2022 21:24 )   PT: 11.9 sec;   INR: 1.03 ratio         PTT - ( 02 Jun 2022 21:24 )  PTT:34.3 sec  Transfusion     [ ] PRBC   [ ] Platelets   [ ] FFP   [ ] Cryoprecipitate      INFECTIOUS DISEASES  T(C): 36.6 (06-03-22 @ 00:40), Max: 36.6 (06-02-22 @ 22:02)  Wt(kg): --  WBC Count: 5.31 K/uL (06-02 @ 21:24)    Recent Cultures:    Meds:       ENDOCRINE  Capillary Blood Glucose    Meds:       ACCESS DEVICES:  [ ] Peripheral IV  [ ] Central Venous Line	[ ] R	[ ] L	[ ] IJ	[ ] Fem	[ ] SC	Placed:   [ ] Arterial Line		[ ] R	[ ] L	[ ] Fem	[ ] Rad	[ ] Ax	Placed:   [ ] PICC:					[ ] Mediport  [ ] Urinary Catheter, Date Placed:   [ ] Necessity of urinary, arterial, and venous catheters discussed    OTHER MEDICATIONS:  chlorhexidine 2% Cloths 1 Application(s) Topical daily  lidocaine   4% Patch 1 Patch Transdermal daily      CODE STATUS:     IMAGING:

## 2022-06-03 NOTE — PROGRESS NOTE ADULT - ASSESSMENT
Ap: 66yM s/p fall down stairs while intoxicated, patient has C1 L lateral arch fx and rib fx 6-8 with occult ptx    Neuro: Neuro checks, CT angio to r/o BCVI. MRI as per nsurg, cervical collar at all times. Adequate pain regimen. CIWA for withdrawal.     Card: Continue hemodynamic monitoring    Pulm: PIC protocol for rib fx. Monitor repeat CXR    GI: NPO for MRI    : Strict i/o's, aggressive resuscitation    Endo: No issues     ID: No issues    Skin: Bacitracin to skin laceration    Ext: f/u wrist xrays    Dispo: SICU

## 2022-06-03 NOTE — PATIENT PROFILE ADULT - FUNCTIONAL ASSESSMENT - BASIC MOBILITY 5.
albuterol 90 mcg/inh inhalation aerosol: 2 puff(s) inhaled 4 times a day, As Needed -for shortness of breath and/or wheezing   amLODIPine 10 mg oral tablet: 1 tab(s) orally once a day (at bedtime)  aspirin 81 mg oral tablet, chewable: 1 tab(s) orally once a day  calcium-vitamin D 500 mg-200 intl units (5 mcg) oral tablet: 1 tab(s) orally once a day  enalapril 10 mg oral tablet: 1 tab(s) orally once a day  levothyroxine 25 mcg (0.025 mg) oral tablet: 1 tab(s) orally once a day  melatonin 5 mg oral tablet: 1 tab(s) orally once a day (at bedtime)  Metoprolol Succinate  mg oral tablet, extended release: 1 tab(s) orally once a day  pantoprazole 40 mg oral delayed release tablet: 1 tab(s) orally 2 times a day  simvastatin 5 mg oral tablet: 1 tab(s) orally once a day (at bedtime)  Singulair 10 mg oral tablet: 1 tab(s) orally once a day  tamsulosin 0.4 mg oral capsule: 1 cap(s) orally once a day (at bedtime) 4 = No assist / stand by assistance

## 2022-06-03 NOTE — SBIRT NOTE ADULT - NSSBIRTBRIEFINTDET_GEN_A_CORE
pt willing to take resource list and review and feels he will be ready for outpt treatment. List given

## 2022-06-03 NOTE — PROGRESS NOTE ADULT - ATTENDING COMMENTS
66yM s/p fall down stairs while intoxicated, patient has C1 L lateral arch fx and rib fx 6-8 with occult small pneumothorax  Neuro intact  Hemodynamic intact slight HTN  Bilateral BS and pain well controlled  Lungs fully inflated  Abdomen soft    Plan   CTA head / carotids  OOB with C collar  Diet  Transfer to floor pending no surprises on CTA

## 2022-06-04 LAB
ANION GAP SERPL CALC-SCNC: 13 MMOL/L — SIGNIFICANT CHANGE UP (ref 5–17)
BASOPHILS # BLD AUTO: 0.02 K/UL — SIGNIFICANT CHANGE UP (ref 0–0.2)
BASOPHILS NFR BLD AUTO: 0.5 % — SIGNIFICANT CHANGE UP (ref 0–2)
BUN SERPL-MCNC: 11.9 MG/DL — SIGNIFICANT CHANGE UP (ref 8–20)
CALCIUM SERPL-MCNC: 8.3 MG/DL — LOW (ref 8.6–10.2)
CHLORIDE SERPL-SCNC: 102 MMOL/L — SIGNIFICANT CHANGE UP (ref 98–107)
CO2 SERPL-SCNC: 25 MMOL/L — SIGNIFICANT CHANGE UP (ref 22–29)
CREAT SERPL-MCNC: 0.54 MG/DL — SIGNIFICANT CHANGE UP (ref 0.5–1.3)
EGFR: 110 ML/MIN/1.73M2 — SIGNIFICANT CHANGE UP
EOSINOPHIL # BLD AUTO: 0.04 K/UL — SIGNIFICANT CHANGE UP (ref 0–0.5)
EOSINOPHIL NFR BLD AUTO: 0.9 % — SIGNIFICANT CHANGE UP (ref 0–6)
GLUCOSE SERPL-MCNC: 93 MG/DL — SIGNIFICANT CHANGE UP (ref 70–99)
HCT VFR BLD CALC: 29.7 % — LOW (ref 39–50)
HGB BLD-MCNC: 9.4 G/DL — LOW (ref 13–17)
IMM GRANULOCYTES NFR BLD AUTO: 0.5 % — SIGNIFICANT CHANGE UP (ref 0–1.5)
LYMPHOCYTES # BLD AUTO: 1.13 K/UL — SIGNIFICANT CHANGE UP (ref 1–3.3)
LYMPHOCYTES # BLD AUTO: 26 % — SIGNIFICANT CHANGE UP (ref 13–44)
MAGNESIUM SERPL-MCNC: 2.2 MG/DL — SIGNIFICANT CHANGE UP (ref 1.6–2.6)
MCHC RBC-ENTMCNC: 31.6 GM/DL — LOW (ref 32–36)
MCHC RBC-ENTMCNC: 32.5 PG — SIGNIFICANT CHANGE UP (ref 27–34)
MCV RBC AUTO: 102.8 FL — HIGH (ref 80–100)
MONOCYTES # BLD AUTO: 0.45 K/UL — SIGNIFICANT CHANGE UP (ref 0–0.9)
MONOCYTES NFR BLD AUTO: 10.4 % — SIGNIFICANT CHANGE UP (ref 2–14)
NEUTROPHILS # BLD AUTO: 2.68 K/UL — SIGNIFICANT CHANGE UP (ref 1.8–7.4)
NEUTROPHILS NFR BLD AUTO: 61.7 % — SIGNIFICANT CHANGE UP (ref 43–77)
PHOSPHATE SERPL-MCNC: 2.9 MG/DL — SIGNIFICANT CHANGE UP (ref 2.4–4.7)
PLATELET # BLD AUTO: 111 K/UL — LOW (ref 150–400)
POTASSIUM SERPL-MCNC: 4 MMOL/L — SIGNIFICANT CHANGE UP (ref 3.5–5.3)
POTASSIUM SERPL-SCNC: 4 MMOL/L — SIGNIFICANT CHANGE UP (ref 3.5–5.3)
RBC # BLD: 2.89 M/UL — LOW (ref 4.2–5.8)
RBC # FLD: 13.4 % — SIGNIFICANT CHANGE UP (ref 10.3–14.5)
SODIUM SERPL-SCNC: 140 MMOL/L — SIGNIFICANT CHANGE UP (ref 135–145)
WBC # BLD: 4.34 K/UL — SIGNIFICANT CHANGE UP (ref 3.8–10.5)
WBC # FLD AUTO: 4.34 K/UL — SIGNIFICANT CHANGE UP (ref 3.8–10.5)

## 2022-06-04 PROCEDURE — 72141 MRI NECK SPINE W/O DYE: CPT | Mod: 26

## 2022-06-04 PROCEDURE — 99232 SBSQ HOSP IP/OBS MODERATE 35: CPT

## 2022-06-04 RX ORDER — FOLIC ACID 0.8 MG
1 TABLET ORAL DAILY
Refills: 0 | Status: DISCONTINUED | OUTPATIENT
Start: 2022-06-04 | End: 2022-06-06

## 2022-06-04 RX ORDER — THIAMINE MONONITRATE (VIT B1) 100 MG
100 TABLET ORAL DAILY
Refills: 0 | Status: DISCONTINUED | OUTPATIENT
Start: 2022-06-04 | End: 2022-06-06

## 2022-06-04 RX ADMIN — Medication 105 MILLIGRAM(S): at 06:06

## 2022-06-04 RX ADMIN — CHLORHEXIDINE GLUCONATE 1 APPLICATION(S): 213 SOLUTION TOPICAL at 11:35

## 2022-06-04 RX ADMIN — Medication 1 MILLIGRAM(S): at 12:31

## 2022-06-04 RX ADMIN — LIDOCAINE 1 PATCH: 4 CREAM TOPICAL at 00:34

## 2022-06-04 RX ADMIN — Medication 50 MILLIGRAM(S): at 06:06

## 2022-06-04 RX ADMIN — HYDROMORPHONE HYDROCHLORIDE 0.5 MILLIGRAM(S): 2 INJECTION INTRAMUSCULAR; INTRAVENOUS; SUBCUTANEOUS at 02:00

## 2022-06-04 RX ADMIN — HYDROMORPHONE HYDROCHLORIDE 0.5 MILLIGRAM(S): 2 INJECTION INTRAMUSCULAR; INTRAVENOUS; SUBCUTANEOUS at 06:53

## 2022-06-04 RX ADMIN — LIDOCAINE 1 PATCH: 4 CREAM TOPICAL at 11:34

## 2022-06-04 RX ADMIN — HYDROMORPHONE HYDROCHLORIDE 0.5 MILLIGRAM(S): 2 INJECTION INTRAMUSCULAR; INTRAVENOUS; SUBCUTANEOUS at 01:27

## 2022-06-04 RX ADMIN — Medication 15 MILLIGRAM(S): at 05:00

## 2022-06-04 RX ADMIN — GABAPENTIN 100 MILLIGRAM(S): 400 CAPSULE ORAL at 06:06

## 2022-06-04 RX ADMIN — HYDROMORPHONE HYDROCHLORIDE 0.5 MILLIGRAM(S): 2 INJECTION INTRAMUSCULAR; INTRAVENOUS; SUBCUTANEOUS at 06:14

## 2022-06-04 RX ADMIN — Medication 15 MILLIGRAM(S): at 04:08

## 2022-06-04 RX ADMIN — Medication 50 MILLIGRAM(S): at 21:16

## 2022-06-04 RX ADMIN — GABAPENTIN 100 MILLIGRAM(S): 400 CAPSULE ORAL at 14:22

## 2022-06-04 RX ADMIN — Medication 5 MILLIGRAM(S): at 21:16

## 2022-06-04 RX ADMIN — Medication 50 MILLIGRAM(S): at 14:21

## 2022-06-04 RX ADMIN — Medication 1 MILLIGRAM(S): at 23:05

## 2022-06-04 RX ADMIN — GABAPENTIN 100 MILLIGRAM(S): 400 CAPSULE ORAL at 21:15

## 2022-06-04 RX ADMIN — Medication 2 MILLIGRAM(S): at 23:58

## 2022-06-04 RX ADMIN — Medication 1 MILLIGRAM(S): at 23:36

## 2022-06-04 RX ADMIN — Medication 100 MILLIGRAM(S): at 14:21

## 2022-06-04 RX ADMIN — OXYCODONE HYDROCHLORIDE 5 MILLIGRAM(S): 5 TABLET ORAL at 20:30

## 2022-06-04 RX ADMIN — OXYCODONE HYDROCHLORIDE 5 MILLIGRAM(S): 5 TABLET ORAL at 19:39

## 2022-06-04 NOTE — PROGRESS NOTE ADULT - SUBJECTIVE AND OBJECTIVE BOX
65 yo M s/p fall from about 15 stairs while intoxicated, trauma B activation. Reports pain in his R wrist where there is an active bleeding laceration. No other complaints at this time. Confused, GCS 14, hemodynamically stable. Denies taking any blood thinners at home. Denies chest pain, SOB, dyspnea, abdominal pain, N/V, dizziness.      (2022 21:17)      INTERVAL HPI/OVERNIGHT EVENTS:  No issues overnight     Vital Signs Last 24 Hrs  T(C): 36.6 (2022 07:35), Max: 36.9 (2022 11:37)  T(F): 97.9 (2022 07:35), Max: 98.4 (2022 11:37)  HR: 79 (2022 08:00) (73 - 96)  BP: 138/95 (2022 08:00) (112/77 - 168/94)  BP(mean): 111 (2022 08:00) (89 - 116)  RR: 13 (2022 08:00) (9 - 22)  SpO2: 96% (2022 08:00) (91% - 100%)    PHYSICAL EXAM:  Awake, alert and oriented   SCHUMACHER x4 with 5/5 strength   following commands briskly     LABS:                        9.4    4.34  )-----------( 111      ( 2022 04:43 )             29.7     06-04    140  |  102  |  11.9  ----------------------------<  93  4.0   |  25.0  |  0.54    Ca    8.3<L>      2022 04:43  Phos  2.9     06-04  Mg     2.2     06-04    TPro  7.6  /  Alb  4.2  /  TBili  0.3<L>  /  DBili  x   /  AST  101<H>  /  ALT  47<H>  /  AlkPhos  106  06-02    PT/INR - ( 2022 21:24 )   PT: 11.9 sec;   INR: 1.03 ratio         PTT - ( 2022 21:24 )  PTT:34.3 sec  Urinalysis Basic - ( 2022 08:59 )    Color: Yellow / Appearance: Clear / S.015 / pH: x  Gluc: x / Ketone: Negative  / Bili: Negative / Urobili: Negative mg/dL   Blood: x / Protein: Negative / Nitrite: Negative   Leuk Esterase: Negative / RBC: x / WBC x   Sq Epi: x / Non Sq Epi: x / Bacteria: x         @ 07:01   @ 07:00  --------------------------------------------------------  IN: 1900 mL / OUT: 1080 mL / NET: 820 mL     @ 07: @ 10:09  --------------------------------------------------------  IN: 75 mL / OUT: 0 mL / NET: 75 mL        RADIOLOGY & ADDITIONAL TESTS:          CAPRINI SCORE [CLOT]:  Patient has an estimated Caprini score of greater than 5.  However, the patient's unique clinical situation will be addressed in an individual manner to determine appropriate anticoagulation treatment, if any. 67 yo M s/p fall from about 15 stairs while intoxicated, trauma B activation. Reports pain in his R wrist where there is an active bleeding laceration. No other complaints at this time. Confused, GCS 14, hemodynamically stable. Denies taking any blood thinners at home. Denies chest pain, SOB, dyspnea, abdominal pain, N/V, dizziness.      (2022 21:17)      INTERVAL HPI/OVERNIGHT EVENTS:  No issues overnight     Vital Signs Last 24 Hrs  T(C): 36.6 (2022 07:35), Max: 36.9 (2022 11:37)  T(F): 97.9 (2022 07:35), Max: 98.4 (2022 11:37)  HR: 79 (2022 08:00) (73 - 96)  BP: 138/95 (2022 08:00) (112/77 - 168/94)  BP(mean): 111 (2022 08:00) (89 - 116)  RR: 13 (2022 08:00) (9 - 22)  SpO2: 96% (2022 08:00) (91% - 100%)    PHYSICAL EXAM:  Awake, alert and oriented   SCHUMACHER x4 with 5/5 strength   following commands briskly     LABS:                        9.4    4.34  )-----------( 111      ( 2022 04:43 )             29.7     06-04    140  |  102  |  11.9  ----------------------------<  93  4.0   |  25.0  |  0.54    Ca    8.3<L>      2022 04:43  Phos  2.9     06-04  Mg     2.2     06-04    TPro  7.6  /  Alb  4.2  /  TBili  0.3<L>  /  DBili  x   /  AST  101<H>  /  ALT  47<H>  /  AlkPhos  106  06-02    PT/INR - ( 2022 21:24 )   PT: 11.9 sec;   INR: 1.03 ratio         PTT - ( 2022 21:24 )  PTT:34.3 sec  Urinalysis Basic - ( 2022 08:59 )    Color: Yellow / Appearance: Clear / S.015 / pH: x  Gluc: x / Ketone: Negative  / Bili: Negative / Urobili: Negative mg/dL   Blood: x / Protein: Negative / Nitrite: Negative   Leuk Esterase: Negative / RBC: x / WBC x   Sq Epi: x / Non Sq Epi: x / Bacteria: x         @ 07:01   @ 07:00  --------------------------------------------------------  IN: 1900 mL / OUT: 1080 mL / NET: 820 mL     @ 07: @ 10:09  --------------------------------------------------------  IN: 75 mL / OUT: 0 mL / NET: 75 mL          CAPRINI SCORE [CLOT]:  Patient has an estimated Caprini score of greater than 5.  However, the patient's unique clinical situation will be addressed in an individual manner to determine appropriate anticoagulation treatment, if any.

## 2022-06-04 NOTE — PROGRESS NOTE ADULT - ASSESSMENT
Ap: 66yM s/p fall down stairs while intoxicated, patient has C1 L lateral arch fx and rib fx 6-8 with occult ptx    Neuro: Neuro checks, CT angio negative for BCVI. MRI as per nsurg, cervical collar at all times. Adequate pain regimen. CIWA for withdrawal continue librium and ativan prn.      Card: Continue hemodynamic monitoring. Lactate cleared.     Pulm: PIC protocol for rib fx.     GI: NPO for MRI. Bowel regimen. Advance diet after MRI     : Strict i/o's, UOP adequate. Thiamine and folate for alcoholism     Endo: No issues     ID: No issues    Skin: Bacitracin to skin laceration    Ext: Negative for fx    Dispo: Downgrade level of care

## 2022-06-04 NOTE — PROGRESS NOTE ADULT - ASSESSMENT
55 y/o male s/p fall with C1 fx   - c-collar at all times   - CTA/MRI reviewed   - no icu needs from neurosurgery standpoint    55 y/o male s/p fall with C1 fx   - c-collar at all times   - CTA/MRI reviewed   - no icu needs from neurosurgery standpoint   - needs MRI cspine repeated with thin cuts through the skull base (not done on 1st study)

## 2022-06-04 NOTE — PROGRESS NOTE ADULT - SUBJECTIVE AND OBJECTIVE BOX
24 HOUR EVENTS: Patient remains neuro intact. Yesterday elevated CIWA score requiring 1x dose of ativan and librium.  Overnight CIWA scores low. Nausea and vomiting improved. Patient states pain is controlled however, reports that he is sore/ overall body soreness. CT angio negative for BCVI. MRI pending. Patient is voiding without issues. PIC scores 9. No issues.     SUBJECTIVE/ROS:  [x ] A ten-point review of systems was otherwise negative except as noted.  [ ] Due to altered mental status/intubation, subjective information were not able to be obtained from the patient. History was obtained, to the extent possible, from review of the chart and collateral sources of information.      NEURO  RASS:  0   GCS: 15    [facility] ICU:  Exam: Cervical collar in place, no focal neuro deficits, motor and sensation intact   Meds: acetaminophen     Tablet .. 975 milliGRAM(s) Oral every 6 hours PRN Mild Pain (1 - 3)  chlordiazePOXIDE 50 milliGRAM(s) Oral every 8 hours  gabapentin 100 milliGRAM(s) Oral every 8 hours  HYDROmorphone  Injectable 0.5 milliGRAM(s) IV Push every 4 hours PRN Breakthrough pain  ketorolac   Injectable 15 milliGRAM(s) IV Push every 6 hours PRN Moderate Pain (4 - 6)  melatonin 5 milliGRAM(s) Oral at bedtime  ondansetron Injectable 4 milliGRAM(s) IV Push every 6 hours PRN Nausea and/or Vomiting  oxyCODONE    IR 5 milliGRAM(s) Oral every 6 hours PRN Severe Pain (7 - 10)    [x] Adequacy of sedation and pain control has been assessed and adjusted      RESPIRATORY  RR: 13 (06-04-22 @ 01:00) (11 - 22)  SpO2: 93% (06-04-22 @ 01:00) (93% - 100%)  Wt(kg): --  Exam: unlabored, clear to auscultation bilaterally  Mechanical Ventilation:   ABG - ( 03 Jun 2022 14:53 )  pH: x     /  pCO2: x     /  pO2: x     / HCO3: x     / Base Excess: x     /  SaO2: x       Lactate: 0.9              [ ] Extubation Readiness Assessed  Meds:       CARDIOVASCULAR  HR: 78 (06-04-22 @ 01:00) (75 - 97)  BP: 130/78 (06-04-22 @ 01:00) (113/67 - 186/88)  BP(mean): 95 (06-04-22 @ 01:00) (76 - 117)  ABP: --  ABP(mean): --  Wt(kg): --  CVP(cm H2O): --    Lactate, Blood: 0.9 mmol/L (06-03 @ 14:53)    Exam: nsr  Cardiac Rhythm: nsr  Perfusion     [x ]Adequate   [ ]Inadequate  Mentation   [x ]Normal       [ ]Reduced  Extremities  [x ]Warm         [ ]Cool  Volume Status [ ]Hypervolemic [x ]Euvolemic [ ]Hypovolemic  Meds:       GI/NUTRITION  Exam: soft, nttp, nd  Diet: NPO   Meds:     GENITOURINARY  I&O's Detail    06-02 @ 07:01  -  06-03 @ 07:00  --------------------------------------------------------  IN:    multiple electrolytes Injection Type 1.: 525 mL  Total IN: 525 mL    OUT:    Voided (mL): 150 mL  Total OUT: 150 mL    Total NET: 375 mL      06-03 @ 07:01  -  06-04 @ 03:08  --------------------------------------------------------  IN:    IV PiggyBack: 250 mL    multiple electrolytes Injection Type 1.: 1275 mL  Total IN: 1525 mL    OUT:    Voided (mL): 980 mL  Total OUT: 980 mL    Total NET: 545 mL          06-03    142  |  102  |  11.3  ----------------------------<  114<H>  3.9   |  22.0  |  0.68    Ca    8.5<L>      03 Jun 2022 03:56  Phos  4.0     06-03  Mg     1.6     06-03    TPro  7.6  /  Alb  4.2  /  TBili  0.3<L>  /  DBili  x   /  AST  101<H>  /  ALT  47<H>  /  AlkPhos  106  06-02    [ ] [place] catheter, indication: N/A  Meds: folic acid Injectable 1 milliGRAM(s) IV Push daily  multiple electrolytes Injection Type 1 1000 milliLiter(s) IV Continuous <Continuous>  thiamine IVPB 500 milliGRAM(s) IV Intermittent every 8 hours    Ext: 2+ peripheral pulses    Skin: multiple skin lacerations- R ear adequate skin approximation, R wrist, R eyebrow     HEMATOLOGIC  Meds:   [x] VTE Prophylaxis                        10.3   6.06  )-----------( 126      ( 03 Jun 2022 03:56 )             30.2     PT/INR - ( 02 Jun 2022 21:24 )   PT: 11.9 sec;   INR: 1.03 ratio         PTT - ( 02 Jun 2022 21:24 )  PTT:34.3 sec  Transfusion     [ ] PRBC   [ ] Platelets   [ ] FFP   [ ] Cryoprecipitate      INFECTIOUS DISEASES  T(C): 36.8 (06-04-22 @ 00:03), Max: 37 (06-03-22 @ 07:34)  Wt(kg): --  WBC Count: 6.06 K/uL (06-03 @ 03:56)    Recent Cultures:    Meds:       ENDOCRINE  Capillary Blood Glucose    Meds:       ACCESS DEVICES:  [ ] Peripheral IV  [ ] Central Venous Line	[ ] R	[ ] L	[ ] IJ	[ ] Fem	[ ] SC	Placed:   [ ] Arterial Line		[ ] R	[ ] L	[ ] Fem	[ ] Rad	[ ] Ax	Placed:   [ ] PICC:					[ ] Mediport  [ ] Urinary Catheter, Date Placed:   [ ] Necessity of urinary, arterial, and venous catheters discussed    OTHER MEDICATIONS:  chlorhexidine 2% Cloths 1 Application(s) Topical daily  lidocaine   4% Patch 1 Patch Transdermal daily      CODE STATUS:     IMAGING:

## 2022-06-05 LAB
ANION GAP SERPL CALC-SCNC: 12 MMOL/L — SIGNIFICANT CHANGE UP (ref 5–17)
BUN SERPL-MCNC: 10.1 MG/DL — SIGNIFICANT CHANGE UP (ref 8–20)
CALCIUM SERPL-MCNC: 7.7 MG/DL — LOW (ref 8.6–10.2)
CHLORIDE SERPL-SCNC: 104 MMOL/L — SIGNIFICANT CHANGE UP (ref 98–107)
CO2 SERPL-SCNC: 21 MMOL/L — LOW (ref 22–29)
CREAT SERPL-MCNC: 0.48 MG/DL — LOW (ref 0.5–1.3)
DRUG SCREEN, SERUM: SIGNIFICANT CHANGE UP
EGFR: 114 ML/MIN/1.73M2 — SIGNIFICANT CHANGE UP
GLUCOSE SERPL-MCNC: 129 MG/DL — HIGH (ref 70–99)
HCT VFR BLD CALC: 25.9 % — LOW (ref 39–50)
HGB BLD-MCNC: 8.3 G/DL — LOW (ref 13–17)
MAGNESIUM SERPL-MCNC: 1.6 MG/DL — SIGNIFICANT CHANGE UP (ref 1.6–2.6)
MCHC RBC-ENTMCNC: 32 GM/DL — SIGNIFICANT CHANGE UP (ref 32–36)
MCHC RBC-ENTMCNC: 33.6 PG — SIGNIFICANT CHANGE UP (ref 27–34)
MCV RBC AUTO: 104.9 FL — HIGH (ref 80–100)
PHOSPHATE SERPL-MCNC: 2.5 MG/DL — SIGNIFICANT CHANGE UP (ref 2.4–4.7)
PLATELET # BLD AUTO: 103 K/UL — LOW (ref 150–400)
POTASSIUM SERPL-MCNC: 3.5 MMOL/L — SIGNIFICANT CHANGE UP (ref 3.5–5.3)
POTASSIUM SERPL-SCNC: 3.5 MMOL/L — SIGNIFICANT CHANGE UP (ref 3.5–5.3)
RBC # BLD: 2.47 M/UL — LOW (ref 4.2–5.8)
RBC # FLD: 12.9 % — SIGNIFICANT CHANGE UP (ref 10.3–14.5)
SODIUM SERPL-SCNC: 137 MMOL/L — SIGNIFICANT CHANGE UP (ref 135–145)
WBC # BLD: 4.44 K/UL — SIGNIFICANT CHANGE UP (ref 3.8–10.5)
WBC # FLD AUTO: 4.44 K/UL — SIGNIFICANT CHANGE UP (ref 3.8–10.5)

## 2022-06-05 PROCEDURE — 99231 SBSQ HOSP IP/OBS SF/LOW 25: CPT

## 2022-06-05 PROCEDURE — 99232 SBSQ HOSP IP/OBS MODERATE 35: CPT

## 2022-06-05 RX ORDER — POTASSIUM CHLORIDE 20 MEQ
10 PACKET (EA) ORAL
Refills: 0 | Status: COMPLETED | OUTPATIENT
Start: 2022-06-05 | End: 2022-06-05

## 2022-06-05 RX ORDER — DIAZEPAM 5 MG
5 TABLET ORAL ONCE
Refills: 0 | Status: DISCONTINUED | OUTPATIENT
Start: 2022-06-05 | End: 2022-06-05

## 2022-06-05 RX ORDER — CALCIUM GLUCONATE 100 MG/ML
1 VIAL (ML) INTRAVENOUS ONCE
Refills: 0 | Status: COMPLETED | OUTPATIENT
Start: 2022-06-05 | End: 2022-06-05

## 2022-06-05 RX ORDER — POTASSIUM PHOSPHATE, MONOBASIC POTASSIUM PHOSPHATE, DIBASIC 236; 224 MG/ML; MG/ML
30 INJECTION, SOLUTION INTRAVENOUS ONCE
Refills: 0 | Status: COMPLETED | OUTPATIENT
Start: 2022-06-05 | End: 2022-06-05

## 2022-06-05 RX ORDER — DEXMEDETOMIDINE HYDROCHLORIDE IN 0.9% SODIUM CHLORIDE 4 UG/ML
0.5 INJECTION INTRAVENOUS
Qty: 200 | Refills: 0 | Status: DISCONTINUED | OUTPATIENT
Start: 2022-06-05 | End: 2022-06-07

## 2022-06-05 RX ORDER — MAGNESIUM SULFATE 500 MG/ML
2 VIAL (ML) INJECTION ONCE
Refills: 0 | Status: COMPLETED | OUTPATIENT
Start: 2022-06-05 | End: 2022-06-05

## 2022-06-05 RX ORDER — DIAZEPAM 5 MG
5 TABLET ORAL EVERY 8 HOURS
Refills: 0 | Status: DISCONTINUED | OUTPATIENT
Start: 2022-06-05 | End: 2022-06-06

## 2022-06-05 RX ORDER — DIAZEPAM 5 MG
5 TABLET ORAL EVERY 8 HOURS
Refills: 0 | Status: DISCONTINUED | OUTPATIENT
Start: 2022-06-05 | End: 2022-06-05

## 2022-06-05 RX ORDER — HALOPERIDOL DECANOATE 100 MG/ML
5 INJECTION INTRAMUSCULAR ONCE
Refills: 0 | Status: COMPLETED | OUTPATIENT
Start: 2022-06-05 | End: 2022-06-05

## 2022-06-05 RX ORDER — DIAZEPAM 5 MG
10 TABLET ORAL EVERY 8 HOURS
Refills: 0 | Status: DISCONTINUED | OUTPATIENT
Start: 2022-06-05 | End: 2022-06-05

## 2022-06-05 RX ORDER — ENOXAPARIN SODIUM 100 MG/ML
40 INJECTION SUBCUTANEOUS EVERY 24 HOURS
Refills: 0 | Status: DISCONTINUED | OUTPATIENT
Start: 2022-06-05 | End: 2022-06-15

## 2022-06-05 RX ADMIN — LIDOCAINE 1 PATCH: 4 CREAM TOPICAL at 11:49

## 2022-06-05 RX ADMIN — HALOPERIDOL DECANOATE 5 MILLIGRAM(S): 100 INJECTION INTRAMUSCULAR at 11:33

## 2022-06-05 RX ADMIN — Medication 10 MILLIGRAM(S): at 16:25

## 2022-06-05 RX ADMIN — Medication 5 MILLIGRAM(S): at 08:49

## 2022-06-05 RX ADMIN — Medication 100 GRAM(S): at 06:35

## 2022-06-05 RX ADMIN — POTASSIUM PHOSPHATE, MONOBASIC POTASSIUM PHOSPHATE, DIBASIC 83.33 MILLIMOLE(S): 236; 224 INJECTION, SOLUTION INTRAVENOUS at 17:55

## 2022-06-05 RX ADMIN — Medication 5 MILLIGRAM(S): at 21:29

## 2022-06-05 RX ADMIN — Medication 2 MILLIGRAM(S): at 11:33

## 2022-06-05 RX ADMIN — Medication 100 MILLIEQUIVALENT(S): at 07:14

## 2022-06-05 RX ADMIN — Medication 5 MILLIGRAM(S): at 10:15

## 2022-06-05 RX ADMIN — DEXMEDETOMIDINE HYDROCHLORIDE IN 0.9% SODIUM CHLORIDE 9.36 MICROGRAM(S)/KG/HR: 4 INJECTION INTRAVENOUS at 10:16

## 2022-06-05 RX ADMIN — Medication 100 MILLIEQUIVALENT(S): at 10:50

## 2022-06-05 RX ADMIN — LIDOCAINE 1 PATCH: 4 CREAM TOPICAL at 19:30

## 2022-06-05 RX ADMIN — CHLORHEXIDINE GLUCONATE 1 APPLICATION(S): 213 SOLUTION TOPICAL at 11:50

## 2022-06-05 RX ADMIN — DEXMEDETOMIDINE HYDROCHLORIDE IN 0.9% SODIUM CHLORIDE 9.36 MICROGRAM(S)/KG/HR: 4 INJECTION INTRAVENOUS at 00:09

## 2022-06-05 RX ADMIN — LIDOCAINE 1 PATCH: 4 CREAM TOPICAL at 23:49

## 2022-06-05 RX ADMIN — ENOXAPARIN SODIUM 40 MILLIGRAM(S): 100 INJECTION SUBCUTANEOUS at 09:32

## 2022-06-05 RX ADMIN — DEXMEDETOMIDINE HYDROCHLORIDE IN 0.9% SODIUM CHLORIDE 9.36 MICROGRAM(S)/KG/HR: 4 INJECTION INTRAVENOUS at 11:32

## 2022-06-05 RX ADMIN — Medication 100 MILLIEQUIVALENT(S): at 09:31

## 2022-06-05 RX ADMIN — Medication 25 GRAM(S): at 05:58

## 2022-06-05 RX ADMIN — DEXMEDETOMIDINE HYDROCHLORIDE IN 0.9% SODIUM CHLORIDE 9.36 MICROGRAM(S)/KG/HR: 4 INJECTION INTRAVENOUS at 14:33

## 2022-06-05 RX ADMIN — DEXMEDETOMIDINE HYDROCHLORIDE IN 0.9% SODIUM CHLORIDE 9.36 MICROGRAM(S)/KG/HR: 4 INJECTION INTRAVENOUS at 22:25

## 2022-06-05 NOTE — DIETITIAN INITIAL EVALUATION ADULT - ENTER TO (CAL/KG)
----- Message from Samira Pope NP sent at 4/17/2022  9:10 AM CDT -----  Please inform patient that urine culture did not grow any bacteria, therefore there is no indication of bladder infection.   If symptoms persist, f/u with PCP or return to clinic for evaluation.    
Unable to contact pt's parents.  Mailed pt contact letter.  
30

## 2022-06-05 NOTE — PROGRESS NOTE ADULT - ASSESSMENT
66yM s/p fall down stairs while intoxicated, patient has C1 L lateral arch fx and rib fx 6-8 with occult ptx, alcohol withdrawal     Neuro: Neuro checks, CT angio negative for BCVI. MRI as per nsurg, cervical collar at all times. Adequate pain regimen. CIWA for withdrawal continue librium and ativan prn., precedex wean as tolerated. Tylenol, oxy, dilaudid PRN for pain, gabapentin 100q8     Card: Continue hemodynamic monitoring    Pulm: PIC protocol for rib fx.     GI: Regular diet if able     : Strict i/o's, UOP adequate. Thiamine and folate for alcoholism     Endo: No issues     ID: No issues    Skin: Bacitracin to skin laceration    Ext: Negative for fx    Dispo: SICU for withdrawal

## 2022-06-05 NOTE — PROGRESS NOTE ADULT - SUBJECTIVE AND OBJECTIVE BOX
HPI: 67 yo M s/p fall from about 15 stairs while intoxicated, trauma B activation. Reports pain in his R wrist where there is an active bleeding laceration. No other complaints at this time. Confused, GCS 14, hemodynamically stable. Denies taking any blood thinners at home. Denies chest pain, SOB, dyspnea, abdominal pain, N/V, dizziness.     Interval history: Patient was admitted to SICU for close observation. Patient required precedex gtt for alcohol withdrawal. Pt is awaiting MRI of the c-spine with thin cuts through skull base    Physical Exam:  Constitutional: NAD    Neuro  * Mental Status:  sedeted on precedex  * Cranial Nerves: PERRL  * Motor: SCHUMACHER  * Sensory: Sensation to pain  * Reflexes: Not assessed  * Gait: Not assessed    Vital Signs Last 24 Hrs  T(C): 37.3 (05 Jun 2022 03:48), Max: 37.3 (05 Jun 2022 03:48)  T(F): 99.2 (05 Jun 2022 03:48), Max: 99.2 (05 Jun 2022 03:48)  HR: 78 (05 Jun 2022 09:00) (69 - 122)  BP: 173/86 (05 Jun 2022 09:00) (118/70 - 182/129)  BP(mean): 109 (05 Jun 2022 09:00) (83 - 141)  RR: 14 (05 Jun 2022 09:00) (9 - 22)  SpO2: 99% (05 Jun 2022 09:00) (91% - 100%)    Labs & Radiology:                        8.3    4.44  )-----------( 103      ( 05 Jun 2022 04:40 )             25.9     137  |  104  |  10.1  ----------------------------<  129<H>  3.5   |  21.0<L>  |  0.48<L>    Ca    7.7<L>      05 Jun 2022 04:40  Phos  2.5     06-05  Mg     1.6     06-05    Neurosurgery Imaging:   MR Cervical Spine No Cont (06.04.22 @ 09:53) >  IMPRESSION: Nondisplaced fracture at the left anterior arch of C1 and   left body of C1 identified. Prevertebral edema at the C1 level extending  down to the C4-5 level. No definite disruption of the anterior posterior   longitudinal ligaments identified. No evidence of posterior ligamentous   disruption at the level of the ligamentum flavum or interspinous ligaments    Medications:  MEDICATIONS  (STANDING):  chlorhexidine 2% Cloths 1 Application(s) Topical daily  dexMEDEtomidine Infusion 0.5 MICROgram(s)/kG/Hr (9.36 mL/Hr) IV Continuous <Continuous>  diazepam  Injectable 5 milliGRAM(s) IV Push every 8 hours  enoxaparin Injectable 40 milliGRAM(s) SubCutaneous every 24 hours  folic acid 1 milliGRAM(s) Oral daily  gabapentin 100 milliGRAM(s) Oral every 8 hours  lidocaine   4% Patch 1 Patch Transdermal daily  melatonin 5 milliGRAM(s) Oral at bedtime  potassium chloride  10 mEq/100 mL IVPB 10 milliEquivalent(s) IV Intermittent every 1 hour  potassium phosphate IVPB 30 milliMole(s) IV Intermittent once  thiamine 100 milliGRAM(s) Oral daily    MEDICATIONS  (PRN):  acetaminophen     Tablet .. 975 milliGRAM(s) Oral every 6 hours PRN Mild Pain (1 - 3)  HYDROmorphone  Injectable 0.5 milliGRAM(s) IV Push every 4 hours PRN Breakthrough pain  ondansetron Injectable 4 milliGRAM(s) IV Push every 6 hours PRN Nausea and/or Vomiting  oxyCODONE    IR 5 milliGRAM(s) Oral every 6 hours PRN Severe Pain (7 - 10)

## 2022-06-05 NOTE — DIETITIAN INITIAL EVALUATION ADULT - REASON FOR ADMISSION
Unspecified displaced fracture of first cervical vertebra, initial encounter for closed fracture

## 2022-06-05 NOTE — PROGRESS NOTE ADULT - SUBJECTIVE AND OBJECTIVE BOX
24 HOUR EVENTS: Overnight patient in active withdrawal, + hallucinations, agitation, hypertension and tachycardia. s/p 4mg ativan without relief, started on precedex. pt already on oral librium 50q8. Denied any pain earlier in night, able to IS 2000     Vital Signs Last 24 Hrs  T(C): 37.2 (05 Jun 2022 00:01), Max: 37.2 (05 Jun 2022 00:01)  T(F): 99 (05 Jun 2022 00:01), Max: 99 (05 Jun 2022 00:01)  HR: 87 (05 Jun 2022 02:00) (73 - 122)  BP: 181/107 (05 Jun 2022 02:00) (112/77 - 182/129)  BP(mean): 127 (05 Jun 2022 02:00) (89 - 141)  RR: 17 (05 Jun 2022 02:00) (9 - 22)  SpO2: 100% (05 Jun 2022 02:00) (91% - 100%)    I&O's Summary    03 Jun 2022 07:01  -  04 Jun 2022 07:00  --------------------------------------------------------  IN: 1900 mL / OUT: 1080 mL / NET: 820 mL    04 Jun 2022 07:01  -  05 Jun 2022 03:01  --------------------------------------------------------  IN: 577.4 mL / OUT: 550 mL / NET: 27.4 mL    Physical Exam:     Constitutional: agitated/angious  	Neuro  	* Mental Status:  GCS 14, confused, moves all extremities antigravity   	Cardiovascular:  S1, S2 Regular rate and rhythm.  	Respiratory: Clear to auscultation.  	Gastrointestinal: Soft, nontender, nondistended.  	Genitourinary: male   	Musculoskeletal: No muscle wasting noted, (See neuorlogic assessment for full muscle strength assessment) No pretibial edema appreciated, no appreciable calf tenderness.  	Skin:  no skin breakdown  	Musculoskeletal: See detailed muscle strength examination, listed under neurologic examination.  Hematologic / Lymph / Immunologic: No bleeding from IV sites or wounds, No lymphadenopathy, No HIves or allergic type skin lesions                          9.4    4.34  )-----------( 111      ( 04 Jun 2022 04:43 )             29.7   06-04    140  |  102  |  11.9  ----------------------------<  93  4.0   |  25.0  |  0.54    Ca    8.3<L>      04 Jun 2022 04:43  Phos  2.9     06-04  Mg     2.2     06-04

## 2022-06-05 NOTE — PROGRESS NOTE ADULT - ASSESSMENT
Assessment:  67 yo M s/p fall from about 15 stairs while intoxicated, trauma B activation. Reports pain in his R wrist where there is an active bleeding laceration. No other complaints at this time. Confused, GCS 14, hemodynamically stable. Denies taking any blood thinners at home. Denies chest pain, SOB, dyspnea, abdominal pain, N/V, dizziness.     Plan  - c-collar at all times  - pain control  - MRI of the c-spine with thin cuts through skull base  - alcohol withdrawal on precedex  - DVT prophylax w/ Lovenox  - we will continue to follow

## 2022-06-05 NOTE — PROGRESS NOTE ADULT - ATTENDING COMMENTS
66yM s/p fall down stairs while intoxicated, patient has C1 L lateral arch fx and rib fx 6-8 with occult ptx, alcohol withdrawal 66yM s/p fall down stairs while intoxicated, patient has C1 L lateral arch fx and rib fx 6-8 with occult ptx, alcohol withdrawal  Awake but confused in delirium tremens consequent to ETOH withdrawal  Extremely agitated and needs restraints   Precedex drip  On CIWA will employ Valium protocol  May need Seroquel if the above does not work  hemodynamic intact  Bilateral BS  Abdomen soft  Too confused to take PO adequately  Continue care, may add Seroquel to neurobehavioral protocol

## 2022-06-05 NOTE — DIETITIAN INITIAL EVALUATION ADULT - PERTINENT MEDS FT
MEDICATIONS  (STANDING):  chlorhexidine 2% Cloths 1 Application(s) Topical daily  dexMEDEtomidine Infusion 0.5 MICROgram(s)/kG/Hr (9.36 mL/Hr) IV Continuous <Continuous>  diazepam  Injectable 5 milliGRAM(s) IV Push every 8 hours  diazepam  Injectable 5 milliGRAM(s) IV Push once  enoxaparin Injectable 40 milliGRAM(s) SubCutaneous every 24 hours  folic acid 1 milliGRAM(s) Oral daily  gabapentin 100 milliGRAM(s) Oral every 8 hours  lidocaine   4% Patch 1 Patch Transdermal daily  melatonin 5 milliGRAM(s) Oral at bedtime  potassium chloride  10 mEq/100 mL IVPB 10 milliEquivalent(s) IV Intermittent every 1 hour  potassium phosphate IVPB 30 milliMole(s) IV Intermittent once  thiamine 100 milliGRAM(s) Oral daily    MEDICATIONS  (PRN):  acetaminophen     Tablet .. 975 milliGRAM(s) Oral every 6 hours PRN Mild Pain (1 - 3)  HYDROmorphone  Injectable 0.5 milliGRAM(s) IV Push every 4 hours PRN Breakthrough pain  ondansetron Injectable 4 milliGRAM(s) IV Push every 6 hours PRN Nausea and/or Vomiting  oxyCODONE    IR 5 milliGRAM(s) Oral every 6 hours PRN Severe Pain (7 - 10)

## 2022-06-05 NOTE — DIETITIAN INITIAL EVALUATION ADULT - ADD RECOMMEND
Add Ensure Enlive TID to optimize po intake and provide an additional 350kcal, 20g protein per serving  Continue thiamine and folic acid, RX: MVI daily

## 2022-06-05 NOTE — DIETITIAN INITIAL EVALUATION ADULT - ORAL INTAKE PTA/DIET HISTORY
Pt sleeping soundly during assessment. Observed breakfast tray untouched at this time. Diet advanced from NPO to regular yesterday. Aware pt in active withdrawal, + hallucinations and agitation. RD to follow up to obtain hx and provide education as feasible.

## 2022-06-05 NOTE — DIETITIAN INITIAL EVALUATION ADULT - PERTINENT LABORATORY DATA
06-05    137  |  104  |  10.1  ----------------------------<  129<H>  3.5   |  21.0<L>  |  0.48<L>    Ca    7.7<L>      05 Jun 2022 04:40  Phos  2.5     06-05  Mg     1.6     06-05

## 2022-06-06 LAB
ANION GAP SERPL CALC-SCNC: 11 MMOL/L — SIGNIFICANT CHANGE UP (ref 5–17)
BUN SERPL-MCNC: 8.6 MG/DL — SIGNIFICANT CHANGE UP (ref 8–20)
CALCIUM SERPL-MCNC: 8.5 MG/DL — LOW (ref 8.6–10.2)
CHLORIDE SERPL-SCNC: 101 MMOL/L — SIGNIFICANT CHANGE UP (ref 98–107)
CO2 SERPL-SCNC: 26 MMOL/L — SIGNIFICANT CHANGE UP (ref 22–29)
CREAT SERPL-MCNC: 0.53 MG/DL — SIGNIFICANT CHANGE UP (ref 0.5–1.3)
EGFR: 111 ML/MIN/1.73M2 — SIGNIFICANT CHANGE UP
GLUCOSE SERPL-MCNC: 122 MG/DL — HIGH (ref 70–99)
HCT VFR BLD CALC: 28.6 % — LOW (ref 39–50)
HGB BLD-MCNC: 9.5 G/DL — LOW (ref 13–17)
MAGNESIUM SERPL-MCNC: 1.5 MG/DL — LOW (ref 1.6–2.6)
MCHC RBC-ENTMCNC: 32.8 PG — SIGNIFICANT CHANGE UP (ref 27–34)
MCHC RBC-ENTMCNC: 33.2 GM/DL — SIGNIFICANT CHANGE UP (ref 32–36)
MCV RBC AUTO: 98.6 FL — SIGNIFICANT CHANGE UP (ref 80–100)
PHOSPHATE SERPL-MCNC: 4.7 MG/DL — SIGNIFICANT CHANGE UP (ref 2.4–4.7)
PLATELET # BLD AUTO: 106 K/UL — LOW (ref 150–400)
POTASSIUM SERPL-MCNC: 4.1 MMOL/L — SIGNIFICANT CHANGE UP (ref 3.5–5.3)
POTASSIUM SERPL-SCNC: 4.1 MMOL/L — SIGNIFICANT CHANGE UP (ref 3.5–5.3)
RBC # BLD: 2.9 M/UL — LOW (ref 4.2–5.8)
RBC # FLD: 12.3 % — SIGNIFICANT CHANGE UP (ref 10.3–14.5)
SODIUM SERPL-SCNC: 138 MMOL/L — SIGNIFICANT CHANGE UP (ref 135–145)
WBC # BLD: 5.08 K/UL — SIGNIFICANT CHANGE UP (ref 3.8–10.5)
WBC # FLD AUTO: 5.08 K/UL — SIGNIFICANT CHANGE UP (ref 3.8–10.5)

## 2022-06-06 PROCEDURE — 99291 CRITICAL CARE FIRST HOUR: CPT

## 2022-06-06 PROCEDURE — 99232 SBSQ HOSP IP/OBS MODERATE 35: CPT

## 2022-06-06 RX ORDER — DIAZEPAM 5 MG
5 TABLET ORAL EVERY 8 HOURS
Refills: 0 | Status: DISCONTINUED | OUTPATIENT
Start: 2022-06-06 | End: 2022-06-06

## 2022-06-06 RX ORDER — DIAZEPAM 5 MG
5 TABLET ORAL
Refills: 0 | Status: DISCONTINUED | OUTPATIENT
Start: 2022-06-06 | End: 2022-06-07

## 2022-06-06 RX ORDER — ALBUTEROL 90 UG/1
2.5 AEROSOL, METERED ORAL EVERY 6 HOURS
Refills: 0 | Status: DISCONTINUED | OUTPATIENT
Start: 2022-06-06 | End: 2022-06-13

## 2022-06-06 RX ORDER — SODIUM CHLORIDE 9 MG/ML
1000 INJECTION, SOLUTION INTRAVENOUS
Refills: 0 | Status: DISCONTINUED | OUTPATIENT
Start: 2022-06-06 | End: 2022-06-09

## 2022-06-06 RX ORDER — SODIUM CHLORIDE 9 MG/ML
500 INJECTION, SOLUTION INTRAVENOUS ONCE
Refills: 0 | Status: COMPLETED | OUTPATIENT
Start: 2022-06-06 | End: 2022-06-06

## 2022-06-06 RX ORDER — MAGNESIUM SULFATE 500 MG/ML
2 VIAL (ML) INJECTION ONCE
Refills: 0 | Status: COMPLETED | OUTPATIENT
Start: 2022-06-06 | End: 2022-06-06

## 2022-06-06 RX ADMIN — SODIUM CHLORIDE 1000 MILLILITER(S): 9 INJECTION, SOLUTION INTRAVENOUS at 17:27

## 2022-06-06 RX ADMIN — ALBUTEROL 2.5 MILLIGRAM(S): 90 AEROSOL, METERED ORAL at 10:22

## 2022-06-06 RX ADMIN — Medication 5 MILLIGRAM(S): at 17:34

## 2022-06-06 RX ADMIN — ALBUTEROL 2.5 MILLIGRAM(S): 90 AEROSOL, METERED ORAL at 22:17

## 2022-06-06 RX ADMIN — DEXMEDETOMIDINE HYDROCHLORIDE IN 0.9% SODIUM CHLORIDE 9.36 MICROGRAM(S)/KG/HR: 4 INJECTION INTRAVENOUS at 10:13

## 2022-06-06 RX ADMIN — DEXMEDETOMIDINE HYDROCHLORIDE IN 0.9% SODIUM CHLORIDE 9.36 MICROGRAM(S)/KG/HR: 4 INJECTION INTRAVENOUS at 01:37

## 2022-06-06 RX ADMIN — DEXMEDETOMIDINE HYDROCHLORIDE IN 0.9% SODIUM CHLORIDE 9.36 MICROGRAM(S)/KG/HR: 4 INJECTION INTRAVENOUS at 06:59

## 2022-06-06 RX ADMIN — DEXMEDETOMIDINE HYDROCHLORIDE IN 0.9% SODIUM CHLORIDE 9.36 MICROGRAM(S)/KG/HR: 4 INJECTION INTRAVENOUS at 05:15

## 2022-06-06 RX ADMIN — LIDOCAINE 1 PATCH: 4 CREAM TOPICAL at 21:00

## 2022-06-06 RX ADMIN — LIDOCAINE 1 PATCH: 4 CREAM TOPICAL at 23:30

## 2022-06-06 RX ADMIN — Medication 25 GRAM(S): at 06:51

## 2022-06-06 RX ADMIN — CHLORHEXIDINE GLUCONATE 1 APPLICATION(S): 213 SOLUTION TOPICAL at 11:24

## 2022-06-06 RX ADMIN — ENOXAPARIN SODIUM 40 MILLIGRAM(S): 100 INJECTION SUBCUTANEOUS at 11:19

## 2022-06-06 RX ADMIN — LIDOCAINE 1 PATCH: 4 CREAM TOPICAL at 11:20

## 2022-06-06 RX ADMIN — Medication 5 MILLIGRAM(S): at 05:15

## 2022-06-06 RX ADMIN — Medication 2 MILLIGRAM(S): at 09:38

## 2022-06-06 NOTE — PROGRESS NOTE ADULT - CRITICAL CARE ATTENDING COMMENT
Events overnight noted.    GEN: Eyes open, speaks in low/quiet voice, moves all extremities, oriented to place and person but not to situation  NECK: Cervical collar in place  ABD:  Soft, non tender, non distended, left flank hematoma noted  EXT:  Warm, no edema, right wrist splinted    66 year old gentleman w/hx of psoriatic arthritis and etoh abuse who was admitted 6/2/22 after an intoxicated fall down stairs where he sustained a C1 lateral arch fx and L 6-8 rib fractures and occult pneumothorax.  6/5/22 went into withdrawal and started on precedex.  Poor cough w/copious secretions requiring NT suctioning this am.    HD stable  Needs aggressive chest PT and suctioning.  With this 02 sats improve.  Continue throughout the day.  Humidify O2  Will need f/u MRI when medically stable  duotube  SCDs, lovenox  No deep lines  f/u w/family meds for psoriatic arthritis  Mg replaced  Ongoing withdrawal requiring posey -but patient less combative this am and (apparently) more conversant -will decrease IV valium dosing to q12hr  continue in critical care today for aggressive suctioning and redirecting

## 2022-06-06 NOTE — PROGRESS NOTE ADULT - ASSESSMENT
66ym presented after a fall. Neg LOC. Pt presented without any neck pain.   Hx of RA and ETOH abuse    Plan   1. Pt is presently in the SICU for ETOH withdrawal  2. Pt is presently on precedex  3, MRI of the c spine ordered for thin cuts to be obtained thru the skull base.  4. pain control left sided rib fractures 7th and 8th rib  5. dvt prophyslaxis Lovenox

## 2022-06-06 NOTE — PROGRESS NOTE ADULT - SUBJECTIVE AND OBJECTIVE BOX
24h Events: Patient remains on precedex drip and valium for active ETOH withdrawals. Intermittent impulsivity remains. Slept through the night fairly well. Tolerating regular diet. Voiding spontaneously. C-Collar remains in place.     ICU Vital Signs Last 24 Hrs  T(C): 36.8 (06 Jun 2022 03:25), Max: 37.1 (06 Jun 2022 00:22)  T(F): 98.2 (06 Jun 2022 03:25), Max: 98.7 (06 Jun 2022 00:22)  HR: 67 (06 Jun 2022 05:30) (54 - 107)  BP: 116/93 (06 Jun 2022 05:30) (81/62 - 176/104)  BP(mean): 100 (06 Jun 2022 05:30) (71 - 128)  ABP: --  ABP(mean): --  RR: 13 (06 Jun 2022 05:30) (11 - 24)  SpO2: 97% (06 Jun 2022 05:30) (84% - 100%)      I&O's Detail    04 Jun 2022 07:01  -  05 Jun 2022 07:00  --------------------------------------------------------  IN:    Dexmedetomidine: 57.8 mL    IV PiggyBack: 150 mL    multiple electrolytes Injection Type 1.: 75 mL    Oral Fluid: 480 mL  Total IN: 762.8 mL    OUT:    Voided (mL): 950 mL  Total OUT: 950 mL    Total NET: -187.2 mL      05 Jun 2022 07:01  -  06 Jun 2022 06:14  --------------------------------------------------------  IN:    Dexmedetomidine: 353 mL    IV PiggyBack: 416 mL  Total IN: 769 mL    OUT:    Voided (mL): 550 mL  Total OUT: 550 mL    Total NET: 219 mL      MEDICATIONS  (STANDING):  chlorhexidine 2% Cloths 1 Application(s) Topical daily  dexMEDEtomidine Infusion 0.5 MICROgram(s)/kG/Hr (9.36 mL/Hr) IV Continuous <Continuous>  diazepam  Injectable 5 milliGRAM(s) IV Push every 8 hours  enoxaparin Injectable 40 milliGRAM(s) SubCutaneous every 24 hours  folic acid 1 milliGRAM(s) Oral daily  gabapentin 100 milliGRAM(s) Oral every 8 hours  lidocaine   4% Patch 1 Patch Transdermal daily  magnesium sulfate  IVPB 2 Gram(s) IV Intermittent once  melatonin 5 milliGRAM(s) Oral at bedtime  thiamine 100 milliGRAM(s) Oral daily    MEDICATIONS  (PRN):  acetaminophen     Tablet .. 975 milliGRAM(s) Oral every 6 hours PRN Mild Pain (1 - 3)  HYDROmorphone  Injectable 0.5 milliGRAM(s) IV Push every 4 hours PRN Breakthrough pain  ondansetron Injectable 4 milliGRAM(s) IV Push every 6 hours PRN Nausea and/or Vomiting  oxyCODONE    IR 5 milliGRAM(s) Oral every 6 hours PRN Severe Pain (7 - 10)      Physical Exam:    Neurological: GCS 14. Confused, Impulsive, moving all extremities.     Pulmonary: Room air, lung sounds clear to auscultation bilaterally    Cardiovascular: regular rate & rhythm    Gastrointestinal: soft, non-tender, non-distended    : Voiding spontaneously    Skin: warm, dry, no diaphoresis, no pallor, cyanosis, or jaundice    LABS:  CBC Full  -  ( 06 Jun 2022 05:13 )  WBC Count : 5.08 K/uL  RBC Count : 2.90 M/uL  Hemoglobin : 9.5 g/dL  Hematocrit : 28.6 %  Platelet Count - Automated : 106 K/uL  Mean Cell Volume : 98.6 fl  Mean Cell Hemoglobin : 32.8 pg  Mean Cell Hemoglobin Concentration : 33.2 gm/dL  Auto Neutrophil # : x  Auto Lymphocyte # : x  Auto Monocyte # : x  Auto Eosinophil # : x  Auto Basophil # : x  Auto Neutrophil % : x  Auto Lymphocyte % : x  Auto Monocyte % : x  Auto Eosinophil % : x  Auto Basophil % : x    06-06    138  |  101  |  8.6  ----------------------------<  122<H>  4.1   |  26.0  |  0.53    Ca    8.5<L>      06 Jun 2022 05:13  Phos  4.7     06-06  Mg     1.5     06-06          RECENT CULTURES:

## 2022-06-06 NOTE — PROGRESS NOTE ADULT - ASSESSMENT
Assessment: 66yM s/p fall down stairs while intoxicated, patient has C1 L lateral arch fx and rib fx 6-8 with occult ptx, alcohol withdrawal.    Neuro: Neuro checks, CT angio negative for BCVI. MRI as per nsurg, cervical collar at all times. Adequate pain regimen. CIWA for withdrawal continue valium 5 mg q8h. Precedex, wean as tolerated. Will consider adding Seroquel if needed for additional neurobehavioral, Tylenol, oxy, Dilaudid PRN for pain, gabapentin 100q8     Card: Continue hemodynamic monitoring    Pulm: PIC protocol for rib fx. pulm toilet, encourage IS.     GI: Regular diet    : Strict i/o's, UOP adequate. Thiamine and folate for alcoholism     Endo: No issues     ID: No issues    Skin: Bacitracin to skin laceration    Ext: Negative for fx    Dispo: SICU for ETOH withdrawal

## 2022-06-06 NOTE — PROGRESS NOTE ADULT - SUBJECTIVE AND OBJECTIVE BOX
INTERVAL HPI/OVERNIGHT EVENTS:  65 yo M s/p fall from about 15 stairs while intoxicated, trauma B activation. Reports pain in his R wrist where there is an active bleeding laceration. No other complaints at this time. Confused, GCS 14, hemodynamically stable. Denies taking any blood thinners at home. Denies chest pain, SOB, dyspnea, abdominal pain, N/V, dizziness.   Pt being sedation for etoh withdrawal.         MEDICATIONS  (STANDING):  ALBUTerol    0.083% 2.5 milliGRAM(s) Nebulizer every 6 hours  chlorhexidine 2% Cloths 1 Application(s) Topical daily  dexMEDEtomidine Infusion 0.5 MICROgram(s)/kG/Hr (9.36 mL/Hr) IV Continuous <Continuous>  diazepam  Injectable 5 milliGRAM(s) IV Push <User Schedule>  enoxaparin Injectable 40 milliGRAM(s) SubCutaneous every 24 hours  folic acid 1 milliGRAM(s) Oral daily  gabapentin 100 milliGRAM(s) Oral every 8 hours  lidocaine   4% Patch 1 Patch Transdermal daily  melatonin 5 milliGRAM(s) Oral at bedtime  thiamine 100 milliGRAM(s) Oral daily    MEDICATIONS  (PRN):  acetaminophen     Tablet .. 975 milliGRAM(s) Oral every 6 hours PRN Mild Pain (1 - 3)  HYDROmorphone  Injectable 0.5 milliGRAM(s) IV Push every 4 hours PRN Breakthrough pain  ondansetron Injectable 4 milliGRAM(s) IV Push every 6 hours PRN Nausea and/or Vomiting  oxyCODONE    IR 5 milliGRAM(s) Oral every 6 hours PRN Severe Pain (7 - 10)  Allergies  Allergy Status Unknown  No Known Allergies  Intolerances    Vital Signs Last 24 Hrs  T(C): 36.5 (06 Jun 2022 09:00), Max: 37.1 (06 Jun 2022 00:22)  T(F): 97.7 (06 Jun 2022 09:00), Max: 98.7 (06 Jun 2022 00:22)  HR: 81 (06 Jun 2022 09:00) (54 - 107)  BP: 123/79 (06 Jun 2022 09:00) (81/62 - 176/104)  BP(mean): 111 (06 Jun 2022 08:00) (71 - 123)  RR: 20 (06 Jun 2022 09:00) (11 - 24)  SpO2: 91% (06 Jun 2022 09:00) (84% - 100%) BMI (kg/m2): 28.3 (06-03-22 @ 00:40)    PHYSICAL EXAM  GENERAL: NAD, well-groomed, well-developed  HEAD:  Atraumatic, Normocephalic  EYES: EOMI, PERRLA, conjunctiva and sclera clear  ENMT: No tonsillar erythema, exudates, or enlargement; Moist mucous membranes, Good dentition, No lesions  NECK: Supple, No JVD, Normal thyroid  NERVOUS SYSTEM:  Alert & Oriented X3, Good concentration; Motor Strength 5/5 B/L upper and lower extremities; DTRs 2+ intact and symmetric  CHEST/LUNG: Clear to percussion bilaterally; No rales, rhonchi, wheezing, or rubs  HEART: Regular rate and rhythm; No murmurs, rubs, or gallops  ABDOMEN: Soft, Nontender, Nondistended; Bowel sounds present  EXTREMITIES:  2+ Peripheral Pulses, No clubbing, cyanosis, or edema  LYMPH: No lymphadenopathy noted  SKIN: No rashes or lesions      LABS:                          9.5    5.08  )-----------( 106      ( 06 Jun 2022 05:13 )             28.6     06-06    138  |  101  |  8.6  ----------------------------<  122<H>  4.1   |  26.0  |  0.53    Ca    8.5<L>      06 Jun 2022 05:13  Phos  4.7     06-06  Mg     1.5     06-06          I&O's Detail    05 Jun 2022 07:01  -  06 Jun 2022 07:00  --------------------------------------------------------  IN:    Dexmedetomidine: 390.4 mL    IV PiggyBack: 25 mL    IV PiggyBack: 416 mL  Total IN: 831.4 mL    OUT:    Voided (mL): 550 mL  Total OUT: 550 mL    Total NET: 281.4 mL    RADIOLOGY & ADDITIONAL TESTS:  < from: CT Cervical Spine No Cont (05.21.22 @ 23:33) >  ACC: [id number] EXAM:  CT CERVICAL SPINE                        ACC: [id number] EXAM:  CT BRAIN                        PROCEDURE DATE:  05/21/2022    Cervical Spine CT Findings:  Cervical lordosis is maintained.  There is mild anterolisthesis at C3-C4,   C4-C5, C5-C6 and C7-T1. There is multilevel facet arthropathy and   degenerative disc disease, most pronounced at C6-C7.  There is multilevel   foraminal stenosis severe degrees. No high-grade canal stenosis is   identified. There is no acute displaced fracture.  There is no   prevertebral soft tissue swelling.  The vertebral body heights are   maintained.  Impression: No evidence of acute traumatic injury.  < end of copied text >  < from: CT Angio Neck w/ IV Cont (06.03.22 @ 18:45) >    ACC: [id number] EXAM:  CT ANGIO NECK (W)AW IC                        PROCEDURE DATE:  06/03/2022    < IMPRESSION: No vertebral or carotid injury status post C1 fracture. Left   vertebral is well appreciated without evidence of dissection or   disruption. Carotids are intact  --- End of Report ---  < end of copied text >  < from: MR Cervical Spine No Cont (06.04.22 @ 09:53) >    ACC: [id number] EXAM:  MR SPINE CERVICAL                        PROCEDURE DATE:  06/04/2022    IMPRESSION: Nondisplaced fracture at the left anterior arch of C1 and   left body of C1 identified. Prevertebral edema at the C1 level extending  down to the C4-5 level. No definite disruption of the anterior posterior   longitudinal ligaments identified. No evidence of posterior ligamentous   disruption at the level of the ligamentum flavum or interspinous ligaments  --- End of Report ---  < end of copied text >

## 2022-06-07 LAB
ANION GAP SERPL CALC-SCNC: 17 MMOL/L — SIGNIFICANT CHANGE UP (ref 5–17)
BASOPHILS # BLD AUTO: 0 K/UL — SIGNIFICANT CHANGE UP (ref 0–0.2)
BASOPHILS NFR BLD AUTO: 0 % — SIGNIFICANT CHANGE UP (ref 0–2)
BUN SERPL-MCNC: 9.6 MG/DL — SIGNIFICANT CHANGE UP (ref 8–20)
CALCIUM SERPL-MCNC: 8.3 MG/DL — LOW (ref 8.6–10.2)
CHLORIDE SERPL-SCNC: 101 MMOL/L — SIGNIFICANT CHANGE UP (ref 98–107)
CO2 SERPL-SCNC: 23 MMOL/L — SIGNIFICANT CHANGE UP (ref 22–29)
CREAT SERPL-MCNC: 0.56 MG/DL — SIGNIFICANT CHANGE UP (ref 0.5–1.3)
EGFR: 109 ML/MIN/1.73M2 — SIGNIFICANT CHANGE UP
EOSINOPHIL # BLD AUTO: 0.08 K/UL — SIGNIFICANT CHANGE UP (ref 0–0.5)
EOSINOPHIL NFR BLD AUTO: 1.8 % — SIGNIFICANT CHANGE UP (ref 0–6)
GAS PNL BLDA: SIGNIFICANT CHANGE UP
GIANT PLATELETS BLD QL SMEAR: PRESENT — SIGNIFICANT CHANGE UP
GLUCOSE SERPL-MCNC: 104 MG/DL — HIGH (ref 70–99)
HCT VFR BLD CALC: 31 % — LOW (ref 39–50)
HGB BLD-MCNC: 10.3 G/DL — LOW (ref 13–17)
LYMPHOCYTES # BLD AUTO: 0.56 K/UL — LOW (ref 1–3.3)
LYMPHOCYTES # BLD AUTO: 12.3 % — LOW (ref 13–44)
MAGNESIUM SERPL-MCNC: 1.9 MG/DL — SIGNIFICANT CHANGE UP (ref 1.6–2.6)
MANUAL SMEAR VERIFICATION: SIGNIFICANT CHANGE UP
MCHC RBC-ENTMCNC: 32.7 PG — SIGNIFICANT CHANGE UP (ref 27–34)
MCHC RBC-ENTMCNC: 33.2 GM/DL — SIGNIFICANT CHANGE UP (ref 32–36)
MCV RBC AUTO: 98.4 FL — SIGNIFICANT CHANGE UP (ref 80–100)
MONOCYTES # BLD AUTO: 0.48 K/UL — SIGNIFICANT CHANGE UP (ref 0–0.9)
MONOCYTES NFR BLD AUTO: 10.5 % — SIGNIFICANT CHANGE UP (ref 2–14)
NEUTROPHILS # BLD AUTO: 3.31 K/UL — SIGNIFICANT CHANGE UP (ref 1.8–7.4)
NEUTROPHILS NFR BLD AUTO: 72.8 % — SIGNIFICANT CHANGE UP (ref 43–77)
PHOSPHATE SERPL-MCNC: 2.8 MG/DL — SIGNIFICANT CHANGE UP (ref 2.4–4.7)
PLAT MORPH BLD: NORMAL — SIGNIFICANT CHANGE UP
PLATELET # BLD AUTO: 155 K/UL — SIGNIFICANT CHANGE UP (ref 150–400)
POLYCHROMASIA BLD QL SMEAR: SLIGHT — SIGNIFICANT CHANGE UP
POTASSIUM SERPL-MCNC: 3.8 MMOL/L — SIGNIFICANT CHANGE UP (ref 3.5–5.3)
POTASSIUM SERPL-SCNC: 3.8 MMOL/L — SIGNIFICANT CHANGE UP (ref 3.5–5.3)
RBC # BLD: 3.15 M/UL — LOW (ref 4.2–5.8)
RBC # FLD: 12.4 % — SIGNIFICANT CHANGE UP (ref 10.3–14.5)
RBC BLD AUTO: NORMAL — SIGNIFICANT CHANGE UP
SODIUM SERPL-SCNC: 140 MMOL/L — SIGNIFICANT CHANGE UP (ref 135–145)
VARIANT LYMPHS # BLD: 2.6 % — SIGNIFICANT CHANGE UP (ref 0–6)
WBC # BLD: 4.54 K/UL — SIGNIFICANT CHANGE UP (ref 3.8–10.5)
WBC # FLD AUTO: 4.54 K/UL — SIGNIFICANT CHANGE UP (ref 3.8–10.5)

## 2022-06-07 PROCEDURE — 99231 SBSQ HOSP IP/OBS SF/LOW 25: CPT

## 2022-06-07 PROCEDURE — 99291 CRITICAL CARE FIRST HOUR: CPT

## 2022-06-07 RX ORDER — POTASSIUM PHOSPHATE, MONOBASIC POTASSIUM PHOSPHATE, DIBASIC 236; 224 MG/ML; MG/ML
15 INJECTION, SOLUTION INTRAVENOUS ONCE
Refills: 0 | Status: COMPLETED | OUTPATIENT
Start: 2022-06-07 | End: 2022-06-07

## 2022-06-07 RX ORDER — FOLIC ACID 0.8 MG
1 TABLET ORAL DAILY
Refills: 0 | Status: DISCONTINUED | OUTPATIENT
Start: 2022-06-07 | End: 2022-06-13

## 2022-06-07 RX ORDER — MAGNESIUM SULFATE 500 MG/ML
2 VIAL (ML) INJECTION ONCE
Refills: 0 | Status: COMPLETED | OUTPATIENT
Start: 2022-06-07 | End: 2022-06-07

## 2022-06-07 RX ORDER — BACITRACIN ZINC 500 UNIT/G
1 OINTMENT IN PACKET (EA) TOPICAL
Refills: 0 | Status: DISCONTINUED | OUTPATIENT
Start: 2022-06-07 | End: 2022-06-15

## 2022-06-07 RX ORDER — THIAMINE MONONITRATE (VIT B1) 100 MG
100 TABLET ORAL DAILY
Refills: 0 | Status: DISCONTINUED | OUTPATIENT
Start: 2022-06-07 | End: 2022-06-13

## 2022-06-07 RX ADMIN — HYDROMORPHONE HYDROCHLORIDE 0.5 MILLIGRAM(S): 2 INJECTION INTRAMUSCULAR; INTRAVENOUS; SUBCUTANEOUS at 23:45

## 2022-06-07 RX ADMIN — LIDOCAINE 1 PATCH: 4 CREAM TOPICAL at 20:10

## 2022-06-07 RX ADMIN — Medication 25 GRAM(S): at 06:52

## 2022-06-07 RX ADMIN — ALBUTEROL 2.5 MILLIGRAM(S): 90 AEROSOL, METERED ORAL at 06:00

## 2022-06-07 RX ADMIN — POTASSIUM PHOSPHATE, MONOBASIC POTASSIUM PHOSPHATE, DIBASIC 62.5 MILLIMOLE(S): 236; 224 INJECTION, SOLUTION INTRAVENOUS at 14:08

## 2022-06-07 RX ADMIN — ALBUTEROL 2.5 MILLIGRAM(S): 90 AEROSOL, METERED ORAL at 20:36

## 2022-06-07 RX ADMIN — ENOXAPARIN SODIUM 40 MILLIGRAM(S): 100 INJECTION SUBCUTANEOUS at 13:51

## 2022-06-07 RX ADMIN — ALBUTEROL 2.5 MILLIGRAM(S): 90 AEROSOL, METERED ORAL at 14:58

## 2022-06-07 RX ADMIN — LIDOCAINE 1 PATCH: 4 CREAM TOPICAL at 13:52

## 2022-06-07 RX ADMIN — ALBUTEROL 2.5 MILLIGRAM(S): 90 AEROSOL, METERED ORAL at 09:21

## 2022-06-07 RX ADMIN — Medication 1 MILLIGRAM(S): at 17:56

## 2022-06-07 RX ADMIN — Medication 100 MILLIGRAM(S): at 17:57

## 2022-06-07 RX ADMIN — Medication 5 MILLIGRAM(S): at 05:01

## 2022-06-07 RX ADMIN — CHLORHEXIDINE GLUCONATE 1 APPLICATION(S): 213 SOLUTION TOPICAL at 13:57

## 2022-06-07 NOTE — PROGRESS NOTE ADULT - CRITICAL CARE ATTENDING COMMENT
Agitated overnight requiring precedex to be restarted given agitation.    GEN:  Somnolent, arouseable to deep stimulation (precedex just stopped)  NECK:  Cervical collar in place  ABD:  Soft, non tender, non distended  EXT:  R wrist dressing taken down to reveal mattress sutures on right dorsal wrist w/mild surrounding hematoma/swelling, in addition noted to have curvilinear laceration over the metacarpal joint of the index finger w/o surrounding erythema    66 year old gentleman w/hx of psoriatic arthritis and etoh abuse who was admitted 6/2/22 after an intoxicated fall down stairs where he sustained a C1 lateral arch fx and L 6-8 rib fractures and occult pneumothorax.  6/5/22 went into withdrawal and started on precedex.  Poor cough w/copious secretions requiring NT suctioning this am.  R forearm laceration x 2.  Etoh withdrawal.    HD stable.  Precarious (ongoing) respiratory status.  Reportedly w/agitation overnight and re-started on precedex.  Requires aggressive NT suction and chest PT.  Hold precedex and any sedating meds at this time.  May come to intubation if respiratory status declines.  Will check abg (end tidal does not suggest CO2 retention).  Will place small feeding tube for nutrition once respiratory status clarified this am.  Ortho to eval laceration over joint on hang.  Lovenox/scd  OOB/PT when more awake

## 2022-06-07 NOTE — PROGRESS NOTE ADULT - SUBJECTIVE AND OBJECTIVE BOX
HPI:  Patient is a 65 yo M s/p fall from about 15 stairs while intoxicated, trauma B activation. Reports pain in his R wrist where there is an active bleeding laceration. No other complaints at this time. Confused, GCS 14, hemodynamically stable. Denies taking any blood thinners at home. Denies chest pain, SOB, dyspnea, abdominal pain, N/V, dizziness.      (02 Jun 2022 21:17)    66M hospital day 5, Rt C1 anterior arch fx, on precedex for Etoh withdrawal, thin cut MRI from skull base pending, wearing collar    MEDICATIONS  (STANDING):  ALBUTerol    0.083% 2.5 milliGRAM(s) Nebulizer every 6 hours  chlorhexidine 2% Cloths 1 Application(s) Topical daily  enoxaparin Injectable 40 milliGRAM(s) SubCutaneous every 24 hours  lidocaine   4% Patch 1 Patch Transdermal daily  multiple electrolytes Injection Type 1 1000 milliLiter(s) (42 mL/Hr) IV Continuous <Continuous>  potassium phosphate IVPB 15 milliMole(s) IV Intermittent once    MEDICATIONS  (PRN):  acetaminophen     Tablet .. 975 milliGRAM(s) Oral every 6 hours PRN Mild Pain (1 - 3)  HYDROmorphone  Injectable 0.5 milliGRAM(s) IV Push every 4 hours PRN Breakthrough pain  ondansetron Injectable 4 milliGRAM(s) IV Push every 6 hours PRN Nausea and/or Vomiting      COVID-19 PCR: NotDetec (02 Jun 2022 21:25)                          10.3   4.54  )-----------( 155      ( 07 Jun 2022 04:30 )             31.0     06-07    140  |  101  |  9.6  ----------------------------<  104<H>  3.8   |  23.0  |  0.56    Ca    8.3<L>      07 Jun 2022 04:30  Phos  2.8     06-07  Mg     1.9     06-07    IMAGING    Vital Signs Last 24 Hrs  T(C): 37.4 (07 Jun 2022 11:05), Max: 37.4 (07 Jun 2022 11:05)  T(F): 99.4 (07 Jun 2022 11:05), Max: 99.4 (07 Jun 2022 11:05)  HR: 101 (07 Jun 2022 06:45) (61 - 114)  BP: 148/82 (07 Jun 2022 06:30) (75/49 - 178/98)  BP(mean): 99 (07 Jun 2022 06:30) (56 - 124)  RR: 19 (07 Jun 2022 06:45) (11 - 23)  SpO2: 98% (07 Jun 2022 06:45) (84% - 100%)    PE  A&Ox3, gurgling speech  M5/5 UE LE b/l  no sensory deficits

## 2022-06-07 NOTE — PROGRESS NOTE ADULT - NSPROGADDITIONALINFOA_GEN_ALL_CORE
NSGY Attg:    see above    patient seen and examined by PA staff    agree with exam and plan as above  MRI cervical spine with thin cuts through skull base pending

## 2022-06-07 NOTE — PROGRESS NOTE ADULT - ASSESSMENT
ASSESSMENT/PLAN:  66yM s/p fall down stairs while intoxicated sustaining C1 L lateral arch fx and rib fx 6-8 with occult ptx, alcohol withdrawal.    Neuro: continue neuro checks, cervical collar at all times. CIWA for withdrawal continue valium 5 mg q8h. Precedex, wean as tolerated. Continue Tylenol, oxy, Dilaudid PRN, gabapentin for pain control.     Card: Continue hemodynamic monitoring     Pulm: continue PIC protocol for rib fx. and continue pulm toilet with strong encouragement of IS.     GI: Regular diet, Plasma-lyte IVF     : Strict i/o's, UOP adequate.     Endo: No issues     ID: No issues    Skin: Bacitracin to skin laceration    Ext: Negative for fx    Dispo: SICU for ETOH withdrawal

## 2022-06-07 NOTE — PROGRESS NOTE ADULT - ASSESSMENT
Imp Left C1 anterior arch fx    Plan thin cut MRI through skull base, wean off DT regimen per primary team

## 2022-06-07 NOTE — PROGRESS NOTE ADULT - SUBJECTIVE AND OBJECTIVE BOX
24h Events: Patient remains on precedex drip and valium for active ETOH withdrawals. Patient continues to present with Intermittent impulsivity. MJ collar remains in place, tolerating diet well, voiding spontaneously. No overnight events.         1ICU Vital Signs Last 24 Hrs  T(C): 36.8 (06 Jun 2022 20:00), Max: 37.1 (06 Jun 2022 00:22)  T(F): 98.3 (06 Jun 2022 20:00), Max: 98.7 (06 Jun 2022 00:22)  HR: 102 (06 Jun 2022 23:30) (61 - 102)  BP: 172/73 (06 Jun 2022 23:00) (75/49 - 172/73)  BP(mean): 100 (06 Jun 2022 23:00) (56 - 115)  ABP: --  ABP(mean): --  RR: 19 (06 Jun 2022 23:30) (11 - 22)  SpO2: 93% (06 Jun 2022 23:30) (90% - 100%)      I&O's Detail    05 Jun 2022 07:01  -  06 Jun 2022 07:00  --------------------------------------------------------  IN:    Dexmedetomidine: 390.4 mL    IV PiggyBack: 25 mL    IV PiggyBack: 416 mL  Total IN: 831.4 mL    OUT:    Voided (mL): 550 mL  Total OUT: 550 mL    Total NET: 281.4 mL      06 Jun 2022 07:01  -  07 Jun 2022 00:01  --------------------------------------------------------  IN:    Dexmedetomidine: 62.3 mL    multiple electrolytes Injection Type 1.: 168 mL  Total IN: 230.3 mL    OUT:    Voided (mL): 500 mL  Total OUT: 500 mL    Total NET: -269.7 mL            MEDICATIONS  (STANDING):  ALBUTerol    0.083% 2.5 milliGRAM(s) Nebulizer every 6 hours  chlorhexidine 2% Cloths 1 Application(s) Topical daily  dexMEDEtomidine Infusion 0.5 MICROgram(s)/kG/Hr (9.36 mL/Hr) IV Continuous <Continuous>  diazepam  Injectable 5 milliGRAM(s) IV Push <User Schedule>  enoxaparin Injectable 40 milliGRAM(s) SubCutaneous every 24 hours  lidocaine   4% Patch 1 Patch Transdermal daily  multiple electrolytes Injection Type 1 1000 milliLiter(s) (42 mL/Hr) IV Continuous <Continuous>    MEDICATIONS  (PRN):  acetaminophen     Tablet .. 975 milliGRAM(s) Oral every 6 hours PRN Mild Pain (1 - 3)  HYDROmorphone  Injectable 0.5 milliGRAM(s) IV Push every 4 hours PRN Breakthrough pain  ondansetron Injectable 4 milliGRAM(s) IV Push every 6 hours PRN Nausea and/or Vomiting        Physical Exam:    Neurological: GCS 14. Moves all extremities symmetrically. Motor strength and sensation grossly intact.    Pulmonary: Unlabored respiratory effort. Lungs CTAB. Speaking in full sentences. No wheezes.    Cardiovascular: S1, S2, regular rate & rhythm    Gastrointestinal: soft, non-tender, non-distended, no rebound / guarding    : Voiding spontaneously    Skin: warm, dry, no diaphoresis, no pallor, cyanosis, or jaundice        LABS:  CBC Full  -  ( 06 Jun 2022 05:13 )  WBC Count : 5.08 K/uL  RBC Count : 2.90 M/uL  Hemoglobin : 9.5 g/dL  Hematocrit : 28.6 %  Platelet Count - Automated : 106 K/uL  Mean Cell Volume : 98.6 fl  Mean Cell Hemoglobin : 32.8 pg  Mean Cell Hemoglobin Concentration : 33.2 gm/dL  Auto Neutrophil # : x  Auto Lymphocyte # : x  Auto Monocyte # : x  Auto Eosinophil # : x  Auto Basophil # : x  Auto Neutrophil % : x  Auto Lymphocyte % : x  Auto Monocyte % : x  Auto Eosinophil % : x  Auto Basophil % : x    06-06    138  |  101  |  8.6  ----------------------------<  122<H>  4.1   |  26.0  |  0.53    Ca    8.5<L>      06 Jun 2022 05:13  Phos  4.7     06-06  Mg     1.5     06-06          RECENT CULTURES:

## 2022-06-08 LAB
ANION GAP SERPL CALC-SCNC: 14 MMOL/L — SIGNIFICANT CHANGE UP (ref 5–17)
BASOPHILS # BLD AUTO: 0.02 K/UL — SIGNIFICANT CHANGE UP (ref 0–0.2)
BASOPHILS NFR BLD AUTO: 0.4 % — SIGNIFICANT CHANGE UP (ref 0–2)
BUN SERPL-MCNC: 9.1 MG/DL — SIGNIFICANT CHANGE UP (ref 8–20)
CALCIUM SERPL-MCNC: 8.7 MG/DL — SIGNIFICANT CHANGE UP (ref 8.6–10.2)
CHLORIDE SERPL-SCNC: 102 MMOL/L — SIGNIFICANT CHANGE UP (ref 98–107)
CO2 SERPL-SCNC: 25 MMOL/L — SIGNIFICANT CHANGE UP (ref 22–29)
CREAT SERPL-MCNC: 0.59 MG/DL — SIGNIFICANT CHANGE UP (ref 0.5–1.3)
EGFR: 107 ML/MIN/1.73M2 — SIGNIFICANT CHANGE UP
EOSINOPHIL # BLD AUTO: 0.14 K/UL — SIGNIFICANT CHANGE UP (ref 0–0.5)
EOSINOPHIL NFR BLD AUTO: 3 % — SIGNIFICANT CHANGE UP (ref 0–6)
GLUCOSE SERPL-MCNC: 98 MG/DL — SIGNIFICANT CHANGE UP (ref 70–99)
HCT VFR BLD CALC: 29 % — LOW (ref 39–50)
HGB BLD-MCNC: 9.7 G/DL — LOW (ref 13–17)
IMM GRANULOCYTES NFR BLD AUTO: 0.6 % — SIGNIFICANT CHANGE UP (ref 0–1.5)
LYMPHOCYTES # BLD AUTO: 0.87 K/UL — LOW (ref 1–3.3)
LYMPHOCYTES # BLD AUTO: 18.4 % — SIGNIFICANT CHANGE UP (ref 13–44)
MAGNESIUM SERPL-MCNC: 2.1 MG/DL — SIGNIFICANT CHANGE UP (ref 1.6–2.6)
MCHC RBC-ENTMCNC: 33.2 PG — SIGNIFICANT CHANGE UP (ref 27–34)
MCHC RBC-ENTMCNC: 33.4 GM/DL — SIGNIFICANT CHANGE UP (ref 32–36)
MCV RBC AUTO: 99.3 FL — SIGNIFICANT CHANGE UP (ref 80–100)
MONOCYTES # BLD AUTO: 0.6 K/UL — SIGNIFICANT CHANGE UP (ref 0–0.9)
MONOCYTES NFR BLD AUTO: 12.7 % — SIGNIFICANT CHANGE UP (ref 2–14)
NEUTROPHILS # BLD AUTO: 3.06 K/UL — SIGNIFICANT CHANGE UP (ref 1.8–7.4)
NEUTROPHILS NFR BLD AUTO: 64.9 % — SIGNIFICANT CHANGE UP (ref 43–77)
PHOSPHATE SERPL-MCNC: 2.9 MG/DL — SIGNIFICANT CHANGE UP (ref 2.4–4.7)
PLATELET # BLD AUTO: 176 K/UL — SIGNIFICANT CHANGE UP (ref 150–400)
POTASSIUM SERPL-MCNC: 3.4 MMOL/L — LOW (ref 3.5–5.3)
POTASSIUM SERPL-SCNC: 3.4 MMOL/L — LOW (ref 3.5–5.3)
RBC # BLD: 2.92 M/UL — LOW (ref 4.2–5.8)
RBC # FLD: 12.4 % — SIGNIFICANT CHANGE UP (ref 10.3–14.5)
SODIUM SERPL-SCNC: 141 MMOL/L — SIGNIFICANT CHANGE UP (ref 135–145)
WBC # BLD: 4.72 K/UL — SIGNIFICANT CHANGE UP (ref 3.8–10.5)
WBC # FLD AUTO: 4.72 K/UL — SIGNIFICANT CHANGE UP (ref 3.8–10.5)

## 2022-06-08 PROCEDURE — 71045 X-RAY EXAM CHEST 1 VIEW: CPT | Mod: 26

## 2022-06-08 PROCEDURE — 99233 SBSQ HOSP IP/OBS HIGH 50: CPT

## 2022-06-08 RX ORDER — FOLIC ACID 0.8 MG
1 TABLET ORAL
Qty: 0 | Refills: 0 | DISCHARGE

## 2022-06-08 RX ORDER — APREMILAST 10-20-30MG
0 KIT ORAL
Qty: 0 | Refills: 0 | DISCHARGE

## 2022-06-08 RX ORDER — DIAZEPAM 5 MG
2.5 TABLET ORAL EVERY 8 HOURS
Refills: 0 | Status: DISCONTINUED | OUTPATIENT
Start: 2022-06-08 | End: 2022-06-08

## 2022-06-08 RX ORDER — DIAZEPAM 5 MG
2.5 TABLET ORAL EVERY 8 HOURS
Refills: 0 | Status: DISCONTINUED | OUTPATIENT
Start: 2022-06-08 | End: 2022-06-09

## 2022-06-08 RX ORDER — POTASSIUM PHOSPHATE, MONOBASIC POTASSIUM PHOSPHATE, DIBASIC 236; 224 MG/ML; MG/ML
15 INJECTION, SOLUTION INTRAVENOUS ONCE
Refills: 0 | Status: COMPLETED | OUTPATIENT
Start: 2022-06-08 | End: 2022-06-08

## 2022-06-08 RX ORDER — APREMILAST 10-20-30MG
1 KIT ORAL
Qty: 0 | Refills: 0 | DISCHARGE

## 2022-06-08 RX ORDER — ERGOCALCIFEROL 1.25 MG/1
1 CAPSULE ORAL
Qty: 0 | Refills: 0 | DISCHARGE

## 2022-06-08 RX ORDER — ERGOCALCIFEROL 1.25 MG/1
0 CAPSULE ORAL
Qty: 0 | Refills: 0 | DISCHARGE

## 2022-06-08 RX ORDER — POTASSIUM CHLORIDE 20 MEQ
10 PACKET (EA) ORAL
Refills: 0 | Status: COMPLETED | OUTPATIENT
Start: 2022-06-08 | End: 2022-06-08

## 2022-06-08 RX ORDER — APREMILAST 10-20-30MG
30 KIT ORAL DAILY
Refills: 0 | Status: DISCONTINUED | OUTPATIENT
Start: 2022-06-08 | End: 2022-06-15

## 2022-06-08 RX ORDER — MELOXICAM 15 MG/1
1 TABLET ORAL
Qty: 0 | Refills: 0 | DISCHARGE

## 2022-06-08 RX ORDER — QUETIAPINE FUMARATE 200 MG/1
25 TABLET, FILM COATED ORAL ONCE
Refills: 0 | Status: COMPLETED | OUTPATIENT
Start: 2022-06-08 | End: 2022-06-08

## 2022-06-08 RX ORDER — QUETIAPINE FUMARATE 200 MG/1
50 TABLET, FILM COATED ORAL AT BEDTIME
Refills: 0 | Status: DISCONTINUED | OUTPATIENT
Start: 2022-06-08 | End: 2022-06-08

## 2022-06-08 RX ADMIN — ALBUTEROL 2.5 MILLIGRAM(S): 90 AEROSOL, METERED ORAL at 15:13

## 2022-06-08 RX ADMIN — LIDOCAINE 1 PATCH: 4 CREAM TOPICAL at 11:08

## 2022-06-08 RX ADMIN — Medication 100 MILLIEQUIVALENT(S): at 07:00

## 2022-06-08 RX ADMIN — Medication 1 MILLIGRAM(S): at 00:54

## 2022-06-08 RX ADMIN — ENOXAPARIN SODIUM 40 MILLIGRAM(S): 100 INJECTION SUBCUTANEOUS at 08:50

## 2022-06-08 RX ADMIN — QUETIAPINE FUMARATE 25 MILLIGRAM(S): 200 TABLET, FILM COATED ORAL at 14:49

## 2022-06-08 RX ADMIN — Medication 100 MILLIGRAM(S): at 08:54

## 2022-06-08 RX ADMIN — Medication 2 MILLIGRAM(S): at 04:29

## 2022-06-08 RX ADMIN — HYDROMORPHONE HYDROCHLORIDE 0.5 MILLIGRAM(S): 2 INJECTION INTRAMUSCULAR; INTRAVENOUS; SUBCUTANEOUS at 00:00

## 2022-06-08 RX ADMIN — ALBUTEROL 2.5 MILLIGRAM(S): 90 AEROSOL, METERED ORAL at 08:30

## 2022-06-08 RX ADMIN — Medication 1 APPLICATION(S): at 06:12

## 2022-06-08 RX ADMIN — Medication 2.5 MILLIGRAM(S): at 08:49

## 2022-06-08 RX ADMIN — Medication 100 MILLIEQUIVALENT(S): at 08:00

## 2022-06-08 RX ADMIN — LIDOCAINE 1 PATCH: 4 CREAM TOPICAL at 00:45

## 2022-06-08 RX ADMIN — Medication 2.5 MILLIGRAM(S): at 22:48

## 2022-06-08 RX ADMIN — LIDOCAINE 1 PATCH: 4 CREAM TOPICAL at 22:24

## 2022-06-08 RX ADMIN — HYDROMORPHONE HYDROCHLORIDE 0.5 MILLIGRAM(S): 2 INJECTION INTRAMUSCULAR; INTRAVENOUS; SUBCUTANEOUS at 09:23

## 2022-06-08 RX ADMIN — Medication 100 MILLIEQUIVALENT(S): at 06:12

## 2022-06-08 RX ADMIN — HYDROMORPHONE HYDROCHLORIDE 0.5 MILLIGRAM(S): 2 INJECTION INTRAMUSCULAR; INTRAVENOUS; SUBCUTANEOUS at 09:08

## 2022-06-08 RX ADMIN — CHLORHEXIDINE GLUCONATE 1 APPLICATION(S): 213 SOLUTION TOPICAL at 08:56

## 2022-06-08 RX ADMIN — Medication 25 MILLIGRAM(S): at 14:48

## 2022-06-08 RX ADMIN — HYDROMORPHONE HYDROCHLORIDE 0.5 MILLIGRAM(S): 2 INJECTION INTRAMUSCULAR; INTRAVENOUS; SUBCUTANEOUS at 17:54

## 2022-06-08 RX ADMIN — POTASSIUM PHOSPHATE, MONOBASIC POTASSIUM PHOSPHATE, DIBASIC 62.5 MILLIMOLE(S): 236; 224 INJECTION, SOLUTION INTRAVENOUS at 06:12

## 2022-06-08 RX ADMIN — HYDROMORPHONE HYDROCHLORIDE 0.5 MILLIGRAM(S): 2 INJECTION INTRAMUSCULAR; INTRAVENOUS; SUBCUTANEOUS at 18:09

## 2022-06-08 RX ADMIN — ALBUTEROL 2.5 MILLIGRAM(S): 90 AEROSOL, METERED ORAL at 20:44

## 2022-06-08 RX ADMIN — Medication 1 MILLIGRAM(S): at 11:08

## 2022-06-08 RX ADMIN — ALBUTEROL 2.5 MILLIGRAM(S): 90 AEROSOL, METERED ORAL at 03:23

## 2022-06-08 RX ADMIN — Medication 2.5 MILLIGRAM(S): at 14:48

## 2022-06-08 RX ADMIN — LIDOCAINE 1 PATCH: 4 CREAM TOPICAL at 22:49

## 2022-06-08 NOTE — PROGRESS NOTE ADULT - ASSESSMENT
ASSESSMENT/PLAN:  66yM s/p fall down stairs while intoxicated sustaining C1 L lateral arch fx and rib fx 6-8 with occult ptx, alcohol withdrawal.    Neuro: continue neuro checks, cervical collar at all times. Stable CIWA, continue to monitor for withdrawal symptoms. Ativan PRN. Continue Tylenol, oxy, Dilaudid PRN, gabapentin for pain control.     Card: Continue hemodynamic monitoring     Pulm: continue PIC protocol for rib fracture and continue pulm toilet with strong encouragement of IS.     GI: NPO , Plasma-lyte IVF     : Strict i/o's, UOP adequate.     Endo: No issues     ID: No issues    Skin: Bacitracin to skin laceration    Ext: Negative for fx    Dispo: SICU for ETOH withdrawal

## 2022-06-08 NOTE — CONSULT NOTE ADULT - SUBJECTIVE AND OBJECTIVE BOX
ORTHO-HAND SERVICE  Pt Name: MARQUITA MARTINI  MRN: [id number]    Patient is a 66y Male with pmhx of etoh abuse who presented to ER on 6/2 s/p fall down stairs. As per H&P, patient was complaining of right wrist pain. Xrays and CT scan of wrist were negative. Laceration one wrist repaired by surgery team. Ortho consulted for right 2nd metacarpal laceration. Patient confused and unable to answer questions during examination but able answer basic questions. Reports no numbness or tingling. Reports no pain at hand at this time.     REVIEW OF SYSTEMS  General: Alert, responsive, in NAD  Skin/Breast: No rashes, no pruritis 	  Ophthalmologic: No visual changes. No redness. 	  ENMT:	No discharge. No swelling.  Respiratory and Thorax: No difficulty breathing. No cough.	   Cardiovascular: No chest pain. No palpitations.  Gastrointestinal: No abdominal pain. No diarrhea.   Genitourinary: No dysuria. No bleeding.  Musculoskeletal: SEE HPI.  Neurological: No sensory or motor changes.   ROS is otherwise negative.    PAST MEDICAL & SURGICAL HISTORY:  PAST MEDICAL & SURGICAL HISTORY:  HLD (hyperlipidemia)  No significant past surgical history  No significant past surgical history    Allergies: Allergy Status Unknown  No Known Allergies      FAMILY HISTORY:  No pertinent family history in first degree relatives  No pertinent family history in first degree relatives  : non-contributory    Social History:     Daily     Daily                             9.7    4.72  )-----------( 176      ( 08 Jun 2022 03:50 )             29.0       06-08    141  |  102  |  9.1  ----------------------------<  98  3.4<L>   |  25.0  |  0.59    Ca    8.7      08 Jun 2022 03:50  Phos  2.9     06-08  Mg     2.1     06-08      PHYSICAL EXAM:  Appearance: Alert, responsive, in no acute distress.    right upper extremity:  Skin: Moderate swelling noted of right 2nd metacarpal. laceration noted on dorsal aspect of metacarpal. Mild erythema noted. No active bleeding or draining from wound.   Neurological: Sensation is grossly intact to light touch.   Motor: ROM appropriate considering swelling.  No focal deficits or weaknesses found.  Vascular: 2+ distal pulses. Cap refill < 2 sec. No signs of venous  insufficiency  or stasis. No extremity ulcerations. No cyanosis.    Imaging Studies:      ACC: [id number] EXAM:  XR PELVIS AP ONLY 1-2 VIEWS                        ACC: [id number] EXAM:  XR HAND MIN 3 VIEWS RT                        ACC: [id number] EXAM:  XR CHEST AP OR PA 1V                        ACC: [id number] EXAM:  XR FOREARM 2 VIEWS RT                       PROCEDURE DATE:  06/02/2022          INTERPRETATION:  HISTORY: Trauma    TECHNIQUE: Single frontal view of the chest with no prior studies   available for comparison. 3 views of the right hand, 3 views of the right   breast and2 views of the right forearm. Single view of the pelvis    FINDINGS:  Chest: The heart is normal in size. No focal consolidation. Small   subcutaneous emphysema in the inferior left chest wall. Fractures of the   left sixth through eighth ribs.. Question trace left fluid in the left   costophrenic angle.. The apices and hemidiaphragms are unremarkable.   Visualized osseous structures are within normal limits.    Right hand, wrist and forearm: Chronic appearing impacted fracture the   distal rightradius. Marked degenerative changes of the carpal row. No   definite fractures in the carpal row. No definite fracture in the right   hand. Severe degenerative changes of the right first digit. No   subluxation. No proximal radius or ulnar fractures.    Pelvis: Degenerative changes. No definite fracture. No soft tissue   abnormality.    IMPRESSION:  Left sixth through eighth rib fractures with small adjacent chest wall   subcutaneous emphysema and likely trace fluid at the left costophrenic   angle.    Chronic appearing impacted fracture the distal right radius without   definite evidence of acute on chronic fracture however persistent   concern, CT can be obtained.    --- End of Report ---      A/P:  Pt is a 66y Male with right 2nd metacarpal laceration.     PLAN:   * Pain control  * Wrist closure to be monitored by primary team  * Dressing changes as per SICU  * No acute orthopedic intervention at this time.   * Reconsult PRN  * case and imaging d/w Dr. Duran

## 2022-06-08 NOTE — PROGRESS NOTE ADULT - SUBJECTIVE AND OBJECTIVE BOX
24h Events: Patient awake and alert, resting comfortably with no complaints. Denies chest pain, palpitations, and shortness of breath. Significant improvement of mental status with improved agitation. Remains in MJ collar with MRI followed by neurosurgery. Stable CIWA. No overnight events.       ICU Vital Signs Last 24 Hrs  T(C): 37.2 (08 Jun 2022 00:00), Max: 37.4 (07 Jun 2022 11:05)  T(F): 99 (08 Jun 2022 00:00), Max: 99.4 (07 Jun 2022 11:05)  HR: 93 (07 Jun 2022 23:00) (74 - 114)  BP: 166/101 (07 Jun 2022 23:00) (102/61 - 184/103)  BP(mean): 120 (07 Jun 2022 23:00) (73 - 126)  ABP: --  ABP(mean): --  RR: 13 (07 Jun 2022 23:00) (12 - 23)  SpO2: 98% (07 Jun 2022 23:00) (84% - 100%)      I&O's Detail    06 Jun 2022 07:01  -  07 Jun 2022 07:00  --------------------------------------------------------  IN:    Dexmedetomidine: 195.3 mL    IV PiggyBack: 50 mL    multiple electrolytes Injection Type 1.: 462 mL  Total IN: 707.3 mL    OUT:    Voided (mL): 650 mL  Total OUT: 650 mL    Total NET: 57.3 mL      07 Jun 2022 07:01  -  08 Jun 2022 00:27  --------------------------------------------------------  IN:    IV PiggyBack: 62.5 mL    multiple electrolytes Injection Type 1.: 546 mL  Total IN: 608.5 mL    OUT:    Voided (mL): 550 mL  Total OUT: 550 mL    Total NET: 58.5 mL          ABG - ( 07 Jun 2022 11:15 )  pH, Arterial: 7.390 pH, Blood: x     /  pCO2: 45    /  pO2: 72    / HCO3: 27    / Base Excess: 2.2   /  SaO2: 96.7                MEDICATIONS  (STANDING):  ALBUTerol    0.083% 2.5 milliGRAM(s) Nebulizer every 6 hours  BACItracin   Ointment 1 Application(s) Topical two times a day  chlorhexidine 2% Cloths 1 Application(s) Topical daily  enoxaparin Injectable 40 milliGRAM(s) SubCutaneous every 24 hours  folic acid Injectable 1 milliGRAM(s) IV Push daily  lidocaine   4% Patch 1 Patch Transdermal daily  multiple electrolytes Injection Type 1 1000 milliLiter(s) (42 mL/Hr) IV Continuous <Continuous>  thiamine Injectable 100 milliGRAM(s) IV Push daily    MEDICATIONS  (PRN):  acetaminophen     Tablet .. 975 milliGRAM(s) Oral every 6 hours PRN Mild Pain (1 - 3)  HYDROmorphone  Injectable 0.5 milliGRAM(s) IV Push every 4 hours PRN Breakthrough pain  ondansetron Injectable 4 milliGRAM(s) IV Push every 6 hours PRN Nausea and/or Vomiting        Physical Exam:    Neurological: GCS 14. Moves all extremities symmetrically. Motor strength and sensation grossly intact.    Pulmonary: Unlabored respiratory effort. Lungs CTAB. Speaking in full sentences. No wheezes.    Cardiovascular: S1, S2, regular rate & rhythm    Gastrointestinal: soft, non-tender, non-distended, no rebound / guarding    : Voiding spontaneously    Skin: warm, dry, no diaphoresis, no pallor, cyanosis, or jaundice    LABS:  CBC Full  -  ( 07 Jun 2022 04:30 )  WBC Count : 4.54 K/uL  RBC Count : 3.15 M/uL  Hemoglobin : 10.3 g/dL  Hematocrit : 31.0 %  Platelet Count - Automated : 155 K/uL  Mean Cell Volume : 98.4 fl  Mean Cell Hemoglobin : 32.7 pg  Mean Cell Hemoglobin Concentration : 33.2 gm/dL  Auto Neutrophil # : 3.31 K/uL  Auto Lymphocyte # : 0.56 K/uL  Auto Monocyte # : 0.48 K/uL  Auto Eosinophil # : 0.08 K/uL  Auto Basophil # : 0.00 K/uL  Auto Neutrophil % : 72.8 %  Auto Lymphocyte % : 12.3 %  Auto Monocyte % : 10.5 %  Auto Eosinophil % : 1.8 %  Auto Basophil % : 0.0 %    06-07    140  |  101  |  9.6  ----------------------------<  104<H>  3.8   |  23.0  |  0.56    Ca    8.3<L>      07 Jun 2022 04:30  Phos  2.8     06-07  Mg     1.9     06-07          RECENT CULTURES:

## 2022-06-08 NOTE — PROGRESS NOTE ADULT - CRITICAL CARE ATTENDING COMMENT
Became more awake yesterday am, by evening was watching TV and asking appropriate questions.  Intermittent periods of agitation/restlessness ongoing.  Still requiring some NT suctioning but improved.    GEN:  Awake, glasses on, conversant but confused (GCS 14), laceration w/dried blood noted above right eyebrow  NECK:  Cervical collar in place  ABD:  Soft, non tender, non distended  EXT:  R wrist ace wrap in tact    66 year old gentleman w/hx of psoriatic arthritis and etoh abuse who was admitted 6/2/22 after an intoxicated fall down stairs where he sustained a C1 lateral arch fx and L 6-8 rib fractures and occult pneumothorax.  6/5/22 went into withdrawal and started on precedex.  Poor cough w/copious secretions requiring NT suctioning this am.  R forearm laceration x 2.  Etoh withdrawal.    HD stable  Respiratory status improved.  Adequate sats on NC but still requires aggressive suctioning and chest PT  Has not been consistently awake to adequate tolerate PO.  Will place doboff tube for tube feeds/meds  Will change to librium (down feeding tube) to hopefully provide more continuous/even control of DT symptoms - in an effort to keep patient calm but not oversedate  Pending ortho eval of RUE  Will need MRI of C spine when medically stable (currently too restless to tolerate study)  Voiding  PIC protocol - but difficult to get patient to cooperate w/IS for assessment.  Patient continues to deny pain to right chest. Became more awake yesterday am, by evening was watching TV and asking appropriate questions.  Intermittent periods of agitation/restlessness ongoing.  Still requiring some NT suctioning but improved.    GEN:  Awake, glasses on, conversant but confused (GCS 14), laceration w/dried blood noted above right eyebrow  NECK:  Cervical collar in place  ABD:  Soft, non tender, non distended  EXT:  R wrist ace wrap in tact    66 year old gentleman w/hx of psoriatic arthritis and etoh abuse who was admitted 6/2/22 after an intoxicated fall down stairs where he sustained a C1 lateral arch fx and L 6-8 rib fractures and occult pneumothorax.  6/5/22 went into withdrawal and started on precedex.  Poor cough w/copious secretions requiring NT suctioning this am.  R forearm laceration x 2.  Etoh withdrawal.    HD stable  Respiratory status improved.  Adequate sats on NC but still requires aggressive suctioning and chest PT  Has not been consistently awake to adequate tolerate PO.  Will place doboff tube for tube feeds/meds  Will change to librium (down feeding tube) to hopefully provide more continuous/even control of DT symptoms - in an effort to keep patient calm but not oversedate  Pending ortho eval of RUE  Will need MRI of C spine when medically stable (currently too restless to tolerate study)  Voiding    Spoke w/patient's wife at bedside.  Updated her on patient's condition.  She brought in patient's Otezla -will bring to pharmacy.  Answered questions.  PIC protocol - but difficult to get patient to cooperate w/IS for assessment.  Patient continues to deny pain to right chest.

## 2022-06-09 LAB
ANION GAP SERPL CALC-SCNC: 19 MMOL/L — HIGH (ref 5–17)
BASOPHILS # BLD AUTO: 0.05 K/UL — SIGNIFICANT CHANGE UP (ref 0–0.2)
BASOPHILS NFR BLD AUTO: 0.9 % — SIGNIFICANT CHANGE UP (ref 0–2)
BUN SERPL-MCNC: 9.6 MG/DL — SIGNIFICANT CHANGE UP (ref 8–20)
CALCIUM SERPL-MCNC: 9 MG/DL — SIGNIFICANT CHANGE UP (ref 8.6–10.2)
CHLORIDE SERPL-SCNC: 100 MMOL/L — SIGNIFICANT CHANGE UP (ref 98–107)
CO2 SERPL-SCNC: 20 MMOL/L — LOW (ref 22–29)
CREAT SERPL-MCNC: 0.54 MG/DL — SIGNIFICANT CHANGE UP (ref 0.5–1.3)
EGFR: 110 ML/MIN/1.73M2 — SIGNIFICANT CHANGE UP
EOSINOPHIL # BLD AUTO: 0.09 K/UL — SIGNIFICANT CHANGE UP (ref 0–0.5)
EOSINOPHIL NFR BLD AUTO: 1.7 % — SIGNIFICANT CHANGE UP (ref 0–6)
GIANT PLATELETS BLD QL SMEAR: PRESENT — SIGNIFICANT CHANGE UP
GLUCOSE SERPL-MCNC: 89 MG/DL — SIGNIFICANT CHANGE UP (ref 70–99)
HCT VFR BLD CALC: 28.4 % — LOW (ref 39–50)
HGB BLD-MCNC: 9.6 G/DL — LOW (ref 13–17)
LYMPHOCYTES # BLD AUTO: 0.82 K/UL — LOW (ref 1–3.3)
LYMPHOCYTES # BLD AUTO: 14.9 % — SIGNIFICANT CHANGE UP (ref 13–44)
MAGNESIUM SERPL-MCNC: 1.8 MG/DL — SIGNIFICANT CHANGE UP (ref 1.6–2.6)
MANUAL SMEAR VERIFICATION: SIGNIFICANT CHANGE UP
MCHC RBC-ENTMCNC: 33.1 PG — SIGNIFICANT CHANGE UP (ref 27–34)
MCHC RBC-ENTMCNC: 33.8 GM/DL — SIGNIFICANT CHANGE UP (ref 32–36)
MCV RBC AUTO: 97.9 FL — SIGNIFICANT CHANGE UP (ref 80–100)
METAMYELOCYTES # FLD: 0.9 % — HIGH (ref 0–0)
MONOCYTES # BLD AUTO: 0.33 K/UL — SIGNIFICANT CHANGE UP (ref 0–0.9)
MONOCYTES NFR BLD AUTO: 6.1 % — SIGNIFICANT CHANGE UP (ref 2–14)
NEUTROPHILS # BLD AUTO: 3.95 K/UL — SIGNIFICANT CHANGE UP (ref 1.8–7.4)
NEUTROPHILS NFR BLD AUTO: 67.5 % — SIGNIFICANT CHANGE UP (ref 43–77)
NEUTS BAND # BLD: 4.4 % — SIGNIFICANT CHANGE UP (ref 0–8)
PHOSPHATE SERPL-MCNC: 2.6 MG/DL — SIGNIFICANT CHANGE UP (ref 2.4–4.7)
PLAT MORPH BLD: NORMAL — SIGNIFICANT CHANGE UP
PLATELET # BLD AUTO: 193 K/UL — SIGNIFICANT CHANGE UP (ref 150–400)
POLYCHROMASIA BLD QL SMEAR: SLIGHT — SIGNIFICANT CHANGE UP
POTASSIUM SERPL-MCNC: 3.6 MMOL/L — SIGNIFICANT CHANGE UP (ref 3.5–5.3)
POTASSIUM SERPL-SCNC: 3.6 MMOL/L — SIGNIFICANT CHANGE UP (ref 3.5–5.3)
PROMYELOCYTES # FLD: 1.8 % — HIGH (ref 0–0)
RBC # BLD: 2.9 M/UL — LOW (ref 4.2–5.8)
RBC # FLD: 12.3 % — SIGNIFICANT CHANGE UP (ref 10.3–14.5)
RBC BLD AUTO: ABNORMAL
SODIUM SERPL-SCNC: 139 MMOL/L — SIGNIFICANT CHANGE UP (ref 135–145)
VARIANT LYMPHS # BLD: 1.8 % — SIGNIFICANT CHANGE UP (ref 0–6)
WBC # BLD: 5.49 K/UL — SIGNIFICANT CHANGE UP (ref 3.8–10.5)
WBC # FLD AUTO: 5.49 K/UL — SIGNIFICANT CHANGE UP (ref 3.8–10.5)

## 2022-06-09 PROCEDURE — 99233 SBSQ HOSP IP/OBS HIGH 50: CPT

## 2022-06-09 RX ORDER — DIAZEPAM 5 MG
2.5 TABLET ORAL ONCE
Refills: 0 | Status: DISCONTINUED | OUTPATIENT
Start: 2022-06-11 | End: 2022-06-11

## 2022-06-09 RX ORDER — METOPROLOL TARTRATE 50 MG
5 TABLET ORAL EVERY 6 HOURS
Refills: 0 | Status: DISCONTINUED | OUTPATIENT
Start: 2022-06-09 | End: 2022-06-11

## 2022-06-09 RX ORDER — DIAZEPAM 5 MG
2.5 TABLET ORAL
Refills: 0 | Status: DISCONTINUED | OUTPATIENT
Start: 2022-06-10 | End: 2022-06-10

## 2022-06-09 RX ORDER — SODIUM CHLORIDE 9 MG/ML
1000 INJECTION, SOLUTION INTRAVENOUS
Refills: 0 | Status: DISCONTINUED | OUTPATIENT
Start: 2022-06-09 | End: 2022-06-13

## 2022-06-09 RX ORDER — DIAZEPAM 5 MG
2.5 TABLET ORAL ONCE
Refills: 0 | Status: DISCONTINUED | OUTPATIENT
Start: 2022-06-09 | End: 2022-06-09

## 2022-06-09 RX ORDER — POTASSIUM PHOSPHATE, MONOBASIC POTASSIUM PHOSPHATE, DIBASIC 236; 224 MG/ML; MG/ML
15 INJECTION, SOLUTION INTRAVENOUS ONCE
Refills: 0 | Status: COMPLETED | OUTPATIENT
Start: 2022-06-09 | End: 2022-06-09

## 2022-06-09 RX ORDER — METOPROLOL TARTRATE 50 MG
5 TABLET ORAL ONCE
Refills: 0 | Status: COMPLETED | OUTPATIENT
Start: 2022-06-09 | End: 2022-06-09

## 2022-06-09 RX ORDER — MAGNESIUM SULFATE 500 MG/ML
1 VIAL (ML) INJECTION ONCE
Refills: 0 | Status: COMPLETED | OUTPATIENT
Start: 2022-06-09 | End: 2022-06-09

## 2022-06-09 RX ADMIN — Medication 100 MILLIGRAM(S): at 12:39

## 2022-06-09 RX ADMIN — Medication 2.5 MILLIGRAM(S): at 05:44

## 2022-06-09 RX ADMIN — SODIUM CHLORIDE 75 MILLILITER(S): 9 INJECTION, SOLUTION INTRAVENOUS at 13:39

## 2022-06-09 RX ADMIN — Medication 5 MILLIGRAM(S): at 19:12

## 2022-06-09 RX ADMIN — Medication 2.5 MILLIGRAM(S): at 13:39

## 2022-06-09 RX ADMIN — Medication 2.5 MILLIGRAM(S): at 00:15

## 2022-06-09 RX ADMIN — Medication 2.5 MILLIGRAM(S): at 21:51

## 2022-06-09 RX ADMIN — ALBUTEROL 2.5 MILLIGRAM(S): 90 AEROSOL, METERED ORAL at 15:25

## 2022-06-09 RX ADMIN — CHLORHEXIDINE GLUCONATE 1 APPLICATION(S): 213 SOLUTION TOPICAL at 11:52

## 2022-06-09 RX ADMIN — POTASSIUM PHOSPHATE, MONOBASIC POTASSIUM PHOSPHATE, DIBASIC 62.5 MILLIMOLE(S): 236; 224 INJECTION, SOLUTION INTRAVENOUS at 10:15

## 2022-06-09 RX ADMIN — SODIUM CHLORIDE 42 MILLILITER(S): 9 INJECTION, SOLUTION INTRAVENOUS at 10:15

## 2022-06-09 RX ADMIN — Medication 1 MILLIGRAM(S): at 11:53

## 2022-06-09 RX ADMIN — ALBUTEROL 2.5 MILLIGRAM(S): 90 AEROSOL, METERED ORAL at 09:15

## 2022-06-09 RX ADMIN — ENOXAPARIN SODIUM 40 MILLIGRAM(S): 100 INJECTION SUBCUTANEOUS at 10:15

## 2022-06-09 RX ADMIN — Medication 1 APPLICATION(S): at 05:44

## 2022-06-09 RX ADMIN — ALBUTEROL 2.5 MILLIGRAM(S): 90 AEROSOL, METERED ORAL at 03:25

## 2022-06-09 RX ADMIN — SODIUM CHLORIDE 75 MILLILITER(S): 9 INJECTION, SOLUTION INTRAVENOUS at 21:58

## 2022-06-09 RX ADMIN — Medication 100 GRAM(S): at 10:15

## 2022-06-09 RX ADMIN — ALBUTEROL 2.5 MILLIGRAM(S): 90 AEROSOL, METERED ORAL at 22:09

## 2022-06-09 RX ADMIN — Medication 1 APPLICATION(S): at 19:13

## 2022-06-09 NOTE — SWALLOW BEDSIDE ASSESSMENT ADULT - COMMENTS
As per MD note: :66yM s/p fall down stairs while intoxicated sustaining C1 L lateral arch fx and rib fx 6-8 with occult ptx, alcohol withdrawal."

## 2022-06-09 NOTE — PROGRESS NOTE ADULT - REASON FOR ADMISSION
C1 L lateral arch fx, 6-8 Rib fx, ETOH withdrawal
C1 fracture
C1 left Lateral arch fx
fall- cervical fracture C1
trauma

## 2022-06-09 NOTE — PROGRESS NOTE ADULT - TIME BILLING
Continues to wax and wane neurologically.  Still restless at times.    GEN:  Awake, glasses on, conversant but confused (GCS 14), laceration w/dried blood noted above right eyebrow  NECK:  Cervical collar in place  ABD:  Soft, non tender, non distended  EXT:  R 2nd metacarbal w/laceration and mild erythema, wrist laceration sutured    66 year old gentleman w/hx of psoriatic arthritis and etoh abuse who was admitted 6/2/22 after an intoxicated fall down stairs where he sustained a C1 lateral arch fx and L 6-8 rib fractures and occult pneumothorax.  6/5/22 went into withdrawal and started on precedex.  Poor cough w/copious secretions requiring NT suctioning this am.  R forearm laceration x 2.  Ongoing Etoh withdrawal.    HD stable  Adequate sats on RA.  OOB to chair w/assistance.  Re-place doboff tube (for meds and tube feeds) as patient although appearing more awake failed formal speech and swallow eval.    Back on valium for withdrawal but would prefer librium if patient can be given PO.  Appreciate ortho input.  Voiding.  Patinet requires a 1:1 for re-direction.  No acute critical care issues at this time and will transfer from critical care.

## 2022-06-09 NOTE — PROGRESS NOTE ADULT - SUBJECTIVE AND OBJECTIVE BOX
24h Events:  ortho / hand consulted for 2nd metatarsal laceration, no intervention at this time. Failed dysphagia screen. NGT placed, pt started back on librium and Seroquel via NGT. Pt pulled out NGT, NGT replaced, and pt pulled out NGT again. Decision made to leave NGT out for now. Librium and seroquel D/C as patient not able to swallow pills due to mental status, Valium 2.5 mg q8 restarted. Overnight, patient had 1 episode of acute agitation, attempting to climb out of bed, getting aggressive with nursing. Pt received 2.5 mg valium x 2 with improvement in symptoms.     ICU Vital Signs Last 24 Hrs  T(C): 37.1 (08 Jun 2022 23:48), Max: 37.3 (08 Jun 2022 16:01)  T(F): 98.7 (08 Jun 2022 23:48), Max: 99.2 (08 Jun 2022 16:01)  HR: 98 (09 Jun 2022 04:00) (83 - 110)  BP: 187/92 (09 Jun 2022 04:00) (129/85 - 188/158)  BP(mean): 115 (09 Jun 2022 04:00) (99 - 167)  ABP: --  ABP(mean): --  RR: 13 (09 Jun 2022 01:00) (9 - 26)  SpO2: 100% (09 Jun 2022 00:00) (91% - 100%)    I&O's Detail    07 Jun 2022 07:01  -  08 Jun 2022 07:00  --------------------------------------------------------  IN:    IV PiggyBack: 312.5 mL    IV PiggyBack: 100 mL    multiple electrolytes Injection Type 1.: 756 mL  Total IN: 1168.5 mL    OUT:    Voided (mL): 550 mL  Total OUT: 550 mL    Total NET: 618.5 mL      08 Jun 2022 07:01  -  09 Jun 2022 04:17  --------------------------------------------------------  IN:    Enteral Tube Flush: 120 mL    IV PiggyBack: 200 mL    multiple electrolytes Injection Type 1.: 504 mL  Total IN: 824 mL    OUT:    Voided (mL): 350 mL  Total OUT: 350 mL    Total NET: 474 mL    ABG - ( 07 Jun 2022 11:15 )  pH, Arterial: 7.390 pH, Blood: x     /  pCO2: 45    /  pO2: 72    / HCO3: 27    / Base Excess: 2.2   /  SaO2: 96.7      MEDICATIONS  (STANDING):  ALBUTerol    0.083% 2.5 milliGRAM(s) Nebulizer every 6 hours  apremilast 30 milliGRAM(s) Oral daily  BACItracin   Ointment 1 Application(s) Topical two times a day  chlorhexidine 2% Cloths 1 Application(s) Topical daily  diazepam  Injectable 2.5 milliGRAM(s) IV Push every 8 hours  enoxaparin Injectable 40 milliGRAM(s) SubCutaneous every 24 hours  folic acid Injectable 1 milliGRAM(s) IV Push daily  lidocaine   4% Patch 1 Patch Transdermal daily  multiple electrolytes Injection Type 1 1000 milliLiter(s) (42 mL/Hr) IV Continuous <Continuous>  thiamine Injectable 100 milliGRAM(s) IV Push daily    MEDICATIONS  (PRN):  acetaminophen     Tablet .. 975 milliGRAM(s) Oral every 6 hours PRN Mild Pain (1 - 3)  HYDROmorphone  Injectable 0.5 milliGRAM(s) IV Push every 4 hours PRN Breakthrough pain  ondansetron Injectable 4 milliGRAM(s) IV Push every 6 hours PRN Nausea and/or Vomiting    Physical Exam:    Gen: disorganized, intermittent episodes of agitation  HEENT: PERRL, EOMI  Neurological: confused, moves all extremities spontaneously, intermittently following commands  Neck: Trachea midline, C collar in place  Pulmonary: CTAB with decreased breath sounds at the bases  Cardiovascular: regular rate and rhythm  Gastrointestinal: Soft, non-tender, non-distended  : voiding clear yellow urine   Extremities: R wrist w/ laceration s/p repair, dressing with ACE wrap  Skin: Laceration to R wrist, 2nd metatarsal, and superficial abrasion to scalp  Musculoskeletal: FROM without pain, no deformity or areas of tenderness    LABS:  CBC Full  -  ( 08 Jun 2022 03:50 )  WBC Count : 4.72 K/uL  RBC Count : 2.92 M/uL  Hemoglobin : 9.7 g/dL  Hematocrit : 29.0 %  Platelet Count - Automated : 176 K/uL  Mean Cell Volume : 99.3 fl  Mean Cell Hemoglobin : 33.2 pg  Mean Cell Hemoglobin Concentration : 33.4 gm/dL  Auto Neutrophil # : 3.06 K/uL  Auto Lymphocyte # : 0.87 K/uL  Auto Monocyte # : 0.60 K/uL  Auto Eosinophil # : 0.14 K/uL  Auto Basophil # : 0.02 K/uL  Auto Neutrophil % : 64.9 %  Auto Lymphocyte % : 18.4 %  Auto Monocyte % : 12.7 %  Auto Eosinophil % : 3.0 %  Auto Basophil % : 0.4 %    06-08    141  |  102  |  9.1  ----------------------------<  98  3.4<L>   |  25.0  |  0.59    Ca    8.7      08 Jun 2022 03:50  Phos  2.9     06-08  Mg     2.1     06-08    RECENT CULTURES:    ASSESSMENT/PLAN:  66yM s/p fall down stairs while intoxicated sustaining C1 L lateral arch fx and rib fx 6-8 with occult ptx, alcohol withdrawal.    Neuro:   - valium 2.5 mg q 8 for ETOH withdrawal  - continue folic acid, thiamine and multivitamin  - tylenol for mild - moderate pain, dilaudid for severe pain  - lidocaine patch  - CIWA  - maintain C collar    CV:   - HD stable  - continue telemetry    Pulm:   - PIC protocol if patient will comply  - encourage coughing and deep breathing    GI/Nutrition:   - formal speech and swallow eval today  - can consider replacing NGT, however it seems to be worsening agitation and delirium in the setting of ETOH withdrawal    /Renal:   - voiding, record I/Os as able, pt with multiple episodes of incontinence  - f/u AM labs, replete as needed    ID:   - no issues    Endo:   - no issues    Skin:  - Repositioning for DTI prevention while in bed    Heme/DVT Prophylaxis:   - SCDs  - lovenox     Lines/Tubes:   - PIVs    Dispo:   - SICU for ETOH withdrawal

## 2022-06-09 NOTE — SWALLOW BEDSIDE ASSESSMENT ADULT - ASPIRATION PRECAUTIONS
Speech Therapy: acknowledge order received. Discussed with RN who reports no concerns with alma/stoma cares or with tolerance of general diet. Unclear reason for re-order per chart review and discussion with RN. At baseline patient is independent with alma cares and tolerates a general/thin diet. SLP to sign off; please re-consult if any ongoing concerns.    yes

## 2022-06-09 NOTE — SWALLOW BEDSIDE ASSESSMENT ADULT - SWALLOW EVAL: DIAGNOSIS
Oral stage WFL for administered puree trials. Pharyngeal dysphagia suspected for puree with multiple swallows & overt s/s aspiration post intake. No other PO given due to aspiration risk.

## 2022-06-10 LAB
ANION GAP SERPL CALC-SCNC: 15 MMOL/L — SIGNIFICANT CHANGE UP (ref 5–17)
BASOPHILS # BLD AUTO: 0.03 K/UL — SIGNIFICANT CHANGE UP (ref 0–0.2)
BASOPHILS NFR BLD AUTO: 0.6 % — SIGNIFICANT CHANGE UP (ref 0–2)
BUN SERPL-MCNC: 9.1 MG/DL — SIGNIFICANT CHANGE UP (ref 8–20)
CALCIUM SERPL-MCNC: 8.9 MG/DL — SIGNIFICANT CHANGE UP (ref 8.6–10.2)
CHLORIDE SERPL-SCNC: 102 MMOL/L — SIGNIFICANT CHANGE UP (ref 98–107)
CO2 SERPL-SCNC: 21 MMOL/L — LOW (ref 22–29)
CREAT SERPL-MCNC: 0.59 MG/DL — SIGNIFICANT CHANGE UP (ref 0.5–1.3)
EGFR: 107 ML/MIN/1.73M2 — SIGNIFICANT CHANGE UP
EOSINOPHIL # BLD AUTO: 0.22 K/UL — SIGNIFICANT CHANGE UP (ref 0–0.5)
EOSINOPHIL NFR BLD AUTO: 4.5 % — SIGNIFICANT CHANGE UP (ref 0–6)
GLUCOSE SERPL-MCNC: 101 MG/DL — HIGH (ref 70–99)
HCT VFR BLD CALC: 28.3 % — LOW (ref 39–50)
HGB BLD-MCNC: 9.6 G/DL — LOW (ref 13–17)
IMM GRANULOCYTES NFR BLD AUTO: 0.8 % — SIGNIFICANT CHANGE UP (ref 0–1.5)
LYMPHOCYTES # BLD AUTO: 0.79 K/UL — LOW (ref 1–3.3)
LYMPHOCYTES # BLD AUTO: 16.3 % — SIGNIFICANT CHANGE UP (ref 13–44)
MAGNESIUM SERPL-MCNC: 1.6 MG/DL — SIGNIFICANT CHANGE UP (ref 1.6–2.6)
MCHC RBC-ENTMCNC: 33.1 PG — SIGNIFICANT CHANGE UP (ref 27–34)
MCHC RBC-ENTMCNC: 33.9 GM/DL — SIGNIFICANT CHANGE UP (ref 32–36)
MCV RBC AUTO: 97.6 FL — SIGNIFICANT CHANGE UP (ref 80–100)
MONOCYTES # BLD AUTO: 0.71 K/UL — SIGNIFICANT CHANGE UP (ref 0–0.9)
MONOCYTES NFR BLD AUTO: 14.7 % — HIGH (ref 2–14)
NEUTROPHILS # BLD AUTO: 3.05 K/UL — SIGNIFICANT CHANGE UP (ref 1.8–7.4)
NEUTROPHILS NFR BLD AUTO: 63.1 % — SIGNIFICANT CHANGE UP (ref 43–77)
PHOSPHATE SERPL-MCNC: 2.8 MG/DL — SIGNIFICANT CHANGE UP (ref 2.4–4.7)
PLATELET # BLD AUTO: 231 K/UL — SIGNIFICANT CHANGE UP (ref 150–400)
POTASSIUM SERPL-MCNC: 3.4 MMOL/L — LOW (ref 3.5–5.3)
POTASSIUM SERPL-SCNC: 3.4 MMOL/L — LOW (ref 3.5–5.3)
RBC # BLD: 2.9 M/UL — LOW (ref 4.2–5.8)
RBC # FLD: 12.5 % — SIGNIFICANT CHANGE UP (ref 10.3–14.5)
SODIUM SERPL-SCNC: 138 MMOL/L — SIGNIFICANT CHANGE UP (ref 135–145)
WBC # BLD: 4.84 K/UL — SIGNIFICANT CHANGE UP (ref 3.8–10.5)
WBC # FLD AUTO: 4.84 K/UL — SIGNIFICANT CHANGE UP (ref 3.8–10.5)

## 2022-06-10 RX ORDER — HALOPERIDOL DECANOATE 100 MG/ML
5 INJECTION INTRAMUSCULAR ONCE
Refills: 0 | Status: COMPLETED | OUTPATIENT
Start: 2022-06-10 | End: 2022-06-10

## 2022-06-10 RX ORDER — POTASSIUM CHLORIDE 20 MEQ
10 PACKET (EA) ORAL
Refills: 0 | Status: COMPLETED | OUTPATIENT
Start: 2022-06-10 | End: 2022-06-10

## 2022-06-10 RX ORDER — MAGNESIUM SULFATE 500 MG/ML
2 VIAL (ML) INJECTION ONCE
Refills: 0 | Status: COMPLETED | OUTPATIENT
Start: 2022-06-10 | End: 2022-06-10

## 2022-06-10 RX ORDER — HALOPERIDOL DECANOATE 100 MG/ML
5 INJECTION INTRAMUSCULAR ONCE
Refills: 0 | Status: DISCONTINUED | OUTPATIENT
Start: 2022-06-10 | End: 2022-06-10

## 2022-06-10 RX ADMIN — SODIUM CHLORIDE 75 MILLILITER(S): 9 INJECTION, SOLUTION INTRAVENOUS at 18:07

## 2022-06-10 RX ADMIN — Medication 5 MILLIGRAM(S): at 23:40

## 2022-06-10 RX ADMIN — HALOPERIDOL DECANOATE 5 MILLIGRAM(S): 100 INJECTION INTRAMUSCULAR at 04:35

## 2022-06-10 RX ADMIN — ALBUTEROL 2.5 MILLIGRAM(S): 90 AEROSOL, METERED ORAL at 21:14

## 2022-06-10 RX ADMIN — Medication 5 MILLIGRAM(S): at 05:36

## 2022-06-10 RX ADMIN — ALBUTEROL 2.5 MILLIGRAM(S): 90 AEROSOL, METERED ORAL at 03:55

## 2022-06-10 RX ADMIN — Medication 1 MILLIGRAM(S): at 12:20

## 2022-06-10 RX ADMIN — Medication 2.5 MILLIGRAM(S): at 05:36

## 2022-06-10 RX ADMIN — ALBUTEROL 2.5 MILLIGRAM(S): 90 AEROSOL, METERED ORAL at 15:30

## 2022-06-10 RX ADMIN — Medication 5 MILLIGRAM(S): at 18:07

## 2022-06-10 RX ADMIN — Medication 5 MILLIGRAM(S): at 00:16

## 2022-06-10 RX ADMIN — Medication 100 MILLIEQUIVALENT(S): at 09:25

## 2022-06-10 RX ADMIN — Medication 25 GRAM(S): at 16:45

## 2022-06-10 RX ADMIN — Medication 1 APPLICATION(S): at 18:08

## 2022-06-10 RX ADMIN — APREMILAST 30 MILLIGRAM(S): KIT ORAL at 18:23

## 2022-06-10 RX ADMIN — Medication 100 MILLIGRAM(S): at 12:19

## 2022-06-10 RX ADMIN — Medication 100 MILLIEQUIVALENT(S): at 08:03

## 2022-06-10 RX ADMIN — Medication 100 MILLIEQUIVALENT(S): at 10:16

## 2022-06-10 RX ADMIN — HALOPERIDOL DECANOATE 5 MILLIGRAM(S): 100 INJECTION INTRAMUSCULAR at 23:39

## 2022-06-10 RX ADMIN — ENOXAPARIN SODIUM 40 MILLIGRAM(S): 100 INJECTION SUBCUTANEOUS at 12:20

## 2022-06-10 RX ADMIN — Medication 1 APPLICATION(S): at 05:37

## 2022-06-10 RX ADMIN — Medication 5 MILLIGRAM(S): at 12:48

## 2022-06-10 RX ADMIN — ALBUTEROL 2.5 MILLIGRAM(S): 90 AEROSOL, METERED ORAL at 10:46

## 2022-06-10 RX ADMIN — Medication 2.5 MILLIGRAM(S): at 18:29

## 2022-06-10 NOTE — PROGRESS NOTE ADULT - ASSESSMENT
67 y/o male s/p fall down stairs while intoxicated sustaining C1 L lateral arch fx and rib fx 6-8 with occult ptx, alcohol withdrawal, on constant observation, CIWA protocol, valium taper.  Did not pass swallow eval yesterday afternoon.    - Continue CIWA  - C-collar immobilization at all times  - Continue PIC protocol  - continue 1:1 constant observation for impulsivity  - Local wound care  - F/up PT/OT consultation  - Consider NGT for nutrition if remains NPO (although he pulled out NGT x2)   - Consider speech and swallow re-evaluation when more lucid  - DVT prophylaxis  - Dispo planning.

## 2022-06-10 NOTE — PROGRESS NOTE ADULT - ATTENDING COMMENTS
65 y/o male s/p fall down stairs while intoxicated sustaining C1 L lateral arch fx and rib fx 6-8 with occult ptx, alcohol withdrawal, on constant observation, CIWA protocol, valium taper.  Did not pass swallow eval yesterday afternoon.  Awake alert today  Answering simple questions appropriately  hemodynamic intact  Abdomen soft  he failed swallow yesterday, for he was oversedated. Today I did bedside swallow and he is doing fine  Place on soft diet    - Continue CIWA  - C-collar immobilization at all times  - Continue PIC protocol  - continue 1:1 constant observation for impulsivity  - Local wound care  - F/up PT/OT consultation  - DVT prophylaxis  - Dispo planning.

## 2022-06-10 NOTE — PROGRESS NOTE ADULT - SUBJECTIVE AND OBJECTIVE BOX
Trauma/ACS    SUBJECTIVE: Pt seen and examined on rounds with team. No acute events overnight. Pt. continues to require constant observation. Pulling at lines, c-collar.      OBJECTIVE    PHYSICAL EXAM:   General: NAD, Lying in bed, asking to go home  Neuro: Awake and interactive, answers questions  Extremities: b/l wrist restraints  GI/Abd: Soft, NT/ND,       VITALS  T(C): 36.7 (06-09-22 @ 19:58), Max: 37 (06-09-22 @ 18:50)  HR: 87 (06-10-22 @ 00:00) (79 - 114)  BP: 166/96 (06-10-22 @ 00:00) (148/89 - 197/86)  RR: 18 (06-09-22 @ 19:58) (11 - 22)  SpO2: 94% (06-09-22 @ 22:17) (89% - 100%)  CAPILLARY BLOOD GLUCOSE          Is/Os    06-08 @ 07:01  -  06-09 @ 07:00  --------------------------------------------------------  IN:    Enteral Tube Flush: 120 mL    IV PiggyBack: 200 mL    multiple electrolytes Injection Type 1.: 504 mL  Total IN: 824 mL    OUT:    Voided (mL): 850 mL  Total OUT: 850 mL    Total NET: -26 mL      06-09 @ 07:01  -  06-10 @ 00:56  --------------------------------------------------------  IN:    dextrose 5% + lactated ringers: 375 mL    IV PiggyBack: 250 mL    IV PiggyBack: 100 mL    multiple electrolytes Injection Type 1.: 252 mL  Total IN: 977 mL    OUT:    Voided (mL): 1300 mL  Total OUT: 1300 mL    Total NET: -323 mL          MEDICATIONS (STANDING): ALBUTerol    0.083% 2.5 milliGRAM(s) Nebulizer every 6 hours  apremilast 30 milliGRAM(s) Oral daily  dextrose 5% + lactated ringers. 1000 milliLiter(s) IV Continuous <Continuous>  diazepam  Injectable 2.5 milliGRAM(s) IV Push <User Schedule>  enoxaparin Injectable 40 milliGRAM(s) SubCutaneous every 24 hours  folic acid Injectable 1 milliGRAM(s) IV Push daily  metoprolol tartrate Injectable 5 milliGRAM(s) IV Push every 6 hours  thiamine Injectable 100 milliGRAM(s) IV Push daily    MEDICATIONS (PRN):acetaminophen     Tablet .. 975 milliGRAM(s) Oral every 6 hours PRN Mild Pain (1 - 3)  HYDROmorphone  Injectable 0.5 milliGRAM(s) IV Push every 4 hours PRN Breakthrough pain  ondansetron Injectable 4 milliGRAM(s) IV Push every 6 hours PRN Nausea and/or Vomiting      LABS  CBC (06-09 @ 04:55)                              9.6<L>                         5.49    )----------------(  193        67.5  % Neutrophils, 14.9  % Lymphocytes, ANC: 3.95                                28.4<L>    BMP (06-09 @ 04:55)             139     |  100     |  9.6   		Ca++ --      Ca 9.0                ---------------------------------( 89    		Mg 1.8                3.6     |  20.0<L>  |  0.54  			Ph 2.6

## 2022-06-11 LAB
ANION GAP SERPL CALC-SCNC: 14 MMOL/L — SIGNIFICANT CHANGE UP (ref 5–17)
BASOPHILS # BLD AUTO: 0.04 K/UL — SIGNIFICANT CHANGE UP (ref 0–0.2)
BASOPHILS NFR BLD AUTO: 0.7 % — SIGNIFICANT CHANGE UP (ref 0–2)
BUN SERPL-MCNC: 10.4 MG/DL — SIGNIFICANT CHANGE UP (ref 8–20)
CALCIUM SERPL-MCNC: 9 MG/DL — SIGNIFICANT CHANGE UP (ref 8.6–10.2)
CHLORIDE SERPL-SCNC: 103 MMOL/L — SIGNIFICANT CHANGE UP (ref 98–107)
CO2 SERPL-SCNC: 21 MMOL/L — LOW (ref 22–29)
CREAT SERPL-MCNC: 0.69 MG/DL — SIGNIFICANT CHANGE UP (ref 0.5–1.3)
EGFR: 102 ML/MIN/1.73M2 — SIGNIFICANT CHANGE UP
EOSINOPHIL # BLD AUTO: 0.23 K/UL — SIGNIFICANT CHANGE UP (ref 0–0.5)
EOSINOPHIL NFR BLD AUTO: 3.9 % — SIGNIFICANT CHANGE UP (ref 0–6)
GLUCOSE SERPL-MCNC: 92 MG/DL — SIGNIFICANT CHANGE UP (ref 70–99)
HCT VFR BLD CALC: 29.6 % — LOW (ref 39–50)
HGB BLD-MCNC: 9.8 G/DL — LOW (ref 13–17)
IMM GRANULOCYTES NFR BLD AUTO: 0.8 % — SIGNIFICANT CHANGE UP (ref 0–1.5)
LYMPHOCYTES # BLD AUTO: 1.06 K/UL — SIGNIFICANT CHANGE UP (ref 1–3.3)
LYMPHOCYTES # BLD AUTO: 18 % — SIGNIFICANT CHANGE UP (ref 13–44)
MAGNESIUM SERPL-MCNC: 1.9 MG/DL — SIGNIFICANT CHANGE UP (ref 1.6–2.6)
MCHC RBC-ENTMCNC: 32.7 PG — SIGNIFICANT CHANGE UP (ref 27–34)
MCHC RBC-ENTMCNC: 33.1 GM/DL — SIGNIFICANT CHANGE UP (ref 32–36)
MCV RBC AUTO: 98.7 FL — SIGNIFICANT CHANGE UP (ref 80–100)
MONOCYTES # BLD AUTO: 0.79 K/UL — SIGNIFICANT CHANGE UP (ref 0–0.9)
MONOCYTES NFR BLD AUTO: 13.4 % — SIGNIFICANT CHANGE UP (ref 2–14)
NEUTROPHILS # BLD AUTO: 3.73 K/UL — SIGNIFICANT CHANGE UP (ref 1.8–7.4)
NEUTROPHILS NFR BLD AUTO: 63.2 % — SIGNIFICANT CHANGE UP (ref 43–77)
PLATELET # BLD AUTO: 272 K/UL — SIGNIFICANT CHANGE UP (ref 150–400)
POTASSIUM SERPL-MCNC: 3.8 MMOL/L — SIGNIFICANT CHANGE UP (ref 3.5–5.3)
POTASSIUM SERPL-SCNC: 3.8 MMOL/L — SIGNIFICANT CHANGE UP (ref 3.5–5.3)
RBC # BLD: 3 M/UL — LOW (ref 4.2–5.8)
RBC # FLD: 13.1 % — SIGNIFICANT CHANGE UP (ref 10.3–14.5)
SODIUM SERPL-SCNC: 138 MMOL/L — SIGNIFICANT CHANGE UP (ref 135–145)
WBC # BLD: 5.9 K/UL — SIGNIFICANT CHANGE UP (ref 3.8–10.5)
WBC # FLD AUTO: 5.9 K/UL — SIGNIFICANT CHANGE UP (ref 3.8–10.5)

## 2022-06-11 PROCEDURE — 99231 SBSQ HOSP IP/OBS SF/LOW 25: CPT | Mod: GC

## 2022-06-11 RX ORDER — POTASSIUM CHLORIDE 20 MEQ
20 PACKET (EA) ORAL ONCE
Refills: 0 | Status: COMPLETED | OUTPATIENT
Start: 2022-06-11 | End: 2022-06-11

## 2022-06-11 RX ORDER — METOPROLOL TARTRATE 50 MG
12.5 TABLET ORAL EVERY 12 HOURS
Refills: 0 | Status: DISCONTINUED | OUTPATIENT
Start: 2022-06-11 | End: 2022-06-11

## 2022-06-11 RX ORDER — METOPROLOL TARTRATE 50 MG
25 TABLET ORAL
Refills: 0 | Status: DISCONTINUED | OUTPATIENT
Start: 2022-06-11 | End: 2022-06-13

## 2022-06-11 RX ADMIN — LIDOCAINE 1 PATCH: 4 CREAM TOPICAL at 14:15

## 2022-06-11 RX ADMIN — Medication 2.5 MILLIGRAM(S): at 05:52

## 2022-06-11 RX ADMIN — SODIUM CHLORIDE 75 MILLILITER(S): 9 INJECTION, SOLUTION INTRAVENOUS at 21:49

## 2022-06-11 RX ADMIN — Medication 1 APPLICATION(S): at 05:48

## 2022-06-11 RX ADMIN — APREMILAST 30 MILLIGRAM(S): KIT ORAL at 14:16

## 2022-06-11 RX ADMIN — Medication 25 MILLIGRAM(S): at 18:29

## 2022-06-11 RX ADMIN — ENOXAPARIN SODIUM 40 MILLIGRAM(S): 100 INJECTION SUBCUTANEOUS at 14:15

## 2022-06-11 RX ADMIN — Medication 100 MILLIGRAM(S): at 18:28

## 2022-06-11 RX ADMIN — Medication 1 MILLIGRAM(S): at 14:14

## 2022-06-11 RX ADMIN — Medication 1 APPLICATION(S): at 18:30

## 2022-06-11 RX ADMIN — Medication 20 MILLIEQUIVALENT(S): at 14:23

## 2022-06-11 RX ADMIN — ALBUTEROL 2.5 MILLIGRAM(S): 90 AEROSOL, METERED ORAL at 10:30

## 2022-06-11 RX ADMIN — ALBUTEROL 2.5 MILLIGRAM(S): 90 AEROSOL, METERED ORAL at 16:30

## 2022-06-11 RX ADMIN — ALBUTEROL 2.5 MILLIGRAM(S): 90 AEROSOL, METERED ORAL at 04:30

## 2022-06-11 RX ADMIN — ALBUTEROL 2.5 MILLIGRAM(S): 90 AEROSOL, METERED ORAL at 21:19

## 2022-06-11 RX ADMIN — Medication 5 MILLIGRAM(S): at 06:00

## 2022-06-11 NOTE — PROGRESS NOTE ADULT - ASSESSMENT
65 y/o male s/p fall down stairs while intoxicated with C1 L lateral arch fx and rib fx 6-8 with occult ptx, alcohol withdrawal, CIWA protocol, valium taper.      - Diet: pureed, no liquids per speech and swallow  - Continue CIWA  - Completion of valium taper today  - C-collar at all times  - Cont. PIC protocol  - Local wound care  - F/U PT/OT consult for dispo

## 2022-06-11 NOTE — PROGRESS NOTE ADULT - SUBJECTIVE AND OBJECTIVE BOX
TRAUMA SURGERY PROGRESS NOTE    Subjective: Patient examined at bedside this AM. Reports he is feeling well this morning. Agreed to replace his C-collar. Tolerating PO without N/V. No acute events overnight.     Objective:  Vital Signs  T(C): 36.1 (06-10 @ 23:37), Max: 37.2 (06-10 @ 05:28)  HR: 81 (06-10 @ 23:37) (79 - 88)  BP: 160/76 (06-10 @ 23:37) (147/74 - 170/78)  RR: 18 (06-10 @ 23:37) (18 - 18)  SpO2: 93% (06-10 @ 21:21) (93% - 96%)  06-09-22 @ 07:01  -  06-10-22 @ 07:00  --------------------------------------------------------  IN:  Total IN: 0 mL    OUT:    Voided (mL): 1300 mL  Total OUT: 1300 mL    Total NET: -1300 mL          Physical Exam:  General: alert and oriented x2, self and date  HEENT: C collar off, laying beside patient on bed  Resp: airway patent, respirations unlabored  Abdomen: soft, nontender, nondistended  Extremities: no edema, moving spontaneously, in soft restraints    Labs:                        9.6    4.84  )-----------( 231      ( 10 Isaiah 2022 06:09 )             28.3   06-10    138  |  102  |  9.1  ----------------------------<  101<H>  3.4<L>   |  21.0<L>  |  0.59    Ca    8.9      10 Isaiah 2022 06:09  Phos  2.8     06-10  Mg     1.6     06-10      CAPILLARY BLOOD GLUCOSE          Medications:   MEDICATIONS  (STANDING):  ALBUTerol    0.083% 2.5 milliGRAM(s) Nebulizer every 6 hours  apremilast 30 milliGRAM(s) Oral daily  BACItracin   Ointment 1 Application(s) Topical two times a day  dextrose 5% + lactated ringers. 1000 milliLiter(s) (75 mL/Hr) IV Continuous <Continuous>  diazepam  Injectable 2.5 milliGRAM(s) IV Push once  enoxaparin Injectable 40 milliGRAM(s) SubCutaneous every 24 hours  folic acid Injectable 1 milliGRAM(s) IV Push daily  lidocaine   4% Patch 1 Patch Transdermal daily  metoprolol tartrate Injectable 5 milliGRAM(s) IV Push every 6 hours  thiamine Injectable 100 milliGRAM(s) IV Push daily    MEDICATIONS  (PRN):  acetaminophen     Tablet .. 975 milliGRAM(s) Oral every 6 hours PRN Mild Pain (1 - 3)  ondansetron Injectable 4 milliGRAM(s) IV Push every 6 hours PRN Nausea and/or Vomiting

## 2022-06-11 NOTE — PROGRESS NOTE ADULT - ATTENDING COMMENTS
Agree with above assessment.  The patient was seen and examined by myself with the surgical PA and resident. The patient is without complaints or new events overnight.  The patient is trauma stable for discharge planning.  To maintain cervical collar at all times, to follow up with neurosurgery.

## 2022-06-12 PROCEDURE — 99231 SBSQ HOSP IP/OBS SF/LOW 25: CPT | Mod: GC

## 2022-06-12 RX ADMIN — Medication 100 MILLIGRAM(S): at 16:17

## 2022-06-12 RX ADMIN — Medication 975 MILLIGRAM(S): at 05:49

## 2022-06-12 RX ADMIN — ALBUTEROL 2.5 MILLIGRAM(S): 90 AEROSOL, METERED ORAL at 09:20

## 2022-06-12 RX ADMIN — Medication 25 MILLIGRAM(S): at 18:20

## 2022-06-12 RX ADMIN — Medication 1 MILLIGRAM(S): at 12:34

## 2022-06-12 RX ADMIN — Medication 1 APPLICATION(S): at 05:44

## 2022-06-12 RX ADMIN — Medication 25 MILLIGRAM(S): at 05:44

## 2022-06-12 RX ADMIN — APREMILAST 30 MILLIGRAM(S): KIT ORAL at 12:34

## 2022-06-12 RX ADMIN — LIDOCAINE 1 PATCH: 4 CREAM TOPICAL at 12:34

## 2022-06-12 RX ADMIN — LIDOCAINE 1 PATCH: 4 CREAM TOPICAL at 20:21

## 2022-06-12 RX ADMIN — LIDOCAINE 1 PATCH: 4 CREAM TOPICAL at 01:44

## 2022-06-12 RX ADMIN — Medication 1 APPLICATION(S): at 18:20

## 2022-06-12 RX ADMIN — ALBUTEROL 2.5 MILLIGRAM(S): 90 AEROSOL, METERED ORAL at 19:50

## 2022-06-12 RX ADMIN — ENOXAPARIN SODIUM 40 MILLIGRAM(S): 100 INJECTION SUBCUTANEOUS at 12:33

## 2022-06-12 NOTE — PROGRESS NOTE ADULT - SUBJECTIVE AND OBJECTIVE BOX
Trauma/ACS    SUBJECTIVE: Pt seen and examined on rounds with team. No acute events overnight.  Pt. more alert, cooperative.  CIWA 4-6, Wearing c-collar.  Placed on soft/pureed diet yesterday.      OBJECTIVE    PHYSICAL EXAM:   General: NAD, Lying in bed   Neuro: Awake and alert  Extremities: FROM, 5/5 strength  GI/Abd: Soft, NT/ND,       VITALS  T(C): 36.2 (06-11-22 @ 18:31), Max: 36.8 (06-11-22 @ 09:00)  HR: 95 (06-11-22 @ 18:31) (74 - 95)  BP: 117/79 (06-11-22 @ 18:31) (117/79 - 171/95)  RR: 18 (06-11-22 @ 18:31) (18 - 18)  SpO2: 94% (06-11-22 @ 18:31) (92% - 95%)  CAPILLARY BLOOD GLUCOSE          Is/Os    06-10 @ 07:01  -  06-11 @ 07:00  --------------------------------------------------------  IN:    dextrose 5% + lactated ringers: 900 mL  Total IN: 900 mL    OUT:    Voided (mL): 500 mL  Total OUT: 500 mL    Total NET: 400 mL      06-11 @ 07:01  -  06-12 @ 01:06  --------------------------------------------------------  IN:  Total IN: 0 mL    OUT:    Voided (mL): 250 mL  Total OUT: 250 mL    Total NET: -250 mL          MEDICATIONS (STANDING): ALBUTerol    0.083% 2.5 milliGRAM(s) Nebulizer every 6 hours  apremilast 30 milliGRAM(s) Oral daily  dextrose 5% + lactated ringers. 1000 milliLiter(s) IV Continuous <Continuous>  enoxaparin Injectable 40 milliGRAM(s) SubCutaneous every 24 hours  folic acid Injectable 1 milliGRAM(s) IV Push daily  metoprolol tartrate 25 milliGRAM(s) Oral two times a day  thiamine Injectable 100 milliGRAM(s) IV Push daily    MEDICATIONS (PRN):acetaminophen     Tablet .. 975 milliGRAM(s) Oral every 6 hours PRN Mild Pain (1 - 3)  ondansetron Injectable 4 milliGRAM(s) IV Push every 6 hours PRN Nausea and/or Vomiting      LABS  CBC (06-11 @ 07:41)                              9.8<L>                         5.90    )----------------(  272        63.2  % Neutrophils, 18.0  % Lymphocytes, ANC: 3.73                                29.6<L>    BMP (06-11 @ 07:41)             138     |  103     |  10.4  		Ca++ --      Ca 9.0                ---------------------------------( 92    		Mg 1.9                3.8     |  21.0<L>  |  0.69  			Ph --

## 2022-06-12 NOTE — PROGRESS NOTE ADULT - ATTENDING COMMENTS
Agree with above assessment.  The patient was seen and examined by myself with the surgical resident.  The patient is without new events overnight.  The patient is with episodes of confusion.  Cervical collar remains in place.  The patient is trauma stable for discharge planning.

## 2022-06-12 NOTE — PHYSICAL THERAPY INITIAL EVALUATION ADULT - ADDITIONAL COMMENTS
Pt questionable historian: Pt reports living with spouse and 2 adult children in a house with 3 CODY c rail then flight of stairs c rail to bedroom/ bathroom. Pt reports amb without DME and independent with functional mobility, ADLs, and IADLs. Pt reports he will be retired soon.

## 2022-06-12 NOTE — PHYSICAL THERAPY INITIAL EVALUATION ADULT - PERTINENT HX OF CURRENT PROBLEM, REHAB EVAL
67 yo male s/p fall down stairs with C1 left lateral arch fracture and L rib 6-8 fractures c occult ptx, and alcohol withdrawal

## 2022-06-12 NOTE — PROGRESS NOTE ADULT - ASSESSMENT
65 y/o male s/p fall down stairs while intoxicated sustaining C1 L lateral arch fx and rib fx 6-8 with occult ptx, alcohol withdrawal, CIWA protocol, valium taper completed with CIWA 4-6.  Passes swallow eval, on soft diet now.    - Continue CIWA  - C-collar immobilization at all times  - Continue PIC protocol  - Local wound care  - F/up PT/OT consultation  - DVT prophylaxis  - Dispo planning.

## 2022-06-13 ENCOUNTER — TRANSCRIPTION ENCOUNTER (OUTPATIENT)
Age: 67
End: 2022-06-13

## 2022-06-13 DIAGNOSIS — F10.132: ICD-10-CM

## 2022-06-13 DIAGNOSIS — F10.231 ALCOHOL DEPENDENCE WITH WITHDRAWAL DELIRIUM: ICD-10-CM

## 2022-06-13 LAB — AMMONIA BLD-MCNC: 18 UMOL/L — SIGNIFICANT CHANGE UP (ref 11–55)

## 2022-06-13 PROCEDURE — 70450 CT HEAD/BRAIN W/O DYE: CPT | Mod: 26

## 2022-06-13 PROCEDURE — 99232 SBSQ HOSP IP/OBS MODERATE 35: CPT | Mod: GC

## 2022-06-13 PROCEDURE — 99223 1ST HOSP IP/OBS HIGH 75: CPT

## 2022-06-13 RX ORDER — ALBUTEROL 90 UG/1
2.5 AEROSOL, METERED ORAL EVERY 6 HOURS
Refills: 0 | Status: DISCONTINUED | OUTPATIENT
Start: 2022-06-13 | End: 2022-06-15

## 2022-06-13 RX ORDER — THIAMINE MONONITRATE (VIT B1) 100 MG
100 TABLET ORAL EVERY 24 HOURS
Refills: 0 | Status: DISCONTINUED | OUTPATIENT
Start: 2022-06-13 | End: 2022-06-15

## 2022-06-13 RX ORDER — METOPROLOL TARTRATE 50 MG
25 TABLET ORAL
Refills: 0 | Status: DISCONTINUED | OUTPATIENT
Start: 2022-06-13 | End: 2022-06-15

## 2022-06-13 RX ORDER — FOLIC ACID 0.8 MG
1 TABLET ORAL EVERY 24 HOURS
Refills: 0 | Status: DISCONTINUED | OUTPATIENT
Start: 2022-06-13 | End: 2022-06-15

## 2022-06-13 RX ADMIN — Medication 100 MILLIGRAM(S): at 13:17

## 2022-06-13 RX ADMIN — APREMILAST 30 MILLIGRAM(S): KIT ORAL at 13:18

## 2022-06-13 RX ADMIN — Medication 25 MILLIGRAM(S): at 17:35

## 2022-06-13 RX ADMIN — LIDOCAINE 1 PATCH: 4 CREAM TOPICAL at 13:17

## 2022-06-13 RX ADMIN — LIDOCAINE 1 PATCH: 4 CREAM TOPICAL at 19:20

## 2022-06-13 RX ADMIN — Medication 25 MILLIGRAM(S): at 06:01

## 2022-06-13 RX ADMIN — Medication 1 APPLICATION(S): at 17:35

## 2022-06-13 RX ADMIN — ALBUTEROL 2.5 MILLIGRAM(S): 90 AEROSOL, METERED ORAL at 08:45

## 2022-06-13 RX ADMIN — SODIUM CHLORIDE 75 MILLILITER(S): 9 INJECTION, SOLUTION INTRAVENOUS at 06:01

## 2022-06-13 RX ADMIN — ALBUTEROL 2.5 MILLIGRAM(S): 90 AEROSOL, METERED ORAL at 15:49

## 2022-06-13 RX ADMIN — Medication 1 MILLIGRAM(S): at 13:17

## 2022-06-13 RX ADMIN — LIDOCAINE 1 PATCH: 4 CREAM TOPICAL at 00:19

## 2022-06-13 RX ADMIN — Medication 1 APPLICATION(S): at 06:01

## 2022-06-13 RX ADMIN — ENOXAPARIN SODIUM 40 MILLIGRAM(S): 100 INJECTION SUBCUTANEOUS at 10:43

## 2022-06-13 NOTE — ED BEHAVIORAL HEALTH ASSESSMENT NOTE - DESCRIPTION
Vital Signs Last 24 Hrs  T(C): 36.8 (13 Jun 2022 10:33), Max: 37.2 (12 Jun 2022 16:34)  T(F): 98.3 (13 Jun 2022 10:33), Max: 99 (12 Jun 2022 16:34)  HR: 80 (13 Jun 2022 10:33) (67 - 80)  BP: 117/74 (13 Jun 2022 10:33) (117/74 - 151/88)  BP(mean): --  RR: 17 (13 Jun 2022 10:33) (17 - 18)  SpO2: 96% (13 Jun 2022 10:33) (93% - 98%) P Patient is retired , worked full-time. He is now retired, he has two adult male children, no dependents

## 2022-06-13 NOTE — ED BEHAVIORAL HEALTH ASSESSMENT NOTE - RISK ASSESSMENT
Risk factors:  hx of substance abuse, multiple stressors, absence of outpatient follow-up, limited insight into symptoms, poor reactivity to stressors, difficulty expressing emotions.  Protective factors: Pt denies any active suicidal ideation/intent/plan, no hx of prior attempts, no hospitalizations, no self-harm behaviors, no family hx, has no acute affective or psychotic disorder, has responsibility to family and others, identifies reasons for living, future oriented, supportive social network or family,  motivation to participate in care.     Based on risk assessment evaluation, Pt DOES NOT appear to be at imminent risk of harm to self or others at this time. Low Acute Suicide Risk

## 2022-06-13 NOTE — PROGRESS NOTE ADULT - ASSESSMENT
65 y/o male s/p fall down stairs while intoxicated sustaining C1 L lateral arch fx and rib fx 6-8 with occult ptx, alcohol withdrawal, CIWA protocol, valium taper completed with CIWA 4-6.  Passes swallow eval, on soft diet now.    - Continue CIWA  - C-collar immobilization at all times  - Continue PIC protocol  - Local wound care  - PT recommending RANJAN  - DVT prophylaxis  - Dispo planning.

## 2022-06-13 NOTE — DISCHARGE NOTE PROVIDER - NSDCCPCAREPLAN_GEN_ALL_CORE_FT
PRINCIPAL DISCHARGE DIAGNOSIS  Diagnosis: Closed C1 fracture  Assessment and Plan of Treatment: Follow up: Please call and make an appointment with neurosurgeon Dr. Ambrosio 10-14 days after discharge. Also, please call and make an appointment with your primary care physician as per your usual schedule.   Activity: Cervical collar to be worn at all times.  Diet: Pureed diet with moderately thickened liquids.   Medications: Please resume home medications as previously prescribed. Tylenol and lidocaine patches as needed, for pain relief.   Patient is advised to RETURN TO THE EMERGENCY DEPARTMENT for any of the following - worsening pain, fever/chills, nausea/vomiting, altered mental status, chest pain, shortness of breath, or any other new / worsening symptom.      SECONDARY DISCHARGE DIAGNOSES  Diagnosis: Fracture of one rib, closed  Assessment and Plan of Treatment: Continue pain medications as prescribed, incentive spirometer use and cough / deep breathing exercises for pulm toiletting. Albuterol nebulizer as needed for SOB / wheezing.    Diagnosis: Alcohol intoxication  Assessment and Plan of Treatment: Continue thiamine and folate supplements & f/u with your primary care provider after discharge.

## 2022-06-13 NOTE — DISCHARGE NOTE PROVIDER - HOSPITAL COURSE
Admission HPI: Patient is a 65 yo M s/p fall from about 15 stairs while intoxicated, trauma B activation. Reports pain in his R wrist where there is an active bleeding laceration. No other complaints at this time. Confused, GCS 14, hemodynamically stable. Denies taking any blood thinners at home. Denies chest pain, SOB, dyspnea, abdominal pain, N/V, dizziness.     Hospital Course: CT imaging showed C1 arch fracture, left 6-8 rib fxs, and occult pneumothorax. Patient also had laceration to R wrist which was repaired at bedside. Patient was admitted to the trauma service. Placed on rib fx protocol (PIC protocol) w/ emphasis on pulm toiletting & multimodal analgesia. CTA neck showed no BCVI. Kept in cervical collar - neurosx consulted for C1 fx. MRI obtained showing no ligamentous injury, epidural hematoma. Plan is for patient to remain in c-collar @ all times. Downgraded to surgical floors on 6/9. Hospital course complicated by EtOH withdrawal. Patient completed valium taper. Required 1:1 constant observation for impulsivity and delirium. Psychiatry consulted for delirium, hallucinations & varying levels of alertness - felt these are likely protracted symptoms of etoh withdrawal given severity of alcohol use. Kept on thiamine & folic acid. CT head without organic cause of AMS. Patient was able to be taken off 1:1 constant observation on 6/12 and has remained without need for PRN medication. Patient was evaluated by SLP due to concern for dysphagia given cervical fx & c-collar. Currently tolerating a pureed diet w/ moderately thickened liquids, as per their recommendations. He was evaluated by PT who recommended R[facility] upon discharge. Pt is tolerating diet, pain controlled, & stable for d/c to [facility] with outpatient follow-up.    Patient is advised to RETURN TO THE EMERGENCY DEPARTMENT for any of the following - worsening pain, fever/chills, nausea/vomiting, altered mental status, chest pain, shortness of breath, or any other new / worsening symptom. Admission HPI: Patient is a 65 yo M s/p fall from about 15 stairs while intoxicated, trauma B activation. Reports pain in his R wrist where there is an active bleeding laceration. No other complaints at this time. Confused, GCS 14, hemodynamically stable. Denies taking any blood thinners at home. Denies chest pain, SOB, dyspnea, abdominal pain, N/V, dizziness.     Hospital Course: CT imaging showed C1 arch fracture, left 6-8 rib fxs, and occult pneumothorax. Patient also had laceration to R wrist which was repaired at bedside. Patient was admitted to the trauma service. Placed on rib fx protocol (PIC protocol) w/ emphasis on pulm toiletting & multimodal analgesia. CTA neck showed no BCVI. Kept in cervical collar - neurosx consulted for C1 fx. MRI obtained showing no ligamentous injury, epidural hematoma. Plan is for patient to remain in c-collar @ all times. Downgraded to surgical floors on 6/9. PTX remained occult - no deteroration of respiratory status during admission. Hospital course complicated by EtOH withdrawal. Patient completed valium taper. Required 1:1 constant observation for impulsivity and delirium. Psychiatry consulted for delirium, hallucinations & varying levels of alertness - felt these are likely protracted symptoms of etoh withdrawal given severity of alcohol use. Kept on thiamine & folic acid. CT head without organic cause of AMS. Patient was able to be taken off 1:1 constant observation on 6/12 and has remained without need for PRN medication. Patient was evaluated by SLP due to concern for dysphagia given cervical fx & c-collar. Currently tolerating a pureed diet w/ moderately thickened liquids, as per their recommendations. He was evaluated by PT who recommended RANJAN upon discharge. Pt is tolerating diet, pain controlled, & stable for d/c to [facility] with outpatient follow-up.    Patient is advised to RETURN TO THE EMERGENCY DEPARTMENT for any of the following - worsening pain, fever/chills, nausea/vomiting, altered mental status, chest pain, shortness of breath, or any other new / worsening symptom.

## 2022-06-13 NOTE — DISCHARGE NOTE PROVIDER - NSDCFUSCHEDAPPT_GEN_ALL_CORE_FT
[facility]  [facility]  Scheduled Appointment: 08/04/2022     [facility]  [facility]  Scheduled Appointment: 08/04/2022    [facility]  [address]  Scheduled Appointment: 09/12/2022

## 2022-06-13 NOTE — PROGRESS NOTE ADULT - SUBJECTIVE AND OBJECTIVE BOX
Trauma/ACS    SUBJECTIVE: Pt seen and examined on rounds.  Pt. more lucid today.  Receiving bed bath at time of rounds. No acute events overnight.        OBJECTIVE    PHYSICAL EXAM:   General: NAD, Lying in bed   Neuro: Awake and alert  Extremities: FROM, 5/5 strength  GI/Abd: Soft, NT/ND,       VITALS  T(C): 36.7 (06-12-22 @ 22:06), Max: 37.2 (06-12-22 @ 16:34)  HR: 74 (06-12-22 @ 22:06) (65 - 82)  BP: 148/90 (06-12-22 @ 22:06) (147/83 - 157/94)  RR: 18 (06-12-22 @ 22:06) (18 - 18)  SpO2: 93% (06-12-22 @ 22:06) (92% - 98%)  CAPILLARY BLOOD GLUCOSE          Is/Os    06-11 @ 07:01  -  06-12 @ 07:00  --------------------------------------------------------  IN:  Total IN: 0 mL    OUT:    Voided (mL): 250 mL  Total OUT: 250 mL    Total NET: -250 mL      06-12 @ 07:01  -  06-13 @ 01:32  --------------------------------------------------------  IN:  Total IN: 0 mL    OUT:    Voided (mL): 350 mL  Total OUT: 350 mL    Total NET: -350 mL          MEDICATIONS (STANDING): ALBUTerol    0.083% 2.5 milliGRAM(s) Nebulizer every 6 hours  apremilast 30 milliGRAM(s) Oral daily  dextrose 5% + lactated ringers. 1000 milliLiter(s) IV Continuous <Continuous>  enoxaparin Injectable 40 milliGRAM(s) SubCutaneous every 24 hours  folic acid Injectable 1 milliGRAM(s) IV Push daily  metoprolol tartrate 25 milliGRAM(s) Oral two times a day  thiamine Injectable 100 milliGRAM(s) IV Push daily    MEDICATIONS (PRN):acetaminophen     Tablet .. 975 milliGRAM(s) Oral every 6 hours PRN Mild Pain (1 - 3)  ondansetron Injectable 4 milliGRAM(s) IV Push every 6 hours PRN Nausea and/or Vomiting      LABS

## 2022-06-13 NOTE — DISCHARGE NOTE PROVIDER - NSDCMRMEDTOKEN_GEN_ALL_CORE_FT
doxycycline 20 mg oral tablet: 1 tab(s) orally every 12 hours  ergocalciferol 1.25 mg (50,000 intl units) oral capsule: 1 cap(s) orally once a week  folic acid 1 mg oral tablet: 1 tab(s) orally once a day  Mobic 15 mg oral tablet: 1 tab(s) orally once a day  Otezla 30 mg oral tablet: 1 tab(s) orally once a day  sulfaSALAzine 500 mg oral tablet: 1 tab(s) orally 2 times a day   acetaminophen 325 mg oral tablet: 3 tab(s) orally every 6 hours, As needed, Mild Pain (1 - 3)  albuterol 2.5 mg/3 mL (0.083%) inhalation solution: 2.5 milligram(s) inhaled every 6 hours, As Needed for SOB / wheezing  bacitracin 500 units/g topical ointment: 1 application topically 2 times a day  ergocalciferol 1.25 mg (50,000 intl units) oral capsule: 1 cap(s) orally once a week  folic acid 1 mg oral tablet: 1 tab(s) orally once a day  lidocaine 4% topical film: Apply topically to affected area once a day  metoprolol tartrate 25 mg oral tablet: 1 tab(s) orally 2 times a day  Mobic 15 mg oral tablet: 1 tab(s) orally once a day  Otezla 30 mg oral tablet: 1 tab(s) orally once a day  thiamine 100 mg oral tablet: 1 tab(s) orally every 24 hours

## 2022-06-13 NOTE — ED BEHAVIORAL HEALTH ASSESSMENT NOTE - SUMMARY
Patient is a 65 yo M, , retired, domiciled. No PPHX, ETOH abuse chronic, no past psychiatric hospitalizations; no hx of suicide attempt; no legal hx; no hx of violence. PPMHX of Psoriatic arthritis. Patient presented to [facility] brought in by ambulance EMS, s/p fall from about 15 stairs while intoxicated, trauma B activation. Admitted for C1 L lateral arch fx and rib fx 6-8 with occult ptx.   Psychiatry consulted for hallucinations.      Patient evaluated, AOx2, he is a poor historian, calm and cooperative. Patient with increased anxiety and depression in the context of recent hospitalization due to medical health. No SIIP, no HIIP. No subject or manic features were reported. No overt psychosis. Per history patient with vasiclating level of alertness over the past several days with periods of increased confusion and combativeness. He completed ETON librium taper, and remains on CIWA for evaluation of withdrawal symptoms. He will likely experience protracted ETOH withdrawal due to severity of ETOH use, self reported chronic ETOH abuse for the last 50 years. Motivational interviewing was used to assess readiness to discontinue use. Patient expresses willingness to abstain from alcohol. Patient would likely benefit from substance rehabilitation when medically stable. He does not meet criteria for inpatient psych, hallucinations likely secondary to protected Delirium due to chronic AUD. Patient without behavioral disturbance at this time, he appears in no distress, no use of PRNs x 24 hours, would not recommend use of antipsychotics at this time.  Recommended social work to follow up for outpatient community service referral.    Plan  Safety: at the discretion of the medical team for impulsivity and fall risk  Social Work for community service referral for substance rehabilitation  Medication:   Increase Thiamine 500mg IV daily while in the hospital  Discharge home on 100mg orally

## 2022-06-13 NOTE — ED BEHAVIORAL HEALTH ASSESSMENT NOTE - NS ED [facility] ED COURSE FOUR POINT RESTRAINTS IN ED YN
----- Message from Mirella Lundy MD sent at 11/12/2018  1:46 PM EST -----  Nl gallbladder scan-if still with sx rec formal gi eval
Patient was notified of test results and recommendations 
No

## 2022-06-13 NOTE — ED BEHAVIORAL HEALTH ASSESSMENT NOTE - CURRENT MEDICATION
MEDICATIONS  (STANDING):  ALBUTerol    0.083% 2.5 milliGRAM(s) Nebulizer every 6 hours  apremilast 30 milliGRAM(s) Oral daily  BACItracin   Ointment 1 Application(s) Topical two times a day  enoxaparin Injectable 40 milliGRAM(s) SubCutaneous every 24 hours  folic acid 1 milliGRAM(s) Oral every 24 hours  lidocaine   4% Patch 1 Patch Transdermal daily  metoprolol tartrate 25 milliGRAM(s) Oral two times a day  thiamine 100 milliGRAM(s) Oral every 24 hours

## 2022-06-13 NOTE — PROGRESS NOTE ADULT - ATTENDING COMMENTS
Patient seen and examined  agree with note and exam  clinically stabl  patient clear from trauma standpoint, plan for RANJAN

## 2022-06-13 NOTE — DISCHARGE NOTE PROVIDER - CARE PROVIDER_API CALL
Jose Jensen; PhD)  Neurosurgery  [address] [place] [address]  Phone: [phone]  Fax: [phone]  Follow Up Time:

## 2022-06-13 NOTE — ED BEHAVIORAL HEALTH ASSESSMENT NOTE - HPI (INCLUDE ILLNESS QUALITY, SEVERITY, DURATION, TIMING, CONTEXT, MODIFYING FACTORS, ASSOCIATED SIGNS AND SYMPTOMS)
Patient is a 67 yo M, , retired, domiciled. No PPHX, ETOH abuse chronic, no past psychiatric hospitalizations; no hx of suicide attempt; no legal hx; no hx of violence. PPMHX of Psoriatic arthritis. Patient presented to [facility] brought in by ambulance EMS, s/p fall from about 15 stairs while intoxicated, trauma B activation. Admitted for C1 L lateral arch fx and rib fx 6-8 with occult ptx.   Psychiatry consulted for hallucinations.    Patient was seen and evaluated at bedside, presents calm , cooperative, aox2, he is a poor historian, fairly-related, and actively engaged. Wife at bedside at time of eval. Patient reports hx of ETOH use. He estimates use as 5-10 drinks daily of scotch or vodka since has become retired. He reports that he has never had a period of sobriety, so he would not be able to determine if he has had any withdrawal symptoms. He reports that he has been told in the past that his ETOH use was problematic by his doctors and his family, despite that he has not attempted to discontinue use. He reports that he episode where he saw a monster with green eyes coming out of the ceiling, he thinks this occurred two days ago. He admits to feeling increasingly confused over the past week.     He admits to some low level depressive mood symptoms in the context of recent hospitalization. He denies hopelessness and suicidality. Patient admits to anxiety regarding his health, with increased worries over his medical illness,  no acute panic episodes. Patient reports sleep as poor while in hospital with poor PO intake due to restrictions due to medical diagnosis. No reported manic symptoms including elevated mood, increased irritability, mood lability, distractibility, grandiosity, pressured speech, increase in goal-directed activity, or decreased need for sleep.  He denies AVH at this time, no delusions or paranoia elicited. He does not appear internally preoccupied.   Wife reports that the above is true, he has no significant PPHX, ETOH abuse Chronic, he has increase ETOH consumption and use since snf. She states, he has said he has seen things and people that were not in the room while she was present.     Per nurse that patient has been calm and cooperative today, no use of PRNs. He has completed the CIWA taper for ETOH withdrawal.

## 2022-06-14 ENCOUNTER — APPOINTMENT (OUTPATIENT)
Dept: FAMILY MEDICINE | Facility: CLINIC | Age: 67
End: 2022-06-14

## 2022-06-14 LAB — SARS-COV-2 RNA SPEC QL NAA+PROBE: SIGNIFICANT CHANGE UP

## 2022-06-14 PROCEDURE — 99232 SBSQ HOSP IP/OBS MODERATE 35: CPT | Mod: GC

## 2022-06-14 RX ORDER — ALBUTEROL 90 UG/1
2.5 AEROSOL, METERED ORAL
Qty: 0 | Refills: 0 | DISCHARGE
Start: 2022-06-14

## 2022-06-14 RX ORDER — METOPROLOL TARTRATE 50 MG
1 TABLET ORAL
Qty: 0 | Refills: 0 | DISCHARGE
Start: 2022-06-14

## 2022-06-14 RX ORDER — ACETAMINOPHEN 500 MG
3 TABLET ORAL
Qty: 0 | Refills: 0 | DISCHARGE
Start: 2022-06-14

## 2022-06-14 RX ORDER — BACITRACIN ZINC 500 UNIT/G
1 OINTMENT IN PACKET (EA) TOPICAL
Qty: 0 | Refills: 0 | DISCHARGE
Start: 2022-06-14

## 2022-06-14 RX ORDER — THIAMINE MONONITRATE (VIT B1) 100 MG
1 TABLET ORAL
Qty: 0 | Refills: 0 | DISCHARGE
Start: 2022-06-14

## 2022-06-14 RX ORDER — LIDOCAINE 4 G/100G
1 CREAM TOPICAL
Qty: 0 | Refills: 0 | DISCHARGE
Start: 2022-06-14

## 2022-06-14 RX ORDER — SULFASALAZINE 500 MG
1 TABLET ORAL
Qty: 0 | Refills: 0 | DISCHARGE

## 2022-06-14 RX ADMIN — ENOXAPARIN SODIUM 40 MILLIGRAM(S): 100 INJECTION SUBCUTANEOUS at 08:41

## 2022-06-14 RX ADMIN — Medication 25 MILLIGRAM(S): at 06:16

## 2022-06-14 RX ADMIN — Medication 1 MILLIGRAM(S): at 13:06

## 2022-06-14 RX ADMIN — Medication 1 APPLICATION(S): at 06:21

## 2022-06-14 RX ADMIN — Medication 1 APPLICATION(S): at 17:58

## 2022-06-14 RX ADMIN — LIDOCAINE 1 PATCH: 4 CREAM TOPICAL at 19:45

## 2022-06-14 RX ADMIN — Medication 25 MILLIGRAM(S): at 17:58

## 2022-06-14 RX ADMIN — Medication 100 MILLIGRAM(S): at 13:06

## 2022-06-14 RX ADMIN — LIDOCAINE 1 PATCH: 4 CREAM TOPICAL at 13:06

## 2022-06-14 RX ADMIN — APREMILAST 30 MILLIGRAM(S): KIT ORAL at 17:59

## 2022-06-14 NOTE — CHART NOTE - NSCHARTNOTEFT_GEN_A_CORE
SICU TRANSFER NOTE  -----------------------------  ICU Admission Date: 6/2/2022  Transfer Date: 06-09-22 @ 15:15    Admission Diagnosis: C1 left anterior arch fracture, Left 6-8 rib fractures, Left occult pneumothorax, lacerations RUE    Active Problems/injuries: C1 left anterior arch fracture, Left 6-8 rib fractures, laceration RUE, ETOH withdrawal    Procedures: None    Consultants:    [x ]Neurosurgery  [x] Ortho       [x] Weight Bearing Status: Full WB  [x] PT / OT  [x} Speech / Swallow      Medications  acetaminophen     Tablet .. 975 milliGRAM(s) Oral every 6 hours PRN  ALBUTerol    0.083% 2.5 milliGRAM(s) Nebulizer every 6 hours  apremilast 30 milliGRAM(s) Oral daily  BACItracin   Ointment 1 Application(s) Topical two times a day  chlorhexidine 2% Cloths 1 Application(s) Topical daily  dextrose 5% + lactated ringers. 1000 milliLiter(s) IV Continuous <Continuous>  diazepam  Injectable 2.5 milliGRAM(s) IV Push every 8 hours  enoxaparin Injectable 40 milliGRAM(s) SubCutaneous every 24 hours  folic acid Injectable 1 milliGRAM(s) IV Push daily  HYDROmorphone  Injectable 0.5 milliGRAM(s) IV Push every 4 hours PRN  lidocaine   4% Patch 1 Patch Transdermal daily  ondansetron Injectable 4 milliGRAM(s) IV Push every 6 hours PRN  thiamine Injectable 100 milliGRAM(s) IV Push daily      [ ] I attest I have reviewed and reconciled all medications prior to transfer    IV Fluids  sodium chloride 0.9%: 1000 milliLiter(s)      with thiamine additive 100 milliGRAM(s)      with folic acid additive 1 milliGRAM(s)      with multivitamin additive 10 milliLiter(s), infuse at 100 mL/Hr; Stop After 1 Doses  sodium chloride 0.9%: 1000 milliLiter(s)      with thiamine additive 100 milliGRAM(s)      with folic acid additive 10 milliGRAM(s)      with multivitamin additive 1000 milliLiter(s), infuse at 100 mL/Hr  sodium chloride 0.9% Bolus:   250 milliLiter(s), IV Bolus, once, infuse over 60 Minute(s), Stop After 1 Doses  Special Instructions: Peripheral Line 1    Indication: NPO    Antibiotics: Bacitracin ointment bid to lacerations      I have discussed this case with Dr. Curtis Steward with ACS / Trauma upon transfer and all questions regarding ICU course were answered.  The following items are to be followed up:    - Continue CIWA  - Valium taper ordered  - C-collar immobilization at all times  - Continue PIC protocol  - continue 1:1 constant observation for impulsivity  - Local wound care  - F/up PT/OT consultation  - F/up Speech / swallow consultation.  Consider NGT for nutrition if remains NPO  - DVT prophylaxis  - Dispo planning
SICU TRANSFER NOTE  -----------------------------  ICU Admission Date: 6/2/22  Transfer Date: 06-04-22 @ 17:55    Admission Diagnosis: C1 Left Lateral fx, Left 6-8 rib fx, occult ptx    Active Problems/injuries: C1 left lateral fx, left 6-8 rib fx    Procedures: None    Consultants:  [ ] Cardiology  [ ] Endocrine  [ ] Infectious Disease  [ ] Medicine  [X] Neurosurgery  [ ] Ortho       [ ] Weight Bearing Status:  [ ] Palliative       [ ] Advanced Directives:    [ ] Physical Medicine and Rehab       [ ] Disposition :   [ ] Plastics  [ ] Pulmonary    Medications  acetaminophen     Tablet .. 975 milliGRAM(s) Oral every 6 hours PRN  chlordiazePOXIDE 50 milliGRAM(s) Oral every 8 hours  chlorhexidine 2% Cloths 1 Application(s) Topical daily  folic acid 1 milliGRAM(s) Oral daily  gabapentin 100 milliGRAM(s) Oral every 8 hours  HYDROmorphone  Injectable 0.5 milliGRAM(s) IV Push every 4 hours PRN  lidocaine   4% Patch 1 Patch Transdermal daily  melatonin 5 milliGRAM(s) Oral at bedtime  ondansetron Injectable 4 milliGRAM(s) IV Push every 6 hours PRN  oxyCODONE    IR 5 milliGRAM(s) Oral every 6 hours PRN  thiamine 100 milliGRAM(s) Oral daily      [X] I attest I have reviewed and reconciled all medications prior to transfer      Antibiotics: None      I have discussed this case with Adelina Gray MD Resident upon transfer and all questions regarding ICU course were answered.  The following items are to be followed up:  1. f/u Neurosurgery recs...C-collar at all times. To be fitted for [place] collar by orthotist  2. PT  3. CTA with no BCVI  4. MRI showed no ligamentous injury or epidural hematoma...however NSX recommending additional MRI be performed with thin cuts  5. Regular diet  6. Quick librium taper for ETOH withdrawal.  6. dispo planning...likely 6/5 if cleared by PT
Source: Patient [ x]  Family [ ]   other [ x]EMR, rounds    Current Diet: NPO     Source for PO intake [x ] Patient [ ] family [ ] chart [ ] staff [ ] other Pt states po intake is normally good    Enteral /Parenteral Nutrition:     Current Weight: 174.1# 6/6  generalized 1 +    % Weight Change     Pertinent Medications: MEDICATIONS  (STANDING):  ALBUTerol    0.083% 2.5 milliGRAM(s) Nebulizer every 6 hours  apremilast 30 milliGRAM(s) Oral daily  BACItracin   Ointment 1 Application(s) Topical two times a day  dextrose 5% + lactated ringers. 1000 milliLiter(s) (75 mL/Hr) IV Continuous <Continuous>  diazepam  Injectable 2.5 milliGRAM(s) IV Push <User Schedule>  enoxaparin Injectable 40 milliGRAM(s) SubCutaneous every 24 hours  folic acid Injectable 1 milliGRAM(s) IV Push daily  lidocaine   4% Patch 1 Patch Transdermal daily  metoprolol tartrate Injectable 5 milliGRAM(s) IV Push every 6 hours  thiamine Injectable 100 milliGRAM(s) IV Push daily    MEDICATIONS  (PRN):  acetaminophen     Tablet .. 975 milliGRAM(s) Oral every 6 hours PRN Mild Pain (1 - 3)  ondansetron Injectable 4 milliGRAM(s) IV Push every 6 hours PRN Nausea and/or Vomiting    Pertinent Labs: CBC Full  -  ( 10 Isaiah 2022 06:09 )  WBC Count : 4.84 K/uL  RBC Count : 2.90 M/uL  Hemoglobin : 9.6 g/dL  Hematocrit : 28.3 %  Platelet Count - Automated : 231 K/uL  Mean Cell Volume : 97.6 fl  Mean Cell Hemoglobin : 33.1 pg  Mean Cell Hemoglobin Concentration : 33.9 gm/dL  Auto Neutrophil # : 3.05 K/uL  Auto Lymphocyte # : 0.79 K/uL  Auto Monocyte # : 0.71 K/uL  Auto Eosinophil # : 0.22 K/uL  Auto Basophil # : 0.03 K/uL  Auto Neutrophil % : 63.1 %  Auto Lymphocyte % : 16.3 %  Auto Monocyte % : 14.7 %  Auto Eosinophil % : 4.5 %  Auto Basophil % : 0.6 %      06-10 Na138 mmol/L Glu 101 mg/dL<H> K+ 3.4 mmol/L<L> Cr  0.59 mg/dL BUN 9.1 mg/dL Phos 2.8 mg/dL Alb n/a   PAB n/a           Skin:     Nutrition focused physical exam conducted - found signs of malnutrition [ ]absent [ ]present    Subcutaneous fat loss: [ ] Orbital fat pads region, [ ]Buccal fat region, [ ]Triceps region,  [ ]Ribs region    Muscle wasting: [ ]Temples region, [ ]Clavicle region, [ ]Shoulder region, [ ]Scapula region, [ ]Interosseous region,  [ ]thigh region, [ ]Calf region    Estimated Needs:   [ x] no change since previous assessment  [ ] recalculated:     Current Nutrition Diagnosis: Increased Nutrient Needs...  related to increased physiological demand for nutrient  as evidenced by C1 L lateral arch fx and rib fx 6-8 and withdrawal. ST recommending puree with no liquids.      Recommendations: Ensure Pudding TID to optimize po intake and provide an additional 170 kcal, 4g protein per serving   Rec thiamine, MVI, folic acid    Monitoring and Evaluation:   [x ] PO intake [x ] Tolerance to diet prescription [X] Weights  [X] Follow up per protocol [X] Labs:
Patifrancisco is 66 M who fall fro stairway intoxicated with ETOH. Sustained cervical spine and rib injuries. CIWA protocol in place on admission with withdrawal symptoms in ICU treated with benzodiazepines. Patient downgraded 6/9.     Patient interactiving with examiner. Awake spontaneously, c-collar in place. Hypertensive 171/105 (121) HR 88 and  17. Breathing RA. Left thorax pain 5/10, PIC score: ICU Q 1 hour until score is greater than 6, then Q 2 hours  Floor Q 4 hours until score greater than 6 then Q 6 hours  Contact provider for score Less than 5     OOB to chair within 12 hours  OOB to chair with all meals within 24 hours of admit  Ambulate TID within 24 hours of admit  Head of bed 30 degrees or higher  Encourage Cough, Deep breathing and incentive spirometry cough moderate intensity and IR 2000cc.  Abdomen soft, no distended no peritoneal signs.  Extremities palpable pulses universally, extremities mobile.  Neuro: conservative, some confusion.     PLAN:    6/9 Failed swallowing  1:1 & restrained  1. Neurochecks q8  2. PIC protocol  3. C colar all times  4. CIWA  5. Chest PT  6. FU speech, PT?OT  7. Pain control  8.Pending dispo
Sutures removed from right wrist laceration. Wound remains well approximated without surrounding erythema or drainage. Steri strips placed. Pt tolerated well.
Tertiary Trauma Survey (TTS)    Date of TTS: 06-03-22 @ 14:13                             Admit Date: 06-02-22 @ 22:31        Subjective / 24 hour events: Admitted to ICU for neuro monitoring of C1 fracture, displaying early signs of EtOH withdrawal. CTA to r/o BCVI pending and MRI to r/o ligamentous injury pending. CT RUE ordered to r/o occult fx.    Vital Signs Last 24 Hrs  T(C): 36.9 (03 Jun 2022 11:37), Max: 37 (03 Jun 2022 07:34)  T(F): 98.4 (03 Jun 2022 11:37), Max: 98.6 (03 Jun 2022 07:34)  HR: 82 (03 Jun 2022 13:00) (68 - 97)  BP: 142/90 (03 Jun 2022 13:00) (105/66 - 186/88)  BP(mean): 105 (03 Jun 2022 13:00) (76 - 117)  RR: 13 (03 Jun 2022 13:00) (11 - 19)  SpO2: 97% (02 Jun 2022 23:35) (97% - 98%)    Physical Exam:    Neuro: [ ] non focal neurological exam [ ] Focal Neurological deficits noted to be: Moves all extremities, strength 5/5 through out tremulous at times    HEENT: [ ] Normo-cephalic/atraumatic  [ ] abnormalities noted to be: R eyebrow lac sutured     Pulm/Chest:  [ x ] CTA b/l  [ ] chest wall non tender  [ ] abnormalities noted to be:    Cardiac: [ x ] S1S2, sinus rhythm  [ ] abnormalities noted to be:     GI / Abdomen: [ x ] Soft, non-tender, non-distended [ ] abnormalities noted to be:    Musculoskeletal / Extremities: [ ]normal active ROM  [ ]  abnormalities noted to be: R wrist/forearm with abrasion, cleaned and dressed, + tenderness    Integumentary: [ ] Skin intact [ ] Warm [ ] Dry [ x]abnormalities noted to be: as above    Vascular: [ x ] 2+ palpable distal pulses  [ ] HAILEY:       [ ] abnormalities noted to be:      List Injuries Identified to Date: C1 fx, multiple rib fractures    List Operative and Interventional Radiological Procedures:     Consults (Date):  [ x ] Neurosurgery   [  ] Orthopedics  [  ] Plastics  [  ] Urology  [  ] PM&R  [  ] Social Work    RADIOLOGICAL FINDINGS REVIEW:  CXR: clear lungs, sub q emphysema, rib fx 6-8    Pelvis Films: no fx    Extremity Films: ? chronic R distal radius fx, degerative disease    Head CT: no ICH    C-Spine CT: C1 lateral fx    CT Torso: 1. Multifocal acute fractures involving the left sixth through eighth   ribs, with associated small left-sided hemopneumothorax and possible   pulmonary laceration left lower lobe.  2. The left sciatic nerve is enlarged with surrounding fat stranding,   suspicious for acute injury and perineural hemorrhage.  3. Appearance of the bladder and urinary tract could be related to   underlying chronic bladder outlet obstruction, however correlation with   urinalysis is advised to exclude cystitis and possible ascending urinary   tract infection.      Other:

## 2022-06-14 NOTE — PROGRESS NOTE ADULT - SUBJECTIVE AND OBJECTIVE BOX
INTERVAL HPI/OVERNIGHT EVENTS:    Patient evaluated at bedside. No acute distress. No acute events overnight.    MEDICATIONS  (STANDING):  apremilast 30 milliGRAM(s) Oral daily  BACItracin   Ointment 1 Application(s) Topical two times a day  enoxaparin Injectable 40 milliGRAM(s) SubCutaneous every 24 hours  folic acid 1 milliGRAM(s) Oral every 24 hours  lidocaine   4% Patch 1 Patch Transdermal daily  metoprolol tartrate 25 milliGRAM(s) Oral two times a day  thiamine 100 milliGRAM(s) Oral every 24 hours    MEDICATIONS  (PRN):  acetaminophen     Tablet .. 975 milliGRAM(s) Oral every 6 hours PRN Mild Pain (1 - 3)  ALBUTerol    0.083% 2.5 milliGRAM(s) Nebulizer every 6 hours PRN Shortness of Breath and/or Wheezing  ondansetron Injectable 4 milliGRAM(s) IV Push every 6 hours PRN Nausea and/or Vomiting      Vital Signs Last 24 Hrs  T(C): 37 (13 Jun 2022 16:26), Max: 37 (13 Jun 2022 16:26)  T(F): 98.6 (13 Jun 2022 16:26), Max: 98.6 (13 Jun 2022 16:26)  HR: 80 (13 Jun 2022 16:26) (67 - 80)  BP: 130/75 (13 Jun 2022 16:26) (117/74 - 130/75)  BP(mean): --  RR: 18 (13 Jun 2022 16:26) (17 - 18)  SpO2: 95% (13 Jun 2022 16:26) (94% - 96%)      PHYSICAL EXAM:   General: NAD, Lying in bed   Neuro: Awake and alert  Extremities: FROM, 5/5 strength  GI/Abd: Soft, NT/ND,    I&O's Detail    12 Jun 2022 07:01  -  13 Jun 2022 07:00  --------------------------------------------------------  IN:  Total IN: 0 mL    OUT:    Voided (mL): 350 mL  Total OUT: 350 mL    Total NET: -350 mL      13 Jun 2022 07:01 - 14 Jun 2022 01:09  --------------------------------------------------------  IN:  Total IN: 0 mL    OUT:    Voided (mL): 100 mL  Total OUT: 100 mL    Total NET: -100 mL          LABS:                RADIOLOGY & ADDITIONAL STUDIES:

## 2022-06-14 NOTE — CHART NOTE - NSCHARTNOTESELECT_GEN_ALL_CORE
Tertiary Survey/Event Note
Trauma downgrade/Event Note
suture removal/Event Note
Nutrition Services
SICU downgrade/Event Note
Transfer Note

## 2022-06-14 NOTE — PROGRESS NOTE ADULT - SUBJECTIVE AND OBJECTIVE BOX
Dandy was awake and alert in bed as I fit him with an Aspen Multipost cervical brace. He was instructed on donning and washing and changing liners as needed. Brace should be wiped clean daily. Additional liners and contact info were provided. Velcro straps were marked to proper tightness to keep brace in position under his chin.   [facility]

## 2022-06-14 NOTE — PROGRESS NOTE ADULT - ATTENDING COMMENTS
patient seen and examined  agree with note and exam  no acute overnight issues  collar loose fitted, orthotics team consulted  tolerating diet  on dvt prophylaxis  awaiting placement

## 2022-06-15 ENCOUNTER — TRANSCRIPTION ENCOUNTER (OUTPATIENT)
Age: 67
End: 2022-06-15

## 2022-06-15 VITALS
SYSTOLIC BLOOD PRESSURE: 109 MMHG | DIASTOLIC BLOOD PRESSURE: 75 MMHG | OXYGEN SATURATION: 95 % | RESPIRATION RATE: 18 BRPM | HEART RATE: 85 BPM

## 2022-06-15 PROCEDURE — 87641 MR-STAPH DNA AMP PROBE: CPT

## 2022-06-15 PROCEDURE — 85025 COMPLETE CBC W/AUTO DIFF WBC: CPT

## 2022-06-15 PROCEDURE — 84295 ASSAY OF SERUM SODIUM: CPT

## 2022-06-15 PROCEDURE — 85018 HEMOGLOBIN: CPT

## 2022-06-15 PROCEDURE — 85014 HEMATOCRIT: CPT

## 2022-06-15 PROCEDURE — 82330 ASSAY OF CALCIUM: CPT

## 2022-06-15 PROCEDURE — 85730 THROMBOPLASTIN TIME PARTIAL: CPT

## 2022-06-15 PROCEDURE — 83605 ASSAY OF LACTIC ACID: CPT

## 2022-06-15 PROCEDURE — 82140 ASSAY OF AMMONIA: CPT

## 2022-06-15 PROCEDURE — 72170 X-RAY EXAM OF PELVIS: CPT

## 2022-06-15 PROCEDURE — 80307 DRUG TEST PRSMV CHEM ANLYZR: CPT

## 2022-06-15 PROCEDURE — 82803 BLOOD GASES ANY COMBINATION: CPT

## 2022-06-15 PROCEDURE — 70450 CT HEAD/BRAIN W/O DYE: CPT

## 2022-06-15 PROCEDURE — 99231 SBSQ HOSP IP/OBS SF/LOW 25: CPT

## 2022-06-15 PROCEDURE — 80053 COMPREHEN METABOLIC PANEL: CPT

## 2022-06-15 PROCEDURE — 86901 BLOOD TYPING SEROLOGIC RH(D): CPT

## 2022-06-15 PROCEDURE — 84100 ASSAY OF PHOSPHORUS: CPT

## 2022-06-15 PROCEDURE — 71045 X-RAY EXAM CHEST 1 VIEW: CPT

## 2022-06-15 PROCEDURE — 82947 ASSAY GLUCOSE BLOOD QUANT: CPT

## 2022-06-15 PROCEDURE — 82435 ASSAY OF BLOOD CHLORIDE: CPT

## 2022-06-15 PROCEDURE — 90471 IMMUNIZATION ADMIN: CPT

## 2022-06-15 PROCEDURE — 36415 COLL VENOUS BLD VENIPUNCTURE: CPT

## 2022-06-15 PROCEDURE — 85027 COMPLETE CBC AUTOMATED: CPT

## 2022-06-15 PROCEDURE — 86850 RBC ANTIBODY SCREEN: CPT

## 2022-06-15 PROCEDURE — 72125 CT NECK SPINE W/O DYE: CPT | Mod: MA

## 2022-06-15 PROCEDURE — 72141 MRI NECK SPINE W/O DYE: CPT

## 2022-06-15 PROCEDURE — 99285 EMERGENCY DEPT VISIT HI MDM: CPT | Mod: 25

## 2022-06-15 PROCEDURE — 73130 X-RAY EXAM OF HAND: CPT

## 2022-06-15 PROCEDURE — 74177 CT ABD & PELVIS W/CONTRAST: CPT | Mod: MA

## 2022-06-15 PROCEDURE — 71260 CT THORAX DX C+: CPT | Mod: MA

## 2022-06-15 PROCEDURE — 86900 BLOOD TYPING SEROLOGIC ABO: CPT

## 2022-06-15 PROCEDURE — 73090 X-RAY EXAM OF FOREARM: CPT

## 2022-06-15 PROCEDURE — 94640 AIRWAY INHALATION TREATMENT: CPT

## 2022-06-15 PROCEDURE — 73110 X-RAY EXAM OF WRIST: CPT

## 2022-06-15 PROCEDURE — 92610 EVALUATE SWALLOWING FUNCTION: CPT

## 2022-06-15 PROCEDURE — 73200 CT UPPER EXTREMITY W/O DYE: CPT

## 2022-06-15 PROCEDURE — 94760 N-INVAS EAR/PLS OXIMETRY 1: CPT

## 2022-06-15 PROCEDURE — 96365 THER/PROPH/DIAG IV INF INIT: CPT

## 2022-06-15 PROCEDURE — 84132 ASSAY OF SERUM POTASSIUM: CPT

## 2022-06-15 PROCEDURE — 90715 TDAP VACCINE 7 YRS/> IM: CPT

## 2022-06-15 PROCEDURE — 97163 PT EVAL HIGH COMPLEX 45 MIN: CPT

## 2022-06-15 PROCEDURE — 87640 STAPH A DNA AMP PROBE: CPT

## 2022-06-15 PROCEDURE — U0005: CPT

## 2022-06-15 PROCEDURE — 92526 ORAL FUNCTION THERAPY: CPT

## 2022-06-15 PROCEDURE — 80048 BASIC METABOLIC PNL TOTAL CA: CPT

## 2022-06-15 PROCEDURE — 86803 HEPATITIS C AB TEST: CPT

## 2022-06-15 PROCEDURE — 81003 URINALYSIS AUTO W/O SCOPE: CPT

## 2022-06-15 PROCEDURE — U0003: CPT

## 2022-06-15 PROCEDURE — 83690 ASSAY OF LIPASE: CPT

## 2022-06-15 PROCEDURE — 83735 ASSAY OF MAGNESIUM: CPT

## 2022-06-15 PROCEDURE — 70498 CT ANGIOGRAPHY NECK: CPT

## 2022-06-15 PROCEDURE — 85610 PROTHROMBIN TIME: CPT

## 2022-06-15 RX ADMIN — LIDOCAINE 1 PATCH: 4 CREAM TOPICAL at 01:47

## 2022-06-15 RX ADMIN — Medication 1 APPLICATION(S): at 17:02

## 2022-06-15 RX ADMIN — Medication 1 APPLICATION(S): at 06:00

## 2022-06-15 RX ADMIN — Medication 25 MILLIGRAM(S): at 06:00

## 2022-06-15 RX ADMIN — APREMILAST 30 MILLIGRAM(S): KIT ORAL at 09:04

## 2022-06-15 RX ADMIN — Medication 25 MILLIGRAM(S): at 17:01

## 2022-06-15 RX ADMIN — Medication 100 MILLIGRAM(S): at 09:04

## 2022-06-15 RX ADMIN — LIDOCAINE 1 PATCH: 4 CREAM TOPICAL at 09:04

## 2022-06-15 RX ADMIN — ENOXAPARIN SODIUM 40 MILLIGRAM(S): 100 INJECTION SUBCUTANEOUS at 09:11

## 2022-06-15 RX ADMIN — Medication 1 MILLIGRAM(S): at 09:03

## 2022-06-15 NOTE — PROGRESS NOTE ADULT - PROVIDER SPECIALTY LIST ADULT
Orthopedics
SICU
Trauma Surgery
Neurosurgery
SICU
Trauma Surgery
Trauma Surgery
Neurosurgery
SICU
Surgery
Trauma Surgery
Trauma Surgery

## 2022-06-15 NOTE — DISCHARGE NOTE NURSING/CASE MANAGEMENT/SOCIAL WORK - NSDCPEFALRISK_GEN_ALL_CORE
For information on Fall & Injury Prevention, visit: [email or web address] OR  [email or web address] OR  https://www.cdc.gov/steadi/patient.html

## 2022-06-15 NOTE — DISCHARGE NOTE NURSING/CASE MANAGEMENT/SOCIAL WORK - NSDCVIVACCINE_GEN_ALL_CORE_FT
Tdap; 02-Jun-2022 21:19; Brionna Honeycutt (ANKITA); [employer]; [id number] (Exp. Date: 11-Mar-2024); IntraMuscular; Deltoid Left.; 0.5 milliLiter(s); VIS (VIS Published: 09-May-2013, VIS Presented: 02-Jun-2022);

## 2022-06-15 NOTE — PROGRESS NOTE ADULT - SUBJECTIVE AND OBJECTIVE BOX
SUBJECTIVE/24 hour events:  Patient is a 66yMale s/p fall sustaining C1 arch fx, left rib fractures 6-8, occult pneumo ( resolved) Patient with no acute events overnight, c-collar at all time, pain controlled on current regimen, sutures removed from metatarsal laceration. Patient awaiting placement to [facility]      Vital Signs Last 24 Hrs  T(C): 37.2 (14 Jun 2022 23:00), Max: 37.2 (14 Jun 2022 23:00)  T(F): 99 (14 Jun 2022 23:00), Max: 99 (14 Jun 2022 23:00)  HR: 74 (14 Jun 2022 23:00) (73 - 102)  BP: 144/86 (14 Jun 2022 23:00) (106/68 - 144/86)  BP(mean): --  RR: 20 (14 Jun 2022 22:30) (16 - 20)  SpO2: 98% (14 Jun 2022 23:00) (91% - 98%)  Drug Dosing Weight  Height (cm): 162.6 (03 Jun 2022 00:40)  Weight (kg): 74.9 (03 Jun 2022 00:40)  BMI (kg/m2): 28.3 (03 Jun 2022 00:40)  BSA (m2): 1.8 (03 Jun 2022 00:40)  I&O's Detail    13 Jun 2022 07:01  -  14 Jun 2022 07:00  --------------------------------------------------------  IN:  Total IN: 0 mL    OUT:    Voided (mL): 100 mL  Total OUT: 100 mL    Total NET: -100 mL        Allergies    Allergy Status Unknown  No Known Allergies    Intolerances                ROS:    PHYSICAL EXAM:  Constitutional: resting comfortably   ENMT: C-collar in place  Respiratory: no respiratory distress, no dyspnea, no supplemental o2 needed  Gastrointestinal: abdomen soft, non-tender, atraumatic   Genitourinary: voiding spontaneously   Extremities: atraumatic   Neurological: NAD  Skin: warm, dry and no rashes         MEDICATIONS  (STANDING):  apremilast 30 milliGRAM(s) Oral daily  BACItracin   Ointment 1 Application(s) Topical two times a day  enoxaparin Injectable 40 milliGRAM(s) SubCutaneous every 24 hours  folic acid 1 milliGRAM(s) Oral every 24 hours  lidocaine   4% Patch 1 Patch Transdermal daily  metoprolol tartrate 25 milliGRAM(s) Oral two times a day  thiamine 100 milliGRAM(s) Oral every 24 hours    MEDICATIONS  (PRN):  acetaminophen     Tablet .. 975 milliGRAM(s) Oral every 6 hours PRN Mild Pain (1 - 3)  ALBUTerol    0.083% 2.5 milliGRAM(s) Nebulizer every 6 hours PRN Shortness of Breath and/or Wheezing  ondansetron Injectable 4 milliGRAM(s) IV Push every 6 hours PRN Nausea and/or Vomiting      RADIOLOGY STUDIES:    CULTURES:

## 2022-06-15 NOTE — PROGRESS NOTE ADULT - ASSESSMENT
67 y/o male s/p fall down stairs while intoxicated sustaining C1 L lateral arch fx and rib fx 6-8 with occult ptx, alcohol withdrawal, CIWA protocol, valium taper completed with CIWA 4-6.  Passes swallow eval, on soft diet now. Sutures removed from    - good pulmonary toliet    - C-collar immobilization at all times  - Continue PIC protocol  - Local wound care  - PT /OT  - DVT prophylaxis  - Dispo placement to pat

## 2022-06-15 NOTE — PROGRESS NOTE ADULT - NS ATTEND AMEND GEN_ALL_CORE FT
I have seen and examined the patient during rounds form 2006-2338 hrs  events noted  no new issues.  Pt  DVT chemoprophylaxis  Dipos planning
NSGY Attg:    see above    patient seen and examined  SCHUMACHER equally    agree with exam and plan as above  MRI cervical spine with thin cuts through skull base pending  continue cervical collar at all times
NSGY Attg:    see above    patient seen and examined    agree with exam and plan as above    MRI reviewed -- awaiting thin cuts through skull base to assess integrity of CHAU  continue collar at all times
NSGY Attg:    see above    patient seen and examined  SCHUMACHER equally    agree with exam and plan as above  MRI cervical spine with thin cuts through skull base pending  continue cervical collar at all times

## 2022-06-15 NOTE — DISCHARGE NOTE NURSING/CASE MANAGEMENT/SOCIAL WORK - PATIENT PORTAL LINK FT
You can access the FollowMyHealth Patient Portal offered by [facility] by registering at the following website: [email or web address]. By joining [facility]’s FollowMyHealth portal, you will also be able to view your health information using other applications (apps) compatible with our system.

## 2022-06-16 ENCOUNTER — NON-APPOINTMENT (OUTPATIENT)
Age: 67
End: 2022-06-16

## 2022-06-30 ENCOUNTER — APPOINTMENT (OUTPATIENT)
Dept: FAMILY MEDICINE | Facility: CLINIC | Age: 67
End: 2022-06-30

## 2022-06-30 VITALS
TEMPERATURE: 97.7 F | OXYGEN SATURATION: 99 % | WEIGHT: 168 LBS | DIASTOLIC BLOOD PRESSURE: 60 MMHG | SYSTOLIC BLOOD PRESSURE: 104 MMHG | HEART RATE: 85 BPM | BODY MASS INDEX: 28.68 KG/M2 | HEIGHT: 64 IN

## 2022-06-30 DIAGNOSIS — S12.030D: ICD-10-CM

## 2022-06-30 DIAGNOSIS — Z09 ENCOUNTER FOR FOLLOW-UP EXAMINATION AFTER COMPLETED TREATMENT FOR CONDITIONS OTHER THAN MALIGNANT NEOPLASM: ICD-10-CM

## 2022-06-30 PROBLEM — E78.5 HYPERLIPIDEMIA, UNSPECIFIED: Chronic | Status: ACTIVE | Noted: 2022-06-02

## 2022-06-30 PROCEDURE — 99214 OFFICE O/P EST MOD 30 MIN: CPT

## 2022-06-30 NOTE — ASSESSMENT
[FreeTextEntry1] : Pt is a 66yo male presenting to the office requesting disability paperwork s/p recent hospitalization.  Pt s/p hospitalization at [facility] from 6/2/22-6/15/22 s/p fall down 14 stairs with LOC.  Pt suffered C1 fracture, multiple rib fractures with hemopneumothorax, EtOH withdrawal.  Transferred to [facility] where he completed rehab, has now been home since 6/28/22 and feels generally well and improved.\par \par Hospitalization\par - Follow up with Neurosurgery.\par - Wear collar AT ALL TIMES until cleared by Neurosurgery.\par - Complete PT as prescribed.\par - Limit/avoid alcohol intake.\par - Acetaminophen as needed for pain.\par - Follow up with our office as needed.\par \par Call the office or go to the ED immediately if you develop new, worsening or concerning symptoms including high fever, severe headache/worst headache of your life, confusion, dizziness/lightheadedness, loss of consciousness, severe chest pain, difficulty breathing, shortness of breath, severe abdominal pain, excessive vomiting/diarrhea, inability to feel/move the extremities, or any other concerning symptoms.\par

## 2022-06-30 NOTE — HISTORY OF PRESENT ILLNESS
[FreeTextEntry1] : Follow up recent hospitalization. [de-identified] : Pt is a 68yo male presenting to the office for hospital follow up.\par \par On 6/2/22, pt was talking to his wife while at the top of the stairs.\par Per wife, pt made funny sound, lost consciousness, fell down about 14 stairs.\par Does not recall events, does not remember falling down stairs.\par Pt was transported via EMS to [facility] for evaluation.\par \par Pt hospitalized at [facility] from 06/02/2022 through 06/15/2022. \par Pt suffered C1 arch fracture, left 6-8 rib fractures, and occult pneumothorax.  Pt had right wrist arterial laceration that was repaired. \par Pt reportedly went through alcohol withdrawal while hospitalized, hallucinations, evaluated by Psychiatry who attributed symptoms to EtOH withdrawal, completed Valium taper. \par Pt in C-collar.  Is awaiting follow up appointment with Neurosurgery, Dr. Jose Jensen ([place]). \par CT brain negative x2 (6/2/22 and 6/13/22). \par CTA neck negative for vertebral or carotid injury s/p C1 fracture. \par CT chest/abdomen/pelvis:  multifocal acute fractures of left 6th-8th ribs with small left-sided hemopneumothorax and possible pulmonary lac of left lower lobe.  Left sciatic nerve enlarged with fat stranding.  Bladder/urinary tract 2/2 underlying chronic bladder outlet obstruction. \par MRI C-spine:  nondisplaced fracture at the left anterior arch of C1 and left body of C1.  Prevertebral edema C1-C5.  No ligament disruption. \par CT wrist:  old fracture, negative acute. \par Pt borderline anemic through hospitalization. \par Blood alcohol 280 on arrival to ED. \par \par Pt transferred from [facility] to [facility] where he stayed until 6/28/22.\par plans for outpatient PT and RN at home\par \par Pt reports back pain but feels otherwise well\par Pt in C-collar, understands to remain in place until follow up with Neurosurgery.\par Has some difficulty with mixing up words, mild short term memory issues but is doing much better since discharge.

## 2022-06-30 NOTE — PHYSICAL EXAM
[No Respiratory Distress] : no respiratory distress  [No Edema] : there was no peripheral edema [Normal] : no rash [Coordination Grossly Intact] : coordination grossly intact [No Focal Deficits] : no focal deficits [Normal Gait] : normal gait [Normal Affect] : the affect was normal [Normal Insight/Judgement] : insight and judgment were intact [de-identified] : C-collar in place

## 2022-06-30 NOTE — REVIEW OF SYSTEMS
[Negative] : Heme/Lymph [FreeTextEntry9] : back pain [de-identified] : mild confusion significant improved since hospital discharge

## 2022-07-06 ENCOUNTER — APPOINTMENT (OUTPATIENT)
Dept: NEUROSURGERY | Facility: CLINIC | Age: 67
End: 2022-07-06

## 2022-07-06 VITALS
DIASTOLIC BLOOD PRESSURE: 66 MMHG | BODY MASS INDEX: 28.17 KG/M2 | HEART RATE: 90 BPM | HEIGHT: 64 IN | SYSTOLIC BLOOD PRESSURE: 101 MMHG | WEIGHT: 165 LBS | OXYGEN SATURATION: 95 % | TEMPERATURE: 98.5 F

## 2022-07-06 PROCEDURE — 99213 OFFICE O/P EST LOW 20 MIN: CPT

## 2022-07-06 NOTE — DATA REVIEWED
[de-identified] : EXAM: MR SPINE CERVICAL\par \par PROCEDURE DATE: 06/04/2022\par \par \par \par INTERPRETATION: INDICATION: Patient with fracture through the anterior arch of C1\par \par TECHNIQUE: Sagittal T1 weighted and T2 weighted images of the cervical spine as well as axial T1 weighted and T2 weighted images were obtained.\par \par COMPARISON EXAMINATION: CT evaluation 6/2/2022.\par \par FINDINGS:\par Vertebral bodies and Discs: Nondisplaced fracture through the left anterior arch and left body of C1 appreciated.\par Alignment: No subluxations.\par Spinal cord: No evidence of epidural hematoma. No canal compromise\par Spinal canal: No other intradural or extradural defects are seen. Posterior ligamentous complex is intact. No interspinous edema.\par Miscellaneous: Prevertebral edema at the C1 level and extending more inferiorly to the CT 4 5 level\par \par IMPRESSION: Nondisplaced fracture at the left anterior arch of C1 and left body of C1 identified. Prevertebral edema at the C1 level extending down to the C4-5 level. No definite disruption of the anterior posterior longitudinal ligaments identified. No evidence of posterior ligamentous disruption at the level of the ligamentum flavum or interspinous ligaments

## 2022-07-06 NOTE — PHYSICAL EXAM
[General Appearance - Alert] : alert [General Appearance - In No Acute Distress] : in no acute distress [General Appearance - Well Nourished] : well nourished [Oriented To Time, Place, And Person] : oriented to person, place, and time [Impaired Insight] : insight and judgment were intact [Affect] : the affect was normal [Motor Strength] : muscle strength was normal in all four extremities [Sensation Tactile Decrease] : light touch was intact [Sensation Pain / Temperature Decrease] : pain and temperature was intact [Balance] : balance was intact [No Visual Abnormalities] : no visible abnormailities [No Tenderness to Palpation] : no spine tenderness on palpation [Normal] : normal [Able to toe walk] : the patient was able to toe walk [Able to heel walk] : the patient was able to heel walk [Sclera] : the sclera and conjunctiva were normal [Neck Appearance] : the appearance of the neck was normal [] : no respiratory distress [No Spinal Tenderness] : no spinal tenderness [Involuntary Movements] : no involuntary movements were seen [Motor Tone] : muscle strength and tone were normal [FreeTextEntry8] : slow careful gait

## 2022-07-06 NOTE — ASSESSMENT
[FreeTextEntry1] : Patient with above history and imaging.  Nondisplaced fracture at the left anterior arch of C1 and left body of C1 identified. Prevertebral edema at the C1 level extending down to the C4-5 level. No neurological deficits. Continue to wear cervical collar at all times. CT cervical spine to assess fracture. Advised if he needs resources to help stop drinking those can be provided. Advised he is a fall risk and risk for further injury, that alcohol may exacerbate this issue, pt is aware and verbalized understanding. Correct way to wear collar demonstrated. \par \par Plan:\par CT cervical spine\par f/u after imaging\par Patient knows to call the office if there are any new or worsening symptoms. \par All questions and concerns answered and addressed in detail to patient's complete satisfaction. Patient verbalized understanding and agreed to plan.\par \par  \par \par \par \par

## 2022-07-06 NOTE — HISTORY OF PRESENT ILLNESS
[< 3 months] : less than 3 months [FreeTextEntry1] : C1 fracture [de-identified] : MARQUITA MARTINI is a 67 year old male who presents for neurosurgical evaluation of C1 fracture. He presented to [facility] on 6/2-6/15 s/p fall from backwards about 15 stairs while intoxicated, trauma B activation. Reports pain in his R wrist where there is an active bleeding laceration. No other complaints at this time. Confused, GCS 14, hemodynamically stable. Denies taking any blood thinners at home. Denies chest pain, SOB, dyspnea, abdominal pain, N/V, dizziness. CT imaging showed C1 arch fracture, left 6-8 rib fxs, and  occult pneumothorax. Patient also had laceration to R wrist which was repaired at bedside. Patient was admitted to the trauma service. Placed on rib fx protocol (PIC protocol) w/ emphasis on pulm toiletting & multimodal analgesia. CTA neck showed no BCVI. Kept in cervical collar - neurosx consulted for C1 fx. MRI obtained showing no ligamentous injury, epidural hematoma. Plan is for patient to remain in c-collar @ all times. Hospital course complicated by ETOH withdrawal. Patient completed Valium taper. Required 1:1 constant observation for impulsivity and delirium. Psychiatry consulted for delirium, hallucinations & varying levels of alertness - felt these are likely protracted symptoms of ETOH withdrawal given severity of alcohol use. Kept on thiamine & folic acid. CT head without organic cause of AMS. Patient was able to be taken off 1:1 constant observation on 6/12 and has remained without need for PRN medication. Patient was evaluated by SLP due to concern for dysphagia given cervical fx & c-collar. Currently tolerating a pureed diet w/ moderately thickened liquids, as per their recommendations. He was evaluated by PT who recommended RANJAN upon discharge. \par Currently he states he feels better. He has no complaints of neck pain. Does take medications for his arthritis. He denies numbness/tingling, incontinence, loss of strength, or muscle weakness, and imbalance. He endorses being cold all the time and no appetite. He has continued to drink and drinks alcohol 2x per day. He states he is wearing his collar at all times.

## 2022-07-19 ENCOUNTER — APPOINTMENT (OUTPATIENT)
Dept: CT IMAGING | Facility: CLINIC | Age: 67
End: 2022-07-19

## 2022-07-19 ENCOUNTER — RESULT REVIEW (OUTPATIENT)
Age: 67
End: 2022-07-19

## 2022-07-19 ENCOUNTER — OUTPATIENT (OUTPATIENT)
Dept: OUTPATIENT SERVICES | Facility: HOSPITAL | Age: 67
LOS: 1 days | End: 2022-07-19
Payer: COMMERCIAL

## 2022-07-19 DIAGNOSIS — Z00.8 ENCOUNTER FOR OTHER GENERAL EXAMINATION: ICD-10-CM

## 2022-07-19 PROCEDURE — 76376 3D RENDER W/INTRP POSTPROCES: CPT

## 2022-07-19 PROCEDURE — 72125 CT NECK SPINE W/O DYE: CPT | Mod: 26

## 2022-07-19 PROCEDURE — 76376 3D RENDER W/INTRP POSTPROCES: CPT | Mod: 26

## 2022-07-19 PROCEDURE — 72125 CT NECK SPINE W/O DYE: CPT

## 2022-07-31 ENCOUNTER — OUTPATIENT (OUTPATIENT)
Dept: OUTPATIENT SERVICES | Facility: HOSPITAL | Age: 67
LOS: 1 days | Discharge: HOME | End: 2022-07-31

## 2022-07-31 DIAGNOSIS — D64.9 ANEMIA, UNSPECIFIED: ICD-10-CM

## 2022-08-01 ENCOUNTER — APPOINTMENT (OUTPATIENT)
Dept: RHEUMATOLOGY | Facility: CLINIC | Age: 67
End: 2022-08-01

## 2022-08-01 VITALS
BODY MASS INDEX: 29.37 KG/M2 | SYSTOLIC BLOOD PRESSURE: 100 MMHG | WEIGHT: 172 LBS | HEIGHT: 64 IN | HEART RATE: 60 BPM | TEMPERATURE: 97.1 F | OXYGEN SATURATION: 99 % | DIASTOLIC BLOOD PRESSURE: 70 MMHG

## 2022-08-01 PROCEDURE — 36415 COLL VENOUS BLD VENIPUNCTURE: CPT

## 2022-08-01 PROCEDURE — 99214 OFFICE O/P EST MOD 30 MIN: CPT | Mod: 25

## 2022-08-02 ENCOUNTER — APPOINTMENT (OUTPATIENT)
Dept: NEUROSURGERY | Facility: CLINIC | Age: 67
End: 2022-08-02

## 2022-08-02 VITALS
DIASTOLIC BLOOD PRESSURE: 84 MMHG | SYSTOLIC BLOOD PRESSURE: 131 MMHG | HEIGHT: 64 IN | WEIGHT: 172 LBS | HEART RATE: 69 BPM | BODY MASS INDEX: 29.37 KG/M2 | OXYGEN SATURATION: 98 % | TEMPERATURE: 98.5 F

## 2022-08-02 PROCEDURE — 99212 OFFICE O/P EST SF 10 MIN: CPT

## 2022-08-02 NOTE — HISTORY OF PRESENT ILLNESS
[FreeTextEntry1] : INTERVAL HX\par in terms of the arthritis \par - doing okay and is stable -  tolerates the meds without weight loss or depression.  \par - denies swelling, stiffness or pain currently \par - denies dactylitis\par - skin is manageable\par - AMS under 15 min\par \par new issue is the increased BP \par - denies cp, sob, headache or visual changes \par - had bp check last week and was elevated as well \par - not taking decongestants \par \par new issue is recent fall \par - fell down the stairs - broke cervical vertebrea \par - in collar - hoping to not have to do surgery \par - denies paraesthesia or motor dysfunction\par \par upcoming issue\par -  iminent assisted - expects to move [facility] in the winter\par - sometimes forgets to take meds\par

## 2022-08-02 NOTE — ASSESSMENT
[FreeTextEntry1] : Dandy is a 65-year-old PMHX HTN, Ps/PsA\par \par #PSA\par dx 2016 now controlled on combination of SSZ and otezla. chronic deformities without acute changes\par -- check inflammatory markers\par -- continue otezla \par -- continue SSZ\par \par #cervical fracture \par -- f/u neuro sx\par \par #medicine monitoring. long term use of drug\par -- check labs\par -- Quant tb negative September 2021\par \par #inc LFT. \par likely 2/2 dx with fatty liver, he does consume ETOH on a daily basis. \par -- monitor closely given meds\par \par #Anemia- chronic, possibly mixed picture with normal mcv\par - now being treated for b12 def (replete) \par - will f/u with heme\par \par #HTN\par d/w patient increased bp.  not on meds and denies symptoms \par -- rec pcp f/u\par -- rec treatment given length of htn\par -- limit meloxicam\par \par  More than 50% of the encounter was spent counseling the patient on differential, workup, disease course and treatment/management.  Education was provided to the patient during this encounter.  All questions and concerns were addressed and answered.   The patient verbalized understanding and agreed to the plan. \par \par Patient has been instructed to call for an appointment if new symptoms develop.\par Patient has been instructed to make a followup appointment in 3 months.\par \par Time spent on the encounter included, but is not limited to, preparing to see the patient, obtaining and/or reviewing separately obtained history, performing the evaluation, counseling and educating, independently interpreting results with communication to patient, order placement, referring and/or communicating with other health professionals as described, and documenting clinical information in the electronic health record\par \par

## 2022-08-02 NOTE — REVIEW OF SYSTEMS
[Abdominal Pain] : abdominal pain [Diarrhea] : diarrhea [Arthralgias] : arthralgias [Negative] : Heme/Lymph [Fever] : no fever [Chills] : no chills [Feeling Poorly] : not feeling poorly [Feeling Tired] : not feeling tired [Recent Weight Gain (___ Lbs)] : no recent weight gain [Recent Weight Loss (___ Lbs)] : no recent weight loss [Eye Pain] : no eye pain [Red Eyes] : eyes not red [Eyesight Problems] : no eyesight problems [Discharge From Eyes] : no purulent discharge from the eyes [Eyes Itch] : no itching of the eyes [Dry Eyes] : no dryness of the eyes [Earache] : no earache [Loss Of Hearing] : no hearing loss [Heart Rate Is Slow] : the heart rate was not slow [Heart Rate Is Fast] : the heart rate was not fast [Chest Pain] : no chest pain [Palpitations] : no palpitations [Leg Claudication] : no intermittent leg claudication [Lower Ext Edema] : no extremity edema [Shortness Of Breath] : no shortness of breath [Wheezing] : no wheezing [Cough] : no cough [SOB on Exertion] : no shortness of breath during exertion [Orthopnea] : no orthopnea [PND] : no PND [Vomiting] : no vomiting [Constipation] : no constipation [Heartburn] : no heartburn [Melena] : no melena [Joint Pain] : no joint pain [Joint Swelling] : no joint swelling [Joint Stiffness] : no joint stiffness [Itching] : no itching [de-identified] : much improved. Psoriasis

## 2022-08-03 PROBLEM — Z01.818 PREOP EXAMINATION: Status: RESOLVED | Noted: 2022-06-01 | Resolved: 2022-08-03

## 2022-08-03 PROBLEM — F10.231 ALCOHOL WITHDRAWAL DELIRIUM: Status: RESOLVED | Noted: 2022-08-03 | Resolved: 2022-08-03

## 2022-08-03 PROBLEM — K22.2 SCHATZKI'S RING OF DISTAL ESOPHAGUS: Status: RESOLVED | Noted: 2022-08-03 | Resolved: 2022-08-03

## 2022-08-04 ENCOUNTER — RESULT REVIEW (OUTPATIENT)
Age: 67
End: 2022-08-04

## 2022-08-04 ENCOUNTER — APPOINTMENT (OUTPATIENT)
Dept: HEMATOLOGY ONCOLOGY | Facility: CLINIC | Age: 67
End: 2022-08-04

## 2022-08-04 VITALS
OXYGEN SATURATION: 97 % | DIASTOLIC BLOOD PRESSURE: 84 MMHG | WEIGHT: 155.5 LBS | SYSTOLIC BLOOD PRESSURE: 138 MMHG | RESPIRATION RATE: 18 BRPM | HEART RATE: 71 BPM | TEMPERATURE: 98.2 F | HEIGHT: 62.2 IN | BODY MASS INDEX: 28.25 KG/M2

## 2022-08-04 DIAGNOSIS — E53.8 DEFICIENCY OF OTHER SPECIFIED B GROUP VITAMINS: ICD-10-CM

## 2022-08-04 DIAGNOSIS — Z01.818 ENCOUNTER FOR OTHER PREPROCEDURAL EXAMINATION: ICD-10-CM

## 2022-08-04 DIAGNOSIS — K22.2 ESOPHAGEAL OBSTRUCTION: ICD-10-CM

## 2022-08-04 DIAGNOSIS — D50.9 IRON DEFICIENCY ANEMIA, UNSPECIFIED: ICD-10-CM

## 2022-08-04 DIAGNOSIS — F10.231 ALCOHOL DEPENDENCE WITH WITHDRAWAL DELIRIUM: ICD-10-CM

## 2022-08-04 LAB
BASOPHILS # BLD AUTO: 0.03 K/UL — SIGNIFICANT CHANGE UP (ref 0–0.2)
BASOPHILS NFR BLD AUTO: 0.8 % — SIGNIFICANT CHANGE UP (ref 0–2)
EOSINOPHIL # BLD AUTO: 0.08 K/UL — SIGNIFICANT CHANGE UP (ref 0–0.5)
EOSINOPHIL NFR BLD AUTO: 2.1 % — SIGNIFICANT CHANGE UP (ref 0–6)
FERRITIN SERPL-MCNC: 97 NG/ML — SIGNIFICANT CHANGE UP (ref 30–400)
HAPTOGLOB SERPL-MCNC: 180 MG/DL — SIGNIFICANT CHANGE UP (ref 34–200)
HCT VFR BLD CALC: 33.7 % — LOW (ref 39–50)
HCT VFR BLD CALC: 34.8 % — LOW (ref 39–50)
HGB BLD-MCNC: 11.4 G/DL — LOW (ref 13–17)
IMM GRANULOCYTES NFR BLD AUTO: 0.3 % — SIGNIFICANT CHANGE UP (ref 0–1.5)
IRON SATN MFR SERPL: 29 % — SIGNIFICANT CHANGE UP (ref 16–55)
IRON SATN MFR SERPL: 87 UG/DL — SIGNIFICANT CHANGE UP (ref 45–165)
LYMPHOCYTES # BLD AUTO: 1.49 K/UL — SIGNIFICANT CHANGE UP (ref 1–3.3)
LYMPHOCYTES # BLD AUTO: 38.6 % — SIGNIFICANT CHANGE UP (ref 13–44)
MCHC RBC-ENTMCNC: 31.5 PG — SIGNIFICANT CHANGE UP (ref 27–34)
MCHC RBC-ENTMCNC: 33.8 GM/DL — SIGNIFICANT CHANGE UP (ref 32–36)
MCV RBC AUTO: 93.1 FL — SIGNIFICANT CHANGE UP (ref 80–100)
MONOCYTES # BLD AUTO: 0.34 K/UL — SIGNIFICANT CHANGE UP (ref 0–0.9)
MONOCYTES NFR BLD AUTO: 8.8 % — SIGNIFICANT CHANGE UP (ref 2–14)
NEUTROPHILS # BLD AUTO: 1.91 K/UL — SIGNIFICANT CHANGE UP (ref 1.8–7.4)
NEUTROPHILS NFR BLD AUTO: 49.4 % — SIGNIFICANT CHANGE UP (ref 43–77)
NRBC # BLD: 0 /100 WBCS — SIGNIFICANT CHANGE UP (ref 0–0)
PLATELET # BLD AUTO: 193 K/UL — SIGNIFICANT CHANGE UP (ref 150–400)
RBC # BLD: 3.62 M/UL — LOW (ref 4.2–5.8)
RBC # BLD: 3.66 M/UL — LOW (ref 4.2–5.8)
RBC # FLD: 15.9 % — HIGH (ref 10.3–14.5)
RETICS #: 84.5 K/UL — SIGNIFICANT CHANGE UP (ref 25–125)
RETICS/RBC NFR: 2.3 % — SIGNIFICANT CHANGE UP (ref 0.5–2.5)
TIBC SERPL-MCNC: 298 UG/DL — SIGNIFICANT CHANGE UP (ref 220–430)
UIBC SERPL-MCNC: 211 UG/DL — SIGNIFICANT CHANGE UP (ref 110–370)
VIT B12 SERPL-MCNC: 944 PG/ML — SIGNIFICANT CHANGE UP (ref 232–1245)
WBC # BLD: 3.86 K/UL — SIGNIFICANT CHANGE UP (ref 3.8–10.5)
WBC # FLD AUTO: 3.86 K/UL — SIGNIFICANT CHANGE UP (ref 3.8–10.5)

## 2022-08-04 PROCEDURE — 99214 OFFICE O/P EST MOD 30 MIN: CPT

## 2022-08-04 RX ORDER — ALBUTEROL SULFATE 0.63 MG/3ML
0.63 SOLUTION RESPIRATORY (INHALATION)
Qty: 150 | Refills: 0 | Status: DISCONTINUED | COMMUNITY
Start: 2022-06-28 | End: 2022-08-04

## 2022-08-04 NOTE — HISTORY OF PRESENT ILLNESS
[de-identified] : MARQUITA MARTINI is a 67 y.o. M with a PMH significant for HTN, psoriasis, psoriatic arthritis, fatty liver, and alcohol abuse, who we are following for a multifactorial anemia. \par \par 10/17/19 - Initially seen in our office for a microcytic anemia (Dr. Benítez). \par Hgb: 11.3,  Hct: 37.2,  MCV: 81.4,  Ferritin: 22,  B12: 225.  \par Diagnosed with B12 deficiency - Was started on B12 injections, then OTC B12.   \par Ferritin not addressed. \par \par 11/19/19 - Hgb: 10.1, Hct: 32.7, MCV: 82.4, Folate: 3.2, Ferritin: 15\par Diagnosed with Folate deficiency - Folic acid ordered.  \par Was found to have at least 2 alcoholic drinks per day - informed of marrow - toxic effects of alcohol and advised to cut back. \par Consider IV iron. \par \par 2/22/21 - Hgb: 12.9, Hct: 38.2, MCV: 89.7 Folate: >20, B12: >2000, Ferritin: 37\par 8/3/21 -   Hgb: 12.9, Hct: 38.3, MCV: 95.5  \par \par GI eval:\par Colonoscopy 5/2019 (Jamari) - essentially normal, no evidence of bleeding. \par EGD 5/2019 - MIld gastritis.  Repeated 6/2019 - Obstructing Schatzki ring at GE junction -  dilated.  [de-identified] : 6/2/22 - 6/15/22 - Hospitalized after fell down ~ 15 steps while intoxicated.  Had closed C1 fracture and closed rib fracture.  Hospital course was complicated by EtOH withdrawal.  D/C'ed to [facility] -> 6/28/22. \par Prior to this was scheduled to have hemorrhoid surgery at [facility] on 6/7/22 with Dr. Willie Messer. \par \par 8/1/22 - WBC: 3.43,  ANC: 1.22   Hgb: 10.7, Hct: 33.0, MCV: 94.8,  ESR: 52, creat: 0.67\par \par Patient reports still getting outpatient PT. \par States doing OK.  Wearing neck brace. \par States still drinking his "usual" - drinking on a daily basis. \par \par Major complaint is his arthralgias.  Only taking Meloxicam.  Reports has stopped Sulfasalazine as if has an interaction with the Meloxicam??? ([facility]heum note does not mention this). \par \par Denies any other changes in his family, medical, or social history since his last visit of 8/3/21.

## 2022-08-04 NOTE — RESULTS/DATA
[FreeTextEntry1] : WBC: 3.86  ANC: 1.91   Hgb: 11.4   Hct: 33.7    MCV:  93.1  Plts:193\par B12, RBC Folate, Ferritin, TSAT, Hapto, Retic, and Vit C: pending

## 2022-08-04 NOTE — ASSESSMENT
[FreeTextEntry1] : Patient is a 67 y.o. with a multifactorial anemia - has B12 deficiency, folate deficiency, iron deficiency, and marrow toxicity from daily EtOH use. \par B12  and folate levels have normalized since oral supplementation. \par \par Colonoscopy 5/2019 (Jamari) - essentially normal, no evidence of bleeding. \par EGD 5/2019 - MIld gastritis.  Repeated 6/2019 - Obstructing Schatzki ring at GE junction -  dilated. \par Was preop for hemorrhoid surgery prior to recent hospitalization - iron deficiency can be from hx of bleeding hemorrhoids, or ? bleeding 2nd chronic Meloxicam use/ EtOH use. \par \par Last Ferritin level 2/2021 was 37 - falsely elevated in setting of high ESR of 57.\par Iron has never been replaced.  Was never given a trial of PO iron. \par Does not think he has ever had a blood transfusion. \par Repeat iron studies done today. \par If Ferritin still < 50, ricki if elevated ESR, would recommend IV iron. \par Patient denies pica, however sometimes arthralgias can be exacerbated in the setting of iron deficiency.  May not only help fatigue, anemia, but also his pains. \par Recommend Feraheme x 2 or Venofer x 4 depending on insurance, then labs 1 month after to assess response.  \par Possible side effects reviewed - aware of potential for allergic reaction - possible symptoms discussed included anaphylactic reactions.  Discussed true anaphylactic reactions are rare, <1%, but if this should occur he may need to be taken in an ambulance to the ER.  There can be staining of the skin if the IV infiltrates, joint pains, and flu-like symptoms.  He does have an upcoming CT scan but not an MRI. \par  All questions answered.  Consent form signed.\par Will call patient tomorrow with lab results and treatment plan. \par \par PCP KLEBER JEFFRIES  \par Vonnie Osorio (Rheum)\par Hussein Kauffman (GI)

## 2022-08-04 NOTE — PHYSICAL EXAM
[Normal] : well developed, well nourished, in no acute distress [de-identified] : wearing neck collar [de-identified] : anicteric [de-identified] : no c/c/e [de-identified] : no rashes or bruises.

## 2022-08-04 NOTE — REASON FOR VISIT
[Follow-Up Visit] : a follow-up visit for [FreeTextEntry2] : Multifactorial anemia; recent hospitalization

## 2022-08-05 LAB — FOLATE RBC-MCNC: 3563 NG/ML — SIGNIFICANT CHANGE UP (ref 499–1504)

## 2022-08-06 LAB
25(OH)D3 SERPL-MCNC: 49.6 NG/ML
ALBUMIN SERPL ELPH-MCNC: 3.8 G/DL
ALP BLD-CCNC: 90 U/L
ALT SERPL-CCNC: 8 U/L
ANION GAP SERPL CALC-SCNC: 13 MMOL/L
AST SERPL-CCNC: 22 U/L
BASOPHILS # BLD AUTO: 0.04 K/UL
BASOPHILS NFR BLD AUTO: 1.2 %
BILIRUB SERPL-MCNC: 0.4 MG/DL
BUN SERPL-MCNC: 6 MG/DL
CALCIUM SERPL-MCNC: 9.4 MG/DL
CHLORIDE SERPL-SCNC: 105 MMOL/L
CO2 SERPL-SCNC: 23 MMOL/L
CREAT SERPL-MCNC: 0.67 MG/DL
CRP SERPL-MCNC: 6 MG/L
EGFR: 102 ML/MIN/1.73M2
EOSINOPHIL # BLD AUTO: 0.08 K/UL
EOSINOPHIL NFR BLD AUTO: 2.3 %
ERYTHROCYTE [SEDIMENTATION RATE] IN BLOOD BY WESTERGREN METHOD: 52 MM/HR
HAV IGM SER QL: NONREACTIVE
HBV CORE IGG+IGM SER QL: NONREACTIVE
HBV CORE IGM SER QL: NONREACTIVE
HBV SURFACE AG SER QL: NONREACTIVE
HCT VFR BLD CALC: 33 %
HCV AB SER QL: NONREACTIVE
HCV S/CO RATIO: 0.19 S/CO
HGB BLD-MCNC: 10.7 G/DL
IMM GRANULOCYTES NFR BLD AUTO: 0 %
LYMPHOCYTES # BLD AUTO: 1.8 K/UL
LYMPHOCYTES NFR BLD AUTO: 52.5 %
M TB IFN-G BLD-IMP: NEGATIVE
MAN DIFF?: NORMAL
MCHC RBC-ENTMCNC: 30.7 PG
MCHC RBC-ENTMCNC: 32.4 GM/DL
MCV RBC AUTO: 94.8 FL
MONOCYTES # BLD AUTO: 0.29 K/UL
MONOCYTES NFR BLD AUTO: 8.5 %
NEUTROPHILS # BLD AUTO: 1.22 K/UL
NEUTROPHILS NFR BLD AUTO: 35.5 %
PLATELET # BLD AUTO: 189 K/UL
POTASSIUM SERPL-SCNC: 4.2 MMOL/L
PROT SERPL-MCNC: 7 G/DL
QUANTIFERON TB PLUS MITOGEN MINUS NIL: 7.05 IU/ML
QUANTIFERON TB PLUS NIL: 0.03 IU/ML
QUANTIFERON TB PLUS TB1 MINUS NIL: 0 IU/ML
QUANTIFERON TB PLUS TB2 MINUS NIL: 0 IU/ML
RBC # BLD: 3.48 M/UL
RBC # FLD: 15.9 %
SODIUM SERPL-SCNC: 141 MMOL/L
WBC # FLD AUTO: 3.43 K/UL

## 2022-08-09 LAB — VIT C SERPL-MCNC: 0.3 MG/DL — LOW (ref 0.4–2)

## 2022-08-10 ENCOUNTER — NON-APPOINTMENT (OUTPATIENT)
Age: 67
End: 2022-08-10

## 2022-08-30 ENCOUNTER — APPOINTMENT (OUTPATIENT)
Dept: CT IMAGING | Facility: CLINIC | Age: 67
End: 2022-08-30

## 2022-08-30 ENCOUNTER — OUTPATIENT (OUTPATIENT)
Dept: OUTPATIENT SERVICES | Facility: HOSPITAL | Age: 67
LOS: 1 days | End: 2022-08-30
Payer: MEDICARE

## 2022-08-30 ENCOUNTER — RESULT REVIEW (OUTPATIENT)
Age: 67
End: 2022-08-30

## 2022-08-30 DIAGNOSIS — S12.000A UNSPECIFIED DISPLACED FRACTURE OF FIRST CERVICAL VERTEBRA, INITIAL ENCOUNTER FOR CLOSED FRACTURE: ICD-10-CM

## 2022-08-30 PROCEDURE — 76376 3D RENDER W/INTRP POSTPROCES: CPT | Mod: 26

## 2022-08-30 PROCEDURE — 76376 3D RENDER W/INTRP POSTPROCES: CPT

## 2022-08-30 PROCEDURE — 72125 CT NECK SPINE W/O DYE: CPT | Mod: 26,MH

## 2022-08-30 PROCEDURE — 72125 CT NECK SPINE W/O DYE: CPT

## 2022-09-12 ENCOUNTER — APPOINTMENT (OUTPATIENT)
Dept: RHEUMATOLOGY | Facility: CLINIC | Age: 67
End: 2022-09-12

## 2022-09-13 ENCOUNTER — APPOINTMENT (OUTPATIENT)
Dept: NEUROSURGERY | Facility: CLINIC | Age: 67
End: 2022-09-13

## 2022-09-13 VITALS
HEART RATE: 75 BPM | WEIGHT: 152 LBS | DIASTOLIC BLOOD PRESSURE: 75 MMHG | TEMPERATURE: 98.3 F | SYSTOLIC BLOOD PRESSURE: 122 MMHG | HEIGHT: 62 IN | OXYGEN SATURATION: 97 % | BODY MASS INDEX: 27.97 KG/M2

## 2022-09-13 PROCEDURE — 99213 OFFICE O/P EST LOW 20 MIN: CPT

## 2022-09-27 ENCOUNTER — APPOINTMENT (OUTPATIENT)
Dept: FAMILY MEDICINE | Facility: CLINIC | Age: 67
End: 2022-09-27

## 2022-09-27 VITALS
DIASTOLIC BLOOD PRESSURE: 62 MMHG | OXYGEN SATURATION: 97 % | HEIGHT: 62 IN | WEIGHT: 155 LBS | BODY MASS INDEX: 28.52 KG/M2 | TEMPERATURE: 97.1 F | HEART RATE: 82 BPM | SYSTOLIC BLOOD PRESSURE: 110 MMHG

## 2022-09-27 DIAGNOSIS — Z23 ENCOUNTER FOR IMMUNIZATION: ICD-10-CM

## 2022-09-27 DIAGNOSIS — S22.42XD MULTIPLE FRACTURES OF RIBS, LEFT SIDE, SUBSEQUENT ENCOUNTER FOR FRACTURE WITH ROUTINE HEALING: ICD-10-CM

## 2022-09-27 PROCEDURE — G0008: CPT

## 2022-09-27 PROCEDURE — 90686 IIV4 VACC NO PRSV 0.5 ML IM: CPT

## 2022-09-27 PROCEDURE — 99213 OFFICE O/P EST LOW 20 MIN: CPT | Mod: 25

## 2022-09-27 RX ORDER — METOPROLOL TARTRATE 25 MG/1
25 TABLET, FILM COATED ORAL
Qty: 60 | Refills: 0 | Status: DISCONTINUED | COMMUNITY
Start: 2022-06-28 | End: 2022-09-27

## 2022-09-27 RX ORDER — DOXYCYCLINE HYCLATE 50 MG/1
50 TABLET, FILM COATED ORAL
Refills: 0 | Status: DISCONTINUED | COMMUNITY
Start: 2021-02-22 | End: 2022-09-27

## 2022-09-27 NOTE — HISTORY OF PRESENT ILLNESS
[FreeTextEntry1] : Follow up disability, hospitalization, C1 arch fracture. [de-identified] : Patient is a 68yo male with PMH HTN, C1 arch fracture, rib fx and PTX who presents to the office for follow up.  Pt seen at our office on 06/30/2022 for hospital follow up.  Pt hospitalized at [facility] from 6/2/22-6/15/22 after fall down stairs, sustained C1 arch fracture, rib fractures, PTX, radial artery laceration.  Pt had alcohol withdrawal in hospital.  Pt d/c to [facility], is now home and doing well. \par \par Pt states he feels good.  Continues to wear C-collar until next CT (to be done some time in October).  Denies neck pain, pain/numbness/tingling/weakness of the arms, incontinence, or headaches.  Pt will require PT after C-collar is removed so will need to be on disability for at least 6 weeks after collar removal.  Disability papers written out through 12/1/22, pt scheduled CPE for that week to determine return eligibility.\par Pt evaluated by Dr. Jensen, Neurosurgery, on 9/13/22.  Pt remains in C-collar.  Plans for repeat CT C-spine in 1 month.  Pt is not cleared to drive.  Recommended Neurology referral for memory issues. \par

## 2022-09-27 NOTE — ASSESSMENT
[FreeTextEntry1] : Patient is a 68yo male with PMH HTN, C1 arch fracture, rib fx and PTX who presents to the office for follow up.\par \par Pt remains in C-collar, needs repeat CT cervical spine prior to clearance out of collar.  Ordered by Dr. Jensen for some time in October.\par Will require at least 6 weeks of PT after so requires disability through around first week of December.\par Disability paperwork completed.\par Pt scheduled CPE for 12/09/2022 and will re-evaluate disability status at that time.\par Pt to continue to wear C-collar and f/u with neurosurgery as scheduled.\par Has appointment with Neurology scheduled for 04/2022.\par \par Flu shot given today in office.\par \par Call the office or go to the ED immediately if you develop new, worsening or concerning symptoms including high fever, severe headache/worst headache of your life, confusion, dizziness/lightheadedness, loss of consciousness, severe chest pain, difficulty breathing, shortness of breath, severe abdominal pain, excessive vomiting/diarrhea, inability to feel/move the extremities, or any other concerning symptoms.\par

## 2022-09-27 NOTE — PHYSICAL EXAM
[Normal] : normal sclera/conjunctiva, pupils are equal, round and reactive to light and extraocular movements are intact [Normal Outer Ear/Nose] : the outer ears and nose were normal in appearance [No JVD] : no jugular venous distention [No Respiratory Distress] : no respiratory distress  [No Edema] : there was no peripheral edema [Coordination Grossly Intact] : coordination grossly intact [No Focal Deficits] : no focal deficits [Normal Gait] : normal gait [Normal Affect] : the affect was normal [Normal Insight/Judgement] : insight and judgment were intact [de-identified] : neck remains in C-collar, full ROM of the bilateral upper extremities, neurovascular intact.

## 2022-09-27 NOTE — HEALTH RISK ASSESSMENT
[Former] : Former [Yes] : Yes [4 or more  times a week (4 pts)] : 4 or more  times a week (4 points) [1 or 2 (0 pts)] : 1 or 2 (0 points) [Never (0 pts)] : Never (0 points) [No] : In the past 12 months have you used drugs other than those required for medical reasons? No [Any fall with injury in past year] : Patient reported fall with injury in the past year [0] : 2) Feeling down, depressed, or hopeless: Not at all (0) [PHQ-2 Negative - No further assessment needed] : PHQ-2 Negative - No further assessment needed [With Patient/Caregiver] : , with patient/caregiver [Reviewed no changes] : Reviewed, no changes [I will adhere to the patient's wishes.] : I will adhere to the patient's wishes. [de-identified] : briefly in college [Audit-CScore] : 4 [de-identified] : limited with recent injury [de-identified] : well balanced [[id number]] : 0

## 2022-10-06 ENCOUNTER — APPOINTMENT (OUTPATIENT)
Dept: CT IMAGING | Facility: CLINIC | Age: 67
End: 2022-10-06

## 2022-10-06 ENCOUNTER — OUTPATIENT (OUTPATIENT)
Dept: OUTPATIENT SERVICES | Facility: HOSPITAL | Age: 67
LOS: 1 days | End: 2022-10-06
Payer: MEDICARE

## 2022-10-06 ENCOUNTER — RESULT REVIEW (OUTPATIENT)
Age: 67
End: 2022-10-06

## 2022-10-06 DIAGNOSIS — S12.000A UNSPECIFIED DISPLACED FRACTURE OF FIRST CERVICAL VERTEBRA, INITIAL ENCOUNTER FOR CLOSED FRACTURE: ICD-10-CM

## 2022-10-06 PROCEDURE — 72125 CT NECK SPINE W/O DYE: CPT

## 2022-10-06 PROCEDURE — 76376 3D RENDER W/INTRP POSTPROCES: CPT | Mod: 26

## 2022-10-06 PROCEDURE — 72125 CT NECK SPINE W/O DYE: CPT | Mod: 26,MH

## 2022-10-06 PROCEDURE — 76376 3D RENDER W/INTRP POSTPROCES: CPT

## 2022-10-18 ENCOUNTER — APPOINTMENT (OUTPATIENT)
Dept: NEUROSURGERY | Facility: CLINIC | Age: 67
End: 2022-10-18

## 2022-10-18 VITALS
TEMPERATURE: 98.3 F | WEIGHT: 155 LBS | BODY MASS INDEX: 28.52 KG/M2 | SYSTOLIC BLOOD PRESSURE: 128 MMHG | DIASTOLIC BLOOD PRESSURE: 77 MMHG | OXYGEN SATURATION: 96 % | HEART RATE: 75 BPM | HEIGHT: 62 IN

## 2022-10-18 PROCEDURE — 99213 OFFICE O/P EST LOW 20 MIN: CPT

## 2022-11-08 ENCOUNTER — RESULT REVIEW (OUTPATIENT)
Age: 67
End: 2022-11-08

## 2022-11-08 ENCOUNTER — OUTPATIENT (OUTPATIENT)
Dept: OUTPATIENT SERVICES | Facility: HOSPITAL | Age: 67
LOS: 1 days | End: 2022-11-08
Payer: MEDICARE

## 2022-11-08 ENCOUNTER — APPOINTMENT (OUTPATIENT)
Dept: CT IMAGING | Facility: CLINIC | Age: 67
End: 2022-11-08

## 2022-11-08 DIAGNOSIS — S12.000A UNSPECIFIED DISPLACED FRACTURE OF FIRST CERVICAL VERTEBRA, INITIAL ENCOUNTER FOR CLOSED FRACTURE: ICD-10-CM

## 2022-11-08 PROCEDURE — 76376 3D RENDER W/INTRP POSTPROCES: CPT

## 2022-11-08 PROCEDURE — 72125 CT NECK SPINE W/O DYE: CPT | Mod: 26,MH

## 2022-11-08 PROCEDURE — 72125 CT NECK SPINE W/O DYE: CPT

## 2022-11-08 PROCEDURE — 76376 3D RENDER W/INTRP POSTPROCES: CPT | Mod: 26

## 2022-11-15 ENCOUNTER — APPOINTMENT (OUTPATIENT)
Dept: NEUROSURGERY | Facility: CLINIC | Age: 67
End: 2022-11-15

## 2022-11-15 VITALS
HEIGHT: 62 IN | WEIGHT: 155 LBS | HEART RATE: 65 BPM | TEMPERATURE: 98.1 F | BODY MASS INDEX: 28.52 KG/M2 | SYSTOLIC BLOOD PRESSURE: 160 MMHG | DIASTOLIC BLOOD PRESSURE: 89 MMHG | OXYGEN SATURATION: 96 %

## 2022-11-15 PROCEDURE — 99212 OFFICE O/P EST SF 10 MIN: CPT

## 2022-12-14 ENCOUNTER — NON-APPOINTMENT (OUTPATIENT)
Age: 67
End: 2022-12-14

## 2022-12-14 ENCOUNTER — APPOINTMENT (OUTPATIENT)
Dept: FAMILY MEDICINE | Facility: CLINIC | Age: 67
End: 2022-12-14

## 2022-12-14 VITALS
HEART RATE: 110 BPM | BODY MASS INDEX: 29.44 KG/M2 | SYSTOLIC BLOOD PRESSURE: 170 MMHG | DIASTOLIC BLOOD PRESSURE: 102 MMHG | TEMPERATURE: 98.4 F | HEIGHT: 62 IN | OXYGEN SATURATION: 95 % | WEIGHT: 160 LBS

## 2022-12-14 VITALS — SYSTOLIC BLOOD PRESSURE: 130 MMHG | DIASTOLIC BLOOD PRESSURE: 88 MMHG

## 2022-12-14 DIAGNOSIS — Z00.00 ENCOUNTER FOR GENERAL ADULT MEDICAL EXAMINATION W/OUT ABNORMAL FINDINGS: ICD-10-CM

## 2022-12-14 DIAGNOSIS — K64.4 RESIDUAL HEMORRHOIDAL SKIN TAGS: ICD-10-CM

## 2022-12-14 PROCEDURE — 93000 ELECTROCARDIOGRAM COMPLETE: CPT

## 2022-12-14 PROCEDURE — 36415 COLL VENOUS BLD VENIPUNCTURE: CPT

## 2022-12-14 PROCEDURE — G0439: CPT

## 2022-12-14 RX ORDER — DOXYCYCLINE HYCLATE 50 MG/1
50 CAPSULE ORAL
Refills: 0 | Status: ACTIVE | COMMUNITY

## 2022-12-14 NOTE — HEALTH RISK ASSESSMENT
[Former] : Former [Yes] : Yes [4 or more  times a week (4 pts)] : 4 or more  times a week (4 points) [5 or 6 (2 pts)] : 5 or 6 (2  points) [Never (0 pts)] : Never (0 points) [No] : In the past 12 months have you used drugs other than those required for medical reasons? No [No falls in past year] : Patient reported no falls in the past year [0] : 2) Feeling down, depressed, or hopeless: Not at all (0) [PHQ-2 Negative - No further assessment needed] : PHQ-2 Negative - No further assessment needed [HIV test declined] : HIV test declined [Hepatitis C test declined] : Hepatitis C test declined [None] : None [With Family] : lives with family [On disability] : on disability [College] : College [] :  [# Of Children ___] : has [unfilled] children [Feels Safe at Home] : Feels safe at home [Fully functional (bathing, dressing, toileting, transferring, walking, feeding)] : Fully functional (bathing, dressing, toileting, transferring, walking, feeding) [Fully functional (using the telephone, shopping, preparing meals, housekeeping, doing laundry, using] : Fully functional and needs no help or supervision to perform IADLs (using the telephone, shopping, preparing meals, housekeeping, doing laundry, using transportation, managing medications and managing finances) [Smoke Detector] : smoke detector [Carbon Monoxide Detector] : carbon monoxide detector [Safety elements used in home] : safety elements used in home [Seat Belt] :  uses seat belt [Sunscreen] : uses sunscreen [With Patient/Caregiver] : , with patient/caregiver [Reviewed no changes] : Reviewed, no changes [Designated Healthcare Proxy] : Designated healthcare proxy [Name: ___] : Health Care Proxy's Name: [unfilled]  [Relationship: ___] : Relationship: [unfilled] [I will adhere to the patient's wishes.] : I will adhere to the patient's wishes. [de-identified] : quit in college [Audit-CScore] : 6 [de-identified] : none, limited because of current neck injury, walks occasionally [de-identified] : well balanced [[id number]] : 0 [Patient reported colonoscopy was normal] : Patient reported colonoscopy was normal [Change in mental status noted] : No change in mental status noted [Language] : denies difficulty with language [Sexually Active] : not sexually active [High Risk Behavior] : no high risk behavior [Reports changes in hearing] : Reports no changes in hearing [Reports changes in vision] : Reports no changes in vision [Reports changes in dental health] : Reports no changes in dental health [Travel to Developing Areas] : does not  travel to developing areas [ColonoscopyDate] : 05/2019 [FreeTextEntry2] : currently on disability for C1 fracture [ColonoscopyComments] : Dr. Kauffman.

## 2022-12-14 NOTE — HISTORY OF PRESENT ILLNESS
[FreeTextEntry1] : CPE, f/u C1 arch fx. [de-identified] : Patient is a 68yo male with PMH HTN, DOMONIQUE, B12 deficiency, Vitamin D deficiency, hx RBBB, alcohol abuse, C1 cervical fracture who presents to the office for follow up. Pt seen at our office on 06/30/2022 for hospital follow up. Pt hospitalized at [facility] from 6/2/22-6/15/22 after fall down stairs, sustained C1 arch fracture, rib fractures, PTX, radial artery laceration. Pt had alcohol withdrawal in hospital. Pt d/c to [facility], is now home and doing well. \par \par Pt had f/u CT in October.  Neurosurgery, Dr. Jensen, recommending continued C-collar use when walking and when leaving the house.  Pt is unable to work with C-collar on and is eligible for disability. \par  \par Pt is also here today for CPE. \par  \par Last CPE:  09/08/2021, Dr. COLEMAN Schaeffer. \par Colonoscopy:  05/2019, Dr. Kauffman.  Diverticula. \par Ophthalmology:  due, recommended today.  Dr. Vigil.\par Dermatology:  due, recommended today.  Dr. Raza.\par Dentist:  [facility].\par PSA:  09/08/2021, 1.22.  Pt complains of frequent urination.\par Shingles:  Shingrix x2; 01/28/2022, 04/08/2022. \par Flu:  09/2022.\par Tdap:  06/02/2022. \par PNA:  PCV-13 01/14/2022. \par COVID:  x4.\par \par Pt has history of B12 deficiency requiring IM injections in the past. \par Pt also has history of Vitamin D deficiency. \par \par Hx hemorrhoids, admits he has BRBPR frequently.  Pt was planned to have hemorrhoidectomy with Dr. Messer but had accident in June just before procedure.  Pt has not f/u with Dr. Messer since accident.\par \par Pt's wife present today in office.  Concerned about pt's memory issues.  Pt having trouble with memory and "putting things together".  Pt has appointment scheduled with Neurology, Dr. Oneil, in March.

## 2022-12-14 NOTE — ASSESSMENT
[FreeTextEntry1] : Patient is a 66yo male with PMH DOMONIQUE, B12 deficiency, Vitamin D deficiency, hx RBBB, alcohol abuse, C1 cervical fracture who presents to the office for f/u and CPE.\par \par Health Maintenance\par - Due for eye exam, Ophthalmology recommended today (Dr. Vigil).\par - Due for skin check, Dermatology recommended today (Dr. Raza).\par - EKG performed in office RBBB, tachycardic.  Suspect mild alcohol withdrawal.  Pt has never seen Cardiology before, referral provided for RBBB evaluation.\par - Fasting labs drawn in office.\par - Eat plenty of fruits and vegetables, especially deeply colored fruits/vegetables (such as leafy greens, peaches) that are more nutrient-dense.  Continue to work hard on diet and exercise, limiting/avoiding saturated fat, fatty foods, greasy foods, red meats, white flour-based carbohydrates (cookies, cakes, white bread, white rice), and added sugars.  Chose whole grain foods and products made with whole grains over refined grains and white flour-based carbohydrates.  Avoid beverages and food with added sugar.  Limit salt intake to improve blood pressure.  Limit alcohol intake.\par - Try and incorporate 30 minutes of aerobic exercise to your daily routine.\par \par Alcohol Abuse\par - Discussed alcohol abuse with patient and wife in office.  Wife, Leana, concerned that pt is drinking too much.  Is upset that he went back to drinking after detox while in hospital (pt had withdrawal in hospital with delirium requiring benzo taper).\par - Today pt is tachycardic and has tremors in the bilateral upper extremities.  No tongue fasciculations or diaphoresis.  Per wife, pt would have already had his first drink by now which is likely why he is tachycardic and tremulous.\par - RX for Ativan 0.5mg provided.  Pt to take one tablet twice a day for alcohol withdrawal symptoms but understands he should NOT take this medication if he continues to drink alcohol.\par - Pt provided with resources for detox/outpatient rehab as well.\par \par External hemorrhoids\par - Pt encouraged to f/u with Dr. Messer to discuss possible short-term treatment options while waiting to be well enough for previously proposed surgery.\par \par C1 fx\par - Pt remains with C-collar in place at least through January and therefore cannot work.\par - Will complete disability paperwork when received.\par \par Call the office or go to the ED immediately if you develop new, worsening or concerning symptoms including high fever, severe headache/worst headache of your life, confusion, dizziness/lightheadedness, loss of consciousness, severe chest pain, difficulty breathing, shortness of breath, severe abdominal pain, excessive vomiting/diarrhea, inability to feel/move the extremities, or any other concerning symptoms.\par

## 2022-12-14 NOTE — REVIEW OF SYSTEMS
[Abdominal Pain] : no abdominal pain [Nausea] : no nausea [Constipation] : no constipation [Diarrhea] : no diarrhea [Vomiting] : no vomiting [Heartburn] : no heartburn [Melena] : no melena [Dysuria] : no dysuria [Incontinence] : no incontinence [Hesitancy] : no hesitancy [Nocturia] : no nocturia [Hematuria] : no hematuria [Frequency] : frequency [Impotence] : no impotency [Poor Libido] : libido not poor [Headache] : no headache [Dizziness] : no dizziness [Fainting] : no fainting [Confusion] : no confusion [Unsteady Walk] : ataxia [Memory Loss] : memory loss [Negative] : Heme/Lymph [FreeTextEntry7] : BRBPR

## 2022-12-14 NOTE — PHYSICAL EXAM
21-Aug-2022 20:56 [No JVD] : no jugular venous distention [Regular Rhythm] : with a regular rhythm [No Murmur] : no murmur heard [No Edema] : there was no peripheral edema [Normal] : no rash [Coordination Grossly Intact] : coordination grossly intact [No Focal Deficits] : no focal deficits [Normal Gait] : normal gait [Normal Affect] : the affect was normal [Normal Insight/Judgement] : insight and judgment were intact [de-identified] : C-collar in place [de-identified] : tachycardic [de-identified] : bilateral hand tremors noted, no tongue fasciculations, no diaphoresis

## 2022-12-15 ENCOUNTER — NON-APPOINTMENT (OUTPATIENT)
Age: 67
End: 2022-12-15

## 2022-12-15 LAB
25(OH)D3 SERPL-MCNC: 49.6 NG/ML
ALBUMIN SERPL ELPH-MCNC: 4.7 G/DL
ALP BLD-CCNC: 97 U/L
ALT SERPL-CCNC: 50 U/L
ANION GAP SERPL CALC-SCNC: 15 MMOL/L
AST SERPL-CCNC: 115 U/L
BASOPHILS # BLD AUTO: 0.03 K/UL
BASOPHILS NFR BLD AUTO: 0.7 %
BILIRUB SERPL-MCNC: 0.5 MG/DL
BUN SERPL-MCNC: 8 MG/DL
CALCIUM SERPL-MCNC: 10 MG/DL
CHLORIDE SERPL-SCNC: 100 MMOL/L
CHOLEST SERPL-MCNC: 214 MG/DL
CO2 SERPL-SCNC: 24 MMOL/L
CREAT SERPL-MCNC: 0.85 MG/DL
EGFR: 95 ML/MIN/1.73M2
EOSINOPHIL # BLD AUTO: 0.02 K/UL
EOSINOPHIL NFR BLD AUTO: 0.5 %
ESTIMATED AVERAGE GLUCOSE: 97 MG/DL
FERRITIN SERPL-MCNC: 114 NG/ML
FOLATE SERPL-MCNC: >20 NG/ML
GLUCOSE SERPL-MCNC: 98 MG/DL
HBA1C MFR BLD HPLC: 5 %
HCT VFR BLD CALC: 40.4 %
HDLC SERPL-MCNC: 69 MG/DL
HGB BLD-MCNC: 13.3 G/DL
IMM GRANULOCYTES NFR BLD AUTO: 0.2 %
IRON SATN MFR SERPL: 24 %
IRON SERPL-MCNC: 85 UG/DL
LDLC SERPL CALC-MCNC: 127 MG/DL
LYMPHOCYTES # BLD AUTO: 1.5 K/UL
LYMPHOCYTES NFR BLD AUTO: 34.2 %
MAN DIFF?: NORMAL
MCHC RBC-ENTMCNC: 32 PG
MCHC RBC-ENTMCNC: 32.9 GM/DL
MCV RBC AUTO: 97.1 FL
MONOCYTES # BLD AUTO: 0.59 K/UL
MONOCYTES NFR BLD AUTO: 13.5 %
NEUTROPHILS # BLD AUTO: 2.23 K/UL
NEUTROPHILS NFR BLD AUTO: 50.9 %
NONHDLC SERPL-MCNC: 144 MG/DL
PLATELET # BLD AUTO: 175 K/UL
POTASSIUM SERPL-SCNC: 4.3 MMOL/L
PROT SERPL-MCNC: 8.1 G/DL
PSA SERPL-MCNC: 0.94 NG/ML
RBC # BLD: 4.16 M/UL
RBC # FLD: 13.3 %
SODIUM SERPL-SCNC: 140 MMOL/L
TIBC SERPL-MCNC: 360 UG/DL
TRIGL SERPL-MCNC: 83 MG/DL
TSH SERPL-ACNC: 0.27 UIU/ML
UIBC SERPL-MCNC: 275 UG/DL
VIT B12 SERPL-MCNC: >2000 PG/ML
WBC # FLD AUTO: 4.38 K/UL

## 2023-01-06 ENCOUNTER — APPOINTMENT (OUTPATIENT)
Dept: CARDIOLOGY | Facility: CLINIC | Age: 68
End: 2023-01-06
Payer: MEDICARE

## 2023-01-06 ENCOUNTER — NON-APPOINTMENT (OUTPATIENT)
Age: 68
End: 2023-01-06

## 2023-01-06 VITALS
OXYGEN SATURATION: 97 % | DIASTOLIC BLOOD PRESSURE: 84 MMHG | WEIGHT: 165 LBS | HEIGHT: 62 IN | HEART RATE: 73 BPM | SYSTOLIC BLOOD PRESSURE: 144 MMHG | BODY MASS INDEX: 30.36 KG/M2

## 2023-01-06 VITALS — DIASTOLIC BLOOD PRESSURE: 84 MMHG | SYSTOLIC BLOOD PRESSURE: 136 MMHG

## 2023-01-06 DIAGNOSIS — R01.1 CARDIAC MURMUR, UNSPECIFIED: ICD-10-CM

## 2023-01-06 DIAGNOSIS — R94.31 ABNORMAL ELECTROCARDIOGRAM [ECG] [EKG]: ICD-10-CM

## 2023-01-06 DIAGNOSIS — R55 SYNCOPE AND COLLAPSE: ICD-10-CM

## 2023-01-06 PROCEDURE — 99204 OFFICE O/P NEW MOD 45 MIN: CPT

## 2023-01-06 PROCEDURE — 93000 ELECTROCARDIOGRAM COMPLETE: CPT

## 2023-01-06 RX ORDER — LORAZEPAM 0.5 MG/1
0.5 TABLET ORAL
Qty: 20 | Refills: 0 | Status: DISCONTINUED | COMMUNITY
Start: 2022-12-14 | End: 2023-01-06

## 2023-01-06 NOTE — PHYSICAL EXAM
[No Acute Distress] : no acute distress [Normal S1, S2] : normal S1, S2 [Clear Lung Fields] : clear lung fields [Soft] : abdomen soft [Non Tender] : non-tender [Normal Gait] : normal gait [No Edema] : no edema [No Rash] : no rash [Moves all extremities] : moves all extremities [Normal Speech] : normal speech [Alert and Oriented] : alert and oriented [de-identified] :  appears his stated age [de-identified] :  pupils round [de-identified] :  wearing a face mask [de-identified] :  wearing a cervical neck collar [de-identified] : II/VI systolic murmur

## 2023-01-06 NOTE — HISTORY OF PRESENT ILLNESS
[FreeTextEntry1] : Dandy Jenkins is a 67-year-old  retired  with a history of hypercholesterolemia (recently prescribed atorvastatin), hypertension, right bundle branch block, psoriatic arthritis, vitamin deficiencies, alcohol abuse, traumatic injuries after falling down stairs in June 2022 (cervical spine fracture, rib fractures, pneumothorax, and radial artery laceration––hospitalization complicated by alcohol withdrawal), who presents for cardiology consultation.  He was sent for consultation because of mild tachycardia and right bundle branch block seen during a recent visit at his primary care physician's office.  He has no history of heart disease and describes a good baseline functional status although still recovering from cervical spine fracture and wearing a hard neck collar.  He has not been experiencing angina, dyspnea, or palpitations.  His fall was preceded by a syncopal event–admits that he was intoxicated prior to fall/collapse (no recurrence).   Cephalexin Pregnancy And Lactation Text: This medication is Pregnancy Category B and considered safe during pregnancy.  It is also excreted in breast milk but can be used safely for shorter doses.

## 2023-01-06 NOTE — DISCUSSION/SUMMARY
[FreeTextEntry1] : \par Abnormal ECG: Right bundle branch block–has been present since at least September 2021; I recommend echocardiography to assess for structural heart abnormalities.\par \par Heart murmur: Suspicious for aortic stenosis; return for echocardiogram–I will contact patient by telephone to discuss results.\par \par Syncope: May have been related to alcohol use; he denies recurrent syncope; echocardiogram to evaluate aortic valve.  I encouraged decrease consumption of alcohol.\par \par Hyperlipidemia: Agree with initiation of atorvastatin; would repeat lipid panel in the upcoming months to assess efficacy of atorvastatin 10 mg.\par \par Hypertension: Blood pressure mildly elevated; may need initiation of pharmacotherapy if persistent --  he plans to follow-up with Dr. Schaeffer

## 2023-01-06 NOTE — REVIEW OF SYSTEMS
[Weight Gain (___ Lbs)] : [unfilled] ~Ulb weight gain [Negative] : Heme/Lymph [SOB] : no shortness of breath [Dyspnea on exertion] : not dyspnea during exertion [Chest Discomfort] : no chest discomfort [Syncope] : no syncope [FreeTextEntry4] :  wearing a cervical spine collar [de-identified] :  admits to consuming "too much" alcohol

## 2023-01-13 ENCOUNTER — NON-APPOINTMENT (OUTPATIENT)
Age: 68
End: 2023-01-13

## 2023-01-13 ENCOUNTER — APPOINTMENT (OUTPATIENT)
Dept: CARDIOLOGY | Facility: CLINIC | Age: 68
End: 2023-01-13
Payer: MEDICARE

## 2023-01-13 PROCEDURE — 93306 TTE W/DOPPLER COMPLETE: CPT

## 2023-01-16 ENCOUNTER — RESULT REVIEW (OUTPATIENT)
Age: 68
End: 2023-01-16

## 2023-01-16 ENCOUNTER — APPOINTMENT (OUTPATIENT)
Dept: CT IMAGING | Facility: CLINIC | Age: 68
End: 2023-01-16
Payer: MEDICARE

## 2023-01-16 ENCOUNTER — OUTPATIENT (OUTPATIENT)
Dept: OUTPATIENT SERVICES | Facility: HOSPITAL | Age: 68
LOS: 1 days | End: 2023-01-16
Payer: MEDICARE

## 2023-01-16 DIAGNOSIS — S12.000A UNSPECIFIED DISPLACED FRACTURE OF FIRST CERVICAL VERTEBRA, INITIAL ENCOUNTER FOR CLOSED FRACTURE: ICD-10-CM

## 2023-01-16 PROCEDURE — 76376 3D RENDER W/INTRP POSTPROCES: CPT

## 2023-01-16 PROCEDURE — 72125 CT NECK SPINE W/O DYE: CPT | Mod: 26,MH

## 2023-01-16 PROCEDURE — 72125 CT NECK SPINE W/O DYE: CPT

## 2023-01-16 PROCEDURE — 76376 3D RENDER W/INTRP POSTPROCES: CPT | Mod: 26

## 2023-01-23 ENCOUNTER — FORM ENCOUNTER (OUTPATIENT)
Age: 68
End: 2023-01-23

## 2023-01-24 ENCOUNTER — APPOINTMENT (OUTPATIENT)
Dept: NEUROSURGERY | Facility: CLINIC | Age: 68
End: 2023-01-24
Payer: MEDICARE

## 2023-01-24 VITALS
SYSTOLIC BLOOD PRESSURE: 146 MMHG | DIASTOLIC BLOOD PRESSURE: 88 MMHG | OXYGEN SATURATION: 96 % | WEIGHT: 165 LBS | HEIGHT: 62 IN | TEMPERATURE: 98.7 F | BODY MASS INDEX: 30.36 KG/M2 | HEART RATE: 78 BPM

## 2023-01-24 DIAGNOSIS — S12.000A UNSPECIFIED DISPLACED FRACTURE OF FIRST CERVICAL VERTEBRA, INITIAL ENCOUNTER FOR CLOSED FRACTURE: ICD-10-CM

## 2023-01-24 PROCEDURE — 99212 OFFICE O/P EST SF 10 MIN: CPT

## 2023-01-28 ENCOUNTER — OUTPATIENT (OUTPATIENT)
Dept: OUTPATIENT SERVICES | Facility: HOSPITAL | Age: 68
LOS: 1 days | Discharge: HOME | End: 2023-01-28

## 2023-01-28 DIAGNOSIS — D64.9 ANEMIA, UNSPECIFIED: ICD-10-CM

## 2023-02-07 ENCOUNTER — RESULT REVIEW (OUTPATIENT)
Age: 68
End: 2023-02-07

## 2023-02-07 ENCOUNTER — APPOINTMENT (OUTPATIENT)
Dept: HEMATOLOGY ONCOLOGY | Facility: CLINIC | Age: 68
End: 2023-02-07
Payer: MEDICARE

## 2023-02-07 VITALS
WEIGHT: 162.19 LBS | DIASTOLIC BLOOD PRESSURE: 84 MMHG | RESPIRATION RATE: 18 BRPM | SYSTOLIC BLOOD PRESSURE: 153 MMHG | HEART RATE: 74 BPM | HEIGHT: 62 IN | OXYGEN SATURATION: 97 % | TEMPERATURE: 98.4 F | BODY MASS INDEX: 29.85 KG/M2

## 2023-02-07 DIAGNOSIS — Z86.2 PERSONAL HISTORY OF DISEASES OF THE BLOOD AND BLOOD-FORMING ORGANS AND CERTAIN DISORDERS INVOLVING THE IMMUNE MECHANISM: ICD-10-CM

## 2023-02-07 DIAGNOSIS — E53.8 DEFICIENCY OF OTHER SPECIFIED B GROUP VITAMINS: ICD-10-CM

## 2023-02-07 LAB
BASOPHILS # BLD AUTO: 0.03 K/UL — SIGNIFICANT CHANGE UP (ref 0–0.2)
BASOPHILS NFR BLD AUTO: 0.9 % — SIGNIFICANT CHANGE UP (ref 0–2)
EOSINOPHIL # BLD AUTO: 0.06 K/UL — SIGNIFICANT CHANGE UP (ref 0–0.5)
EOSINOPHIL NFR BLD AUTO: 1.9 % — SIGNIFICANT CHANGE UP (ref 0–6)
HCT VFR BLD CALC: 34.6 % — LOW (ref 39–50)
HGB BLD-MCNC: 11.8 G/DL — LOW (ref 13–17)
IMM GRANULOCYTES NFR BLD AUTO: 0.3 % — SIGNIFICANT CHANGE UP (ref 0–0.9)
LYMPHOCYTES # BLD AUTO: 1.3 K/UL — SIGNIFICANT CHANGE UP (ref 1–3.3)
LYMPHOCYTES # BLD AUTO: 40.6 % — SIGNIFICANT CHANGE UP (ref 13–44)
MCHC RBC-ENTMCNC: 31.1 PG — SIGNIFICANT CHANGE UP (ref 27–34)
MCHC RBC-ENTMCNC: 34.1 GM/DL — SIGNIFICANT CHANGE UP (ref 32–36)
MCV RBC AUTO: 91.1 FL — SIGNIFICANT CHANGE UP (ref 80–100)
MONOCYTES # BLD AUTO: 0.25 K/UL — SIGNIFICANT CHANGE UP (ref 0–0.9)
MONOCYTES NFR BLD AUTO: 7.8 % — SIGNIFICANT CHANGE UP (ref 2–14)
NEUTROPHILS # BLD AUTO: 1.55 K/UL — LOW (ref 1.8–7.4)
NEUTROPHILS NFR BLD AUTO: 48.5 % — SIGNIFICANT CHANGE UP (ref 43–77)
NRBC # BLD: 0 /100 WBCS — SIGNIFICANT CHANGE UP (ref 0–0)
PLATELET # BLD AUTO: 146 K/UL — LOW (ref 150–400)
RBC # BLD: 3.8 M/UL — LOW (ref 4.2–5.8)
RBC # FLD: 13.4 % — SIGNIFICANT CHANGE UP (ref 10.3–14.5)
WBC # BLD: 3.2 K/UL — LOW (ref 3.8–10.5)
WBC # FLD AUTO: 3.2 K/UL — LOW (ref 3.8–10.5)

## 2023-02-07 PROCEDURE — 99213 OFFICE O/P EST LOW 20 MIN: CPT

## 2023-02-07 NOTE — PHYSICAL EXAM
[de-identified] : anicteric [de-identified] : no c/c/e [Normal] : affect appropriate [de-identified] : looks well  [de-identified] : no rashes or bruises.

## 2023-02-07 NOTE — PHYSICAL EXAM
[de-identified] : anicteric [de-identified] : no c/c/e [Normal] : affect appropriate [de-identified] : looks well  [de-identified] : no rashes or bruises.

## 2023-02-07 NOTE — HISTORY OF PRESENT ILLNESS
[de-identified] : MARQUITA MARTINI is a 67 y.o. M with a PMH significant for HTN, psoriasis, psoriatic arthritis, fatty liver, and alcohol abuse, who we are following for a multifactorial anemia. \par \par 10/17/19 - Initially seen in our office for a microcytic anemia (Dr. Benítez). \par Hgb: 11.3,  Hct: 37.2,  MCV: 81.4,  Ferritin: 22,  B12: 225.  \par Diagnosed with B12 deficiency - Was started on B12 injections, then OTC B12.   \par Ferritin not addressed. \par \par 11/19/19 - Hgb: 10.1, Hct: 32.7, MCV: 82.4, Folate: 3.2, Ferritin: 15\par Diagnosed with Folate deficiency - Folic acid ordered.  \par Was found to have at least 2 alcoholic drinks per day - informed of marrow - toxic effects of alcohol and advised to cut back. \par Consider IV iron. \par \par 2/22/21 - Hgb: 12.9, Hct: 38.2, MCV: 89.7 Folate: >20, B12: >2000, Ferritin: 37\par 8/3/21 -   Hgb: 12.9, Hct: 38.3, MCV: 95.5  \par \par 6/2/22 - 6/15/22 - Hospitalized after fell down ~ 15 steps while intoxicated.  Had closed C1 fracture and closed rib fracture.  Hospital course was complicated by EtOH withdrawal.  D/C'ed to [facility] -> 6/28/22. \par Prior to this was scheduled to have hemorrhoid surgery at [facility] on 6/7/22 with Dr. Willie Messer. \par \par 8/1/22 - WBC: 3.43,  ANC: 1.22   Hgb: 10.7, Hct: 33.0, MCV: 94.8,  ESR: 52, creat: 0.67\par \par GI eval:\par Colonoscopy 5/2019 (Jamari) - essentially normal, no evidence of bleeding. \par EGD 5/2019 - MIld gastritis.  Repeated 6/2019 - Obstructing Schatzki ring at GE junction -  dilated. \par \par S/p oral iron. [de-identified] : Feels well. Took oral iron for few months as recommended. No longer on oral iron. Energy is good. Neck brace was removed. More active Overall feels well \par Continues folic acid and B 12

## 2023-02-07 NOTE — ASSESSMENT
[FreeTextEntry1] :  67 y.o. male with hx of  multifactorial anemia -  B12 deficiency, folate deficiency, iron deficiency, and marrow toxicity from daily EtOH use. anemia of chronic disease due to his rheumatologic issues. \par B12  and folate levels have normalized since oral supplementation. \par \par Colonoscopy 5/2019 (Jamari) - essentially normal, no evidence of bleeding. \par EGD 5/2019 - MIld gastritis.  Repeated 6/2019 - Obstructing Schatzki ring at GE junction -  dilated. \par Was preop for hemorrhoid surgery prior to recent hospitalization - iron deficiency can be from hx of bleeding hemorrhoids, or ? bleeding 2nd chronic Meloxicam use/ EtOH use. \par \par S/p course of oral iron with improvement.\par \par No longer on iron.\par \par Counts improved. Mild borderline anemia- not clinically significant.\par Cont folic acid and B12.\par Abstinence from alcohol.\par Monitor iron studies periodically.\par \par return visit in one year sooner PRN\par  \par

## 2023-02-08 DIAGNOSIS — Z86.2 PERSONAL HISTORY OF DISEASES OF THE BLOOD AND BLOOD-FORMING ORGANS AND CERTAIN DISORDERS INVOLVING THE IMMUNE MECHANISM: ICD-10-CM

## 2023-02-08 DIAGNOSIS — E53.8 DEFICIENCY OF OTHER SPECIFIED B GROUP VITAMINS: ICD-10-CM

## 2023-02-08 LAB
ERYTHROCYTE [SEDIMENTATION RATE] IN BLOOD: 33 MM/HR — HIGH (ref 0–20)
FERRITIN SERPL-MCNC: 87 NG/ML — SIGNIFICANT CHANGE UP (ref 30–400)
IRON SATN MFR SERPL: 19 % — SIGNIFICANT CHANGE UP (ref 16–55)
IRON SATN MFR SERPL: 60 UG/DL — SIGNIFICANT CHANGE UP (ref 45–165)
TIBC SERPL-MCNC: 325 UG/DL — SIGNIFICANT CHANGE UP (ref 220–430)
UIBC SERPL-MCNC: 265 UG/DL — SIGNIFICANT CHANGE UP (ref 110–370)

## 2023-02-10 LAB — VIT C SERPL-MCNC: <0.1 MG/DL — LOW (ref 0.4–2)

## 2023-03-08 ENCOUNTER — APPOINTMENT (OUTPATIENT)
Dept: NEUROLOGY | Facility: CLINIC | Age: 68
End: 2023-03-08
Payer: MEDICARE

## 2023-03-08 VITALS
SYSTOLIC BLOOD PRESSURE: 114 MMHG | BODY MASS INDEX: 30.36 KG/M2 | DIASTOLIC BLOOD PRESSURE: 70 MMHG | WEIGHT: 165 LBS | HEIGHT: 62 IN

## 2023-03-08 DIAGNOSIS — R41.3 OTHER AMNESIA: ICD-10-CM

## 2023-03-08 PROCEDURE — 99204 OFFICE O/P NEW MOD 45 MIN: CPT

## 2023-03-08 RX ORDER — MELOXICAM 15 MG/1
15 TABLET ORAL
Qty: 30 | Refills: 6 | Status: COMPLETED | COMMUNITY
Start: 2017-04-19 | End: 2023-03-08

## 2023-03-10 LAB
FOLATE SERPL-MCNC: >20 NG/ML
TSH SERPL-ACNC: 0.35 UIU/ML
VIT B12 SERPL-MCNC: 1958 PG/ML

## 2023-03-13 ENCOUNTER — APPOINTMENT (OUTPATIENT)
Dept: NEUROLOGY | Facility: CLINIC | Age: 68
End: 2023-03-13
Payer: MEDICARE

## 2023-03-13 ENCOUNTER — RX RENEWAL (OUTPATIENT)
Age: 68
End: 2023-03-13

## 2023-03-13 PROCEDURE — 93040 RHYTHM ECG WITH REPORT: CPT

## 2023-03-13 PROCEDURE — 95819 EEG AWAKE AND ASLEEP: CPT

## 2023-03-14 LAB — METHYLMALONATE SERPL-SCNC: 82 NMOL/L

## 2023-03-16 ENCOUNTER — APPOINTMENT (OUTPATIENT)
Dept: MRI IMAGING | Facility: CLINIC | Age: 68
End: 2023-03-16
Payer: MEDICARE

## 2023-03-16 ENCOUNTER — OUTPATIENT (OUTPATIENT)
Dept: OUTPATIENT SERVICES | Facility: HOSPITAL | Age: 68
LOS: 1 days | End: 2023-03-16
Payer: MEDICARE

## 2023-03-16 DIAGNOSIS — R41.3 OTHER AMNESIA: ICD-10-CM

## 2023-03-16 PROCEDURE — 70551 MRI BRAIN STEM W/O DYE: CPT | Mod: 26,MH

## 2023-03-16 PROCEDURE — 70551 MRI BRAIN STEM W/O DYE: CPT

## 2023-03-16 NOTE — ASSESSMENT
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

## 2023-03-16 NOTE — HISTORY OF PRESENT ILLNESS
[FreeTextEntry1] : He is here with his wife.\par They report that he has had some memory loss going on for about the last year.  On 6/2/2022 he suffered a fall with a C1 fracture and was followed by neurosurgery.  No significant head injury.  Memory loss seems to have accelerated following the fall.  Wife says he is very repetitive.  He manages fine with activities of daily living.  He has not had anything that would cause danger to himself or others.  He drives without a problem.\par \par He is a heavy alcohol user drinking 4-5 scotch tumblers a day for many years.  Non-smoker\par He is a retired \par Medical history otherwise significant for hyperlipidemia

## 2023-03-16 NOTE — PHYSICAL EXAM
[General Appearance - Alert] : alert [General Appearance - In No Acute Distress] : in no acute distress [General Appearance - Well-Appearing] : healthy appearing [Oriented To Time, Place, And Person] : oriented to person, place, and time [Impaired Insight] : insight and judgment were intact [Affect] : the affect was normal [Memory Recent] : recent memory was not impaired [Person] : oriented to person [Place] : oriented to place [Time] : oriented to time [Short Term Intact] : short term memory intact [Remote Intact] : remote memory intact [Registration Intact] : recent registration memory intact [Concentration Intact] : normal concentrating ability [Visual Intact] : visual attention was ~T not ~L decreased [Naming Objects] : no difficulty naming common objects [Repeating Phrases] : no difficulty repeating a phrase [Fluency] : fluency intact [Comprehension] : comprehension intact [Current Events] : adequate knowledge of current events [Past History] : adequate knowledge of personal past history [Cranial Nerves Optic (II)] : visual acuity intact bilaterally,  visual fields full to confrontation, pupils equal round and reactive to light [Cranial Nerves Oculomotor (III)] : extraocular motion intact [Cranial Nerves Trigeminal (V)] : facial sensation intact symmetrically [Cranial Nerves Facial (VII)] : face symmetrical [Cranial Nerves Vestibulocochlear (VIII)] : hearing was intact bilaterally [Cranial Nerves Glossopharyngeal (IX)] : tongue and palate midline [Cranial Nerves Accessory (XI - Cranial And Spinal)] : head turning and shoulder shrug symmetric [Cranial Nerves Hypoglossal (XII)] : there was no tongue deviation with protrusion [Motor Tone] : muscle tone was normal in all four extremities [Motor Strength] : muscle strength was normal in all four extremities [No Muscle Atrophy] : normal bulk in all four extremities [Sensation Tactile Decrease] : light touch was intact [Sensation Pain / Temperature Decrease] : pain and temperature was intact [Abnormal Walk] : normal gait [Balance] : balance was intact [2+] : Ankle jerk left 2+ [PERRL With Normal Accommodation] : pupils were equal in size, round, reactive to light, with normal accommodation [Extraocular Movements] : extraocular movements were intact [Full Visual Field] : full visual field [Paresis Pronator Drift Right-Sided] : no pronator drift on the right [Paresis Pronator Drift Left-Sided] : no pronator drift on the left [Romberg's Sign] : Romberg's sign was negtive [Past-pointing] : there was no past-pointing [Tremor] : no tremor present [Coordination - Dysmetria Impaired Finger-to-Nose Bilateral] : not present [Coordination - Dysmetria Impaired Heel-to-Shin Bilateral] : not present [Plantar Reflex Right Only] : normal on the right [Plantar Reflex Left Only] : normal on the left [FreeTextEntry4] : Short-term recall 3/3, able to name president but not

## 2023-03-20 LAB — T4 FREE SERPL DIALY-MCNC: 1 NG/DL

## 2023-04-10 ENCOUNTER — APPOINTMENT (OUTPATIENT)
Dept: FAMILY MEDICINE | Facility: CLINIC | Age: 68
End: 2023-04-10
Payer: MEDICARE

## 2023-04-10 ENCOUNTER — MED ADMIN CHARGE (OUTPATIENT)
Age: 68
End: 2023-04-10

## 2023-04-10 VITALS
SYSTOLIC BLOOD PRESSURE: 140 MMHG | TEMPERATURE: 98.1 F | OXYGEN SATURATION: 94 % | HEART RATE: 98 BPM | HEIGHT: 62 IN | WEIGHT: 162 LBS | DIASTOLIC BLOOD PRESSURE: 80 MMHG | BODY MASS INDEX: 29.81 KG/M2

## 2023-04-10 VITALS — DIASTOLIC BLOOD PRESSURE: 80 MMHG | SYSTOLIC BLOOD PRESSURE: 124 MMHG

## 2023-04-10 DIAGNOSIS — R79.89 OTHER SPECIFIED ABNORMAL FINDINGS OF BLOOD CHEMISTRY: ICD-10-CM

## 2023-04-10 PROCEDURE — 36415 COLL VENOUS BLD VENIPUNCTURE: CPT

## 2023-04-10 PROCEDURE — 99214 OFFICE O/P EST MOD 30 MIN: CPT | Mod: 25

## 2023-04-10 PROCEDURE — 90677 PCV20 VACCINE IM: CPT

## 2023-04-10 PROCEDURE — G0009: CPT

## 2023-04-10 NOTE — HISTORY OF PRESENT ILLNESS
[FreeTextEntry1] : Follow up HLD. [de-identified] : Patient is a 68yo male with PMH HTN, HLD, alcoholism, psoriatic arthritis who presents to the office for follow up. \par \par Pt currently taking Atorvastatin 10mg daily for HLD.  Started on Atorvastatin in 12/2022 after ASCVD risk elevated at 13.33%. \par  \par Diet:  fair, room for improvement. \par Exercise:  limited, active around the home.\par Cardiology:  Dr. Bahena.\par \par Pt evaluated by Neurology, Dr. 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  MRI, EEG, and labs normal.  Recommended alcohol cessation. \par Pt continues to drink alcohol on regular basis.\par \par Pt requesting Prevnar-20 today.  Pt received Prevnar-13 in 01/2022.

## 2023-04-10 NOTE — ASSESSMENT
[FreeTextEntry1] : Patient is a 68yo male with PMH HTN, HLD, alcoholism, psoriatic arthritis who presents to the office for follow up. \par \par HLD\par - Fasting labs drawn in office.\par - Continue Atorvastatin 10mg nightly.\par - Limit/avoid greasy foods, fried foods, fatty foods, and saturated fat in your diet and try and eat more lean proteins.\par \par HTN\par - BP stable in office today.\par - Monitor blood pressure at home, keep log, alert office if too high/too low.\par - DASH diet encouraged, regular exercise encouraged.\par - Alert office if you develop any new, worsening or concerning symptoms including headache, vision changes, dizziness, loss of consciousness, chest pain, shortness of breath, or lower extremity edema.\par \par Alcoholism\par - Pt continues to drink ~5 drinks daily.\par - Pt will try and cut back on his own.  Offered outpatient services but pt declines for now.  Will make more of an effort to cut back on alcohol but understands he should not stop all at once given hx DTs.\par \par Call the office or go to the ED immediately if you develop new, worsening or concerning symptoms including high fever, severe headache/worst headache of your life, confusion, dizziness/lightheadedness, loss of consciousness, severe chest pain, difficulty breathing, shortness of breath, severe abdominal pain, excessive vomiting/diarrhea, inability to feel/move the extremities, or any other concerning symptoms.\par

## 2023-04-10 NOTE — HEALTH RISK ASSESSMENT
[Yes] : Yes [4 or more  times a week (4 pts)] : 4 or more  times a week (4 points) [5 or 6 (2 pts)] : 5 or 6 (2  points) [Never (0 pts)] : Never (0 points) [No] : In the past 12 months have you used drugs other than those required for medical reasons? No [0] : 2) Feeling down, depressed, or hopeless: Not at all (0) [PHQ-2 Negative - No further assessment needed] : PHQ-2 Negative - No further assessment needed [Former] : Former [5-9] : 5-9 [> 15 Years] : > 15 Years [Audit-CScore] : 6 [de-identified] : limited [de-identified] : room for improvement [[id number]] : 0

## 2023-04-11 ENCOUNTER — NON-APPOINTMENT (OUTPATIENT)
Age: 68
End: 2023-04-11

## 2023-04-11 ENCOUNTER — APPOINTMENT (OUTPATIENT)
Dept: NEUROLOGY | Facility: CLINIC | Age: 68
End: 2023-04-11

## 2023-04-11 LAB
ALBUMIN SERPL ELPH-MCNC: 4.5 G/DL
ALP BLD-CCNC: 100 U/L
ALT SERPL-CCNC: 27 U/L
ANION GAP SERPL CALC-SCNC: 16 MMOL/L
AST SERPL-CCNC: 60 U/L
BILIRUB SERPL-MCNC: 0.7 MG/DL
BUN SERPL-MCNC: 8 MG/DL
CALCIUM SERPL-MCNC: 10 MG/DL
CHLORIDE SERPL-SCNC: 104 MMOL/L
CHOLEST SERPL-MCNC: 163 MG/DL
CO2 SERPL-SCNC: 21 MMOL/L
CREAT SERPL-MCNC: 0.63 MG/DL
EGFR: 104 ML/MIN/1.73M2
GLUCOSE SERPL-MCNC: 103 MG/DL
HDLC SERPL-MCNC: 75 MG/DL
LDLC SERPL CALC-MCNC: 73 MG/DL
NONHDLC SERPL-MCNC: 88 MG/DL
POTASSIUM SERPL-SCNC: 4 MMOL/L
PROT SERPL-MCNC: 7.6 G/DL
SODIUM SERPL-SCNC: 141 MMOL/L
TRIGL SERPL-MCNC: 77 MG/DL

## 2023-06-21 ENCOUNTER — RX RENEWAL (OUTPATIENT)
Age: 68
End: 2023-06-21

## 2023-07-18 ENCOUNTER — RX RENEWAL (OUTPATIENT)
Age: 68
End: 2023-07-18

## 2023-08-21 ENCOUNTER — APPOINTMENT (OUTPATIENT)
Dept: RHEUMATOLOGY | Facility: CLINIC | Age: 68
End: 2023-08-21
Payer: MEDICARE

## 2023-08-21 VITALS
TEMPERATURE: 98 F | WEIGHT: 158 LBS | DIASTOLIC BLOOD PRESSURE: 70 MMHG | BODY MASS INDEX: 29.08 KG/M2 | HEIGHT: 62 IN | HEART RATE: 70 BPM | SYSTOLIC BLOOD PRESSURE: 120 MMHG | OXYGEN SATURATION: 96 %

## 2023-08-21 DIAGNOSIS — E55.9 VITAMIN D DEFICIENCY, UNSPECIFIED: ICD-10-CM

## 2023-08-21 PROCEDURE — 99214 OFFICE O/P EST MOD 30 MIN: CPT

## 2023-08-23 LAB
25(OH)D3 SERPL-MCNC: 44.7 NG/ML
ALBUMIN SERPL ELPH-MCNC: 4.6 G/DL
ALP BLD-CCNC: 97 U/L
ALT SERPL-CCNC: 32 U/L
ANION GAP SERPL CALC-SCNC: 15 MMOL/L
AST SERPL-CCNC: 76 U/L
BILIRUB SERPL-MCNC: 0.4 MG/DL
BUN SERPL-MCNC: 9 MG/DL
CALCIUM SERPL-MCNC: 9.8 MG/DL
CHLORIDE SERPL-SCNC: 106 MMOL/L
CO2 SERPL-SCNC: 22 MMOL/L
CREAT SERPL-MCNC: 0.67 MG/DL
CRP SERPL-MCNC: <3 MG/L
EGFR: 102 ML/MIN/1.73M2
ERYTHROCYTE [SEDIMENTATION RATE] IN BLOOD BY WESTERGREN METHOD: 43 MM/HR
HCT VFR BLD CALC: 39.2 %
HGB BLD-MCNC: 12.7 G/DL
MCHC RBC-ENTMCNC: 31.6 PG
MCHC RBC-ENTMCNC: 32.4 GM/DL
MCV RBC AUTO: 97.5 FL
PLATELET # BLD AUTO: 144 K/UL
POTASSIUM SERPL-SCNC: 4.5 MMOL/L
PROT SERPL-MCNC: 7.6 G/DL
RBC # BLD: 4.02 M/UL
RBC # FLD: 13.2 %
SODIUM SERPL-SCNC: 143 MMOL/L
WBC # FLD AUTO: 3.86 K/UL

## 2023-08-23 RX ORDER — FOLIC ACID 1 MG/1
1 TABLET ORAL DAILY
Qty: 90 | Refills: 3 | Status: ACTIVE | COMMUNITY
Start: 2019-11-25 | End: 1900-01-01

## 2023-08-23 NOTE — REVIEW OF SYSTEMS
[Abdominal Pain] : abdominal pain [Diarrhea] : diarrhea [Arthralgias] : arthralgias [Negative] : Heme/Lymph [Fever] : no fever [Chills] : no chills [Feeling Poorly] : not feeling poorly [Feeling Tired] : not feeling tired [Recent Weight Gain (___ Lbs)] : no recent weight gain [Recent Weight Loss (___ Lbs)] : no recent weight loss [Eye Pain] : no eye pain [Red Eyes] : eyes not red [Eyesight Problems] : no eyesight problems [Discharge From Eyes] : no purulent discharge from the eyes [Dry Eyes] : no dryness of the eyes [Eyes Itch] : no itching of the eyes [Earache] : no earache [Loss Of Hearing] : no hearing loss [Heart Rate Is Slow] : the heart rate was not slow [Heart Rate Is Fast] : the heart rate was not fast [Chest Pain] : no chest pain [Palpitations] : no palpitations [Leg Claudication] : no intermittent leg claudication [Lower Ext Edema] : no extremity edema [Shortness Of Breath] : no shortness of breath [Wheezing] : no wheezing [Cough] : no cough [SOB on Exertion] : no shortness of breath during exertion [Orthopnea] : no orthopnea [PND] : no PND [Vomiting] : no vomiting [Constipation] : no constipation [Heartburn] : no heartburn [Melena] : no melena [Joint Pain] : no joint pain [Joint Swelling] : no joint swelling [Joint Stiffness] : no joint stiffness [Itching] : no itching [de-identified] : much improved. Psoriasis

## 2023-08-23 NOTE — ASSESSMENT
[FreeTextEntry1] : Mr. Jenkins is a 65-year-old PMHX HTN, Ps/PsA  #PSA dx 2016 now controlled on combination of SSZ and otezla. chronic deformities without acute changes -- check inflammatory markers -- continue otezla   #cervical fracture  -- f/u neuro sx  # vitamin deficiencies  -- check vitamin c -- check vitamin d  #medicine monitoring. long term use of drug -- check labs -- Quant tb negative August 2022 - recheck  #inc LFT.  likely 2/2 dx with fatty liver, he does consume ETOH on a daily basis.  -- monitor closely given meds  #Anemia- chronic, possibly mixed picture with normal mcv - now being treated for b12 def (replete)  - will f/u with heme      More than 50% of the encounter was spent counseling the patient on differential, workup, disease course and treatment/management.  Education was provided to the patient during this encounter.  All questions and concerns were addressed and answered.   The patient verbalized understanding and agreed to the plan.   Patient has been instructed to call for an appointment if new symptoms develop. Patient has been instructed to make a followup appointment in 3 months.  Time spent on the encounter included, but is not limited to, preparing to see the patient, obtaining and/or reviewing separately obtained history, performing the evaluation, counseling and educating, independently interpreting results with communication to patient, order placement, referring and/or communicating with other health professionals as described, and documenting clinical information in the electronic health record

## 2023-08-23 NOTE — HISTORY OF PRESENT ILLNESS
[FreeTextEntry1] : Dandy is a 66-year-old PMHX HTN, Ps/PsA  Ps/PsA.  dx 2016.  presented with skin, diffuse joint pain and swelling - has been on SSZ and otezla  INTERVAL HX - here after a year  - was going to move but has since decided to stay a while  - doing really well in terms of the skin and the joints  - adherent to his meds - denies AE to his meds  - no new symptoms or issues - no swelling or limitation

## 2023-08-29 LAB — VIT C SERPL-MCNC: 0.2 MG/DL

## 2023-08-31 ENCOUNTER — APPOINTMENT (OUTPATIENT)
Age: 68
End: 2023-08-31
Payer: MEDICARE

## 2023-08-31 PROCEDURE — 99213 OFFICE O/P EST LOW 20 MIN: CPT

## 2023-10-22 NOTE — REVIEW OF SYSTEMS
" Patient ID: Leopold A Dawson is a 71 y.o. male.    Chief Complaint: Annual Exam    HPI      Leopold A Dawson is a 71 y.o. male. here for annual exam.   No current complaints.  Follow-up for type 2 diabetes aortic athero sclerosis hypertension and reflux.  All stable this time.        Review of Symptoms    Constitutional: Negative.    HENT: Negative.    Eyes: Negative.    Respiratory: Negative.    Cardiovascular: Negative.    Gastrointestinal: Negative.    Endocrine: Negative.    Genitourinary: Negative.    Musculoskeletal: Negative.    Skin: Negative.    Allergic/Immunologic: Negative.    Neurological: Negative.    Hematological: Negative.    Psychiatric/Behavioral: Negative.      Except as above in HPI      Vitals:    10/17/23 1125   BP: 116/80   BP Location: Left arm   Patient Position: Sitting   Pulse: 83   Temp: 98.3 °F (36.8 °C)   TempSrc: Oral   SpO2: 98%   Weight: 95.7 kg (210 lb 15.7 oz)   Height: 5' 11" (1.803 m)        Physical  Exam      Constitutional:  Oriented to person, place, and time. Appears well-developed and well-nourished.     HENT:   Head: Normocephalic and atraumatic.     Right Ear: Tympanic membrane, ear canal and External ear normal     Left Ear: Tympanic membrane, ear canal and External ear normal     Nose: Nose normal. No rhinorrhea or nasal deformity.     Mouth/Throat: Uvula is midline, oropharynx is clear and moist and mucous membranes are normal.      Eyes: Conjunctivae are normal. Right eye exhibits no discharge. Left eye exhibits no discharge. No scleral icterus.     Neck:  No JVD present. No tracheal deviation  []  Neck supple.   []  No Carotid bruit    Cardiovascular:  Regular rate and rhythm with normal S1 and S2     Pulmonary/Chest:   Clear to auscultation bilaterally without wheezes, rhonchi or rales    Musculoskeletal: Normal range of motion. No edema or tenderness.   No deformity     Lymphadenopathy:  No cervical adenopathy.     Neurological:  Alert and oriented to person, " place, and time. Coordination normal.     Skin: Skin is warm and dry. No rash noted.     Psychiatric: Normal mood and affect. Speech is normal and behavior is normal. Judgment and thought content normal.     Complete Blood Count  Lab Results   Component Value Date    RBC 4.12 (L) 05/22/2023    HGB 11.7 (L) 05/22/2023    HCT 37.4 (L) 05/22/2023    MCV 91 05/22/2023    MCH 28.4 05/22/2023    MCHC 31.3 (L) 05/22/2023    RDW 12.5 05/22/2023     05/22/2023    MPV 10.5 05/22/2023    GRAN 3.1 05/22/2023    GRAN 54.6 05/22/2023    LYMPH 1.9 05/22/2023    LYMPH 33.4 05/22/2023    MONO 0.5 05/22/2023    MONO 8.7 05/22/2023    EOS 0.1 05/22/2023    BASO 0.04 05/22/2023    EOSINOPHIL 2.4 05/22/2023    BASOPHIL 0.7 05/22/2023    DIFFMETHOD Automated 05/22/2023       Comprehensive Metabolic Panel  Lab Results   Component Value Date     (H) 05/22/2023    BUN 21 (H) 05/22/2023    CREATININE 1.21 05/22/2023     05/22/2023    K 4.1 05/22/2023     05/22/2023    PROT 7.0 05/22/2023    ALBUMIN 4.2 05/22/2023    BILITOT 0.4 05/22/2023    AST 27 05/22/2023    ALKPHOS 72 05/22/2023    CO2 31 (H) 05/22/2023    ALT 25 05/22/2023    ANIONGAP 6 (L) 05/22/2023       TSH  Lab Results   Component Value Date    TSH 0.786 05/22/2023       Assessment / Plan:      ICD-10-CM ICD-9-CM   1. Diabetes mellitus with coincident hypertension  E11.9 250.00    I10 401.9   2. Aortic atherosclerosis  I70.0 440.0   3. Type 2 diabetes mellitus without complication, without long-term current use of insulin  E11.9 250.00     Diabetes mellitus with coincident hypertension    Aortic atherosclerosis    Type 2 diabetes mellitus without complication, without long-term current use of insulin  -     Hemoglobin A1C; Future; Expected date: 10/17/2023    Other orders  -     Influenza (FLUAD) - Quadrivalent (Adjuvanted) *Preferred* (65+) (PF)    Looks at feet each day-sees podiatrist.      Discussed how to stay healthy including: diet, and exercise.   [Negative] : Heme/Lymph

## 2023-12-11 ENCOUNTER — RX RENEWAL (OUTPATIENT)
Age: 68
End: 2023-12-11

## 2023-12-18 NOTE — HISTORY OF PRESENT ILLNESS
[de-identified] : MARQUITA MARTINI is a 67 y.o. M with a PMH significant for HTN, psoriasis, psoriatic arthritis, fatty liver, and alcohol abuse, who we are following for a multifactorial anemia. \par \par 10/17/19 - Initially seen in our office for a microcytic anemia (Dr. Benítez). \par Hgb: 11.3,  Hct: 37.2,  MCV: 81.4,  Ferritin: 22,  B12: 225.  \par Diagnosed with B12 deficiency - Was started on B12 injections, then OTC B12.   \par Ferritin not addressed. \par \par 11/19/19 - Hgb: 10.1, Hct: 32.7, MCV: 82.4, Folate: 3.2, Ferritin: 15\par Diagnosed with Folate deficiency - Folic acid ordered.  \par Was found to have at least 2 alcoholic drinks per day - informed of marrow - toxic effects of alcohol and advised to cut back. \par Consider IV iron. \par \par 2/22/21 - Hgb: 12.9, Hct: 38.2, MCV: 89.7 Folate: >20, B12: >2000, Ferritin: 37\par 8/3/21 -   Hgb: 12.9, Hct: 38.3, MCV: 95.5  \par \par 6/2/22 - 6/15/22 - Hospitalized after fell down ~ 15 steps while intoxicated.  Had closed C1 fracture and closed rib fracture.  Hospital course was complicated by EtOH withdrawal.  D/C'ed to [facility] -> 6/28/22. \par Prior to this was scheduled to have hemorrhoid surgery at [facility] on 6/7/22 with Dr. Willie Messer. \par \par 8/1/22 - WBC: 3.43,  ANC: 1.22   Hgb: 10.7, Hct: 33.0, MCV: 94.8,  ESR: 52, creat: 0.67\par \par GI eval:\par Colonoscopy 5/2019 (Jamari) - essentially normal, no evidence of bleeding. \par EGD 5/2019 - MIld gastritis.  Repeated 6/2019 - Obstructing Schatzki ring at GE junction -  dilated. \par \par S/p oral iron. [de-identified] : Feels well. Took oral iron for few months as recommended. No longer on oral iron. Energy is good. Neck brace was removed. More active Overall feels well \par Continues folic acid and B 12  Adult

## 2024-02-04 ENCOUNTER — OUTPATIENT (OUTPATIENT)
Dept: OUTPATIENT SERVICES | Facility: HOSPITAL | Age: 69
LOS: 1 days | Discharge: HOME | End: 2024-02-04

## 2024-02-04 DIAGNOSIS — D64.9 ANEMIA, UNSPECIFIED: ICD-10-CM

## 2024-02-06 ENCOUNTER — APPOINTMENT (OUTPATIENT)
Dept: HEMATOLOGY ONCOLOGY | Facility: CLINIC | Age: 69
End: 2024-02-06

## 2024-02-09 ENCOUNTER — RESULT REVIEW (OUTPATIENT)
Age: 69
End: 2024-02-09

## 2024-02-09 ENCOUNTER — APPOINTMENT (OUTPATIENT)
Dept: HEMATOLOGY ONCOLOGY | Facility: CLINIC | Age: 69
End: 2024-02-09

## 2024-02-09 ENCOUNTER — APPOINTMENT (OUTPATIENT)
Dept: HEMATOLOGY ONCOLOGY | Facility: CLINIC | Age: 69
End: 2024-02-09
Payer: MEDICARE

## 2024-02-09 VITALS
DIASTOLIC BLOOD PRESSURE: 97 MMHG | SYSTOLIC BLOOD PRESSURE: 170 MMHG | BODY MASS INDEX: 30.18 KG/M2 | HEIGHT: 62 IN | TEMPERATURE: 98.7 F | OXYGEN SATURATION: 96 % | WEIGHT: 164 LBS | HEART RATE: 98 BPM

## 2024-02-09 DIAGNOSIS — E53.8 DEFICIENCY OF OTHER SPECIFIED B GROUP VITAMINS: ICD-10-CM

## 2024-02-09 LAB
BASOPHILS # BLD AUTO: 0.04 K/UL — SIGNIFICANT CHANGE UP (ref 0–0.2)
BASOPHILS NFR BLD AUTO: 1 % — SIGNIFICANT CHANGE UP (ref 0–2)
EOSINOPHIL # BLD AUTO: 0.04 K/UL — SIGNIFICANT CHANGE UP (ref 0–0.5)
EOSINOPHIL NFR BLD AUTO: 1 % — SIGNIFICANT CHANGE UP (ref 0–6)
HCT VFR BLD CALC: 34.9 % — LOW (ref 39–50)
HGB BLD-MCNC: 11.8 G/DL — LOW (ref 13–17)
IMM GRANULOCYTES NFR BLD AUTO: 0.3 % — SIGNIFICANT CHANGE UP (ref 0–0.9)
LYMPHOCYTES # BLD AUTO: 1.09 K/UL — SIGNIFICANT CHANGE UP (ref 1–3.3)
LYMPHOCYTES # BLD AUTO: 27.5 % — SIGNIFICANT CHANGE UP (ref 13–44)
MCHC RBC-ENTMCNC: 30.9 PG — SIGNIFICANT CHANGE UP (ref 27–34)
MCHC RBC-ENTMCNC: 33.8 GM/DL — SIGNIFICANT CHANGE UP (ref 32–36)
MCV RBC AUTO: 91.4 FL — SIGNIFICANT CHANGE UP (ref 80–100)
MONOCYTES # BLD AUTO: 0.29 K/UL — SIGNIFICANT CHANGE UP (ref 0–0.9)
MONOCYTES NFR BLD AUTO: 7.3 % — SIGNIFICANT CHANGE UP (ref 2–14)
NEUTROPHILS # BLD AUTO: 2.5 K/UL — SIGNIFICANT CHANGE UP (ref 1.8–7.4)
NEUTROPHILS NFR BLD AUTO: 62.9 % — SIGNIFICANT CHANGE UP (ref 43–77)
NRBC # BLD: 0 /100 WBCS — SIGNIFICANT CHANGE UP (ref 0–0)
PLATELET # BLD AUTO: 140 K/UL — LOW (ref 150–400)
RBC # BLD: 3.82 M/UL — LOW (ref 4.2–5.8)
RBC # FLD: 13.7 % — SIGNIFICANT CHANGE UP (ref 10.3–14.5)
WBC # BLD: 3.97 K/UL — SIGNIFICANT CHANGE UP (ref 3.8–10.5)
WBC # FLD AUTO: 3.97 K/UL — SIGNIFICANT CHANGE UP (ref 3.8–10.5)

## 2024-02-09 PROCEDURE — 99214 OFFICE O/P EST MOD 30 MIN: CPT

## 2024-02-10 LAB
ALBUMIN SERPL ELPH-MCNC: 4.4 G/DL — SIGNIFICANT CHANGE UP (ref 3.3–5)
ALP SERPL-CCNC: 97 U/L — SIGNIFICANT CHANGE UP (ref 40–120)
ALT FLD-CCNC: 26 U/L — SIGNIFICANT CHANGE UP (ref 10–45)
ANION GAP SERPL CALC-SCNC: 15 MMOL/L — SIGNIFICANT CHANGE UP (ref 5–17)
AST SERPL-CCNC: 71 U/L — HIGH (ref 10–40)
BILIRUB SERPL-MCNC: 0.6 MG/DL — SIGNIFICANT CHANGE UP (ref 0.2–1.2)
BUN SERPL-MCNC: 7 MG/DL — SIGNIFICANT CHANGE UP (ref 7–23)
CALCIUM SERPL-MCNC: 9.1 MG/DL — SIGNIFICANT CHANGE UP (ref 8.4–10.5)
CHLORIDE SERPL-SCNC: 105 MMOL/L — SIGNIFICANT CHANGE UP (ref 96–108)
CO2 SERPL-SCNC: 24 MMOL/L — SIGNIFICANT CHANGE UP (ref 22–31)
CREAT SERPL-MCNC: 0.63 MG/DL — SIGNIFICANT CHANGE UP (ref 0.5–1.3)
EGFR: 104 ML/MIN/1.73M2 — SIGNIFICANT CHANGE UP
FERRITIN SERPL-MCNC: 75 NG/ML — SIGNIFICANT CHANGE UP (ref 30–400)
FOLATE SERPL-MCNC: >20 NG/ML — SIGNIFICANT CHANGE UP
GLUCOSE SERPL-MCNC: 105 MG/DL — HIGH (ref 70–99)
IRON SATN MFR SERPL: 121 UG/DL — SIGNIFICANT CHANGE UP (ref 45–165)
IRON SATN MFR SERPL: 36 % — SIGNIFICANT CHANGE UP (ref 16–55)
POTASSIUM SERPL-MCNC: 4 MMOL/L — SIGNIFICANT CHANGE UP (ref 3.5–5.3)
POTASSIUM SERPL-SCNC: 4 MMOL/L — SIGNIFICANT CHANGE UP (ref 3.5–5.3)
PROT SERPL-MCNC: 7.6 G/DL — SIGNIFICANT CHANGE UP (ref 6–8.3)
PROT SERPL-MCNC: 7.6 G/DL — SIGNIFICANT CHANGE UP (ref 6–8.3)
RBC # BLD: 3.92 M/UL — LOW (ref 4.2–5.8)
RETICS #: 48.6 K/UL — SIGNIFICANT CHANGE UP (ref 25–125)
RETICS/RBC NFR: 1.2 % — SIGNIFICANT CHANGE UP (ref 0.5–2.5)
SODIUM SERPL-SCNC: 143 MMOL/L — SIGNIFICANT CHANGE UP (ref 135–145)
TIBC SERPL-MCNC: 338 UG/DL — SIGNIFICANT CHANGE UP (ref 220–430)
UIBC SERPL-MCNC: 218 UG/DL — SIGNIFICANT CHANGE UP (ref 110–370)
VIT B12 SERPL-MCNC: 1286 PG/ML — HIGH (ref 232–1245)

## 2024-02-12 LAB
IGA FLD-MCNC: 321 MG/DL — SIGNIFICANT CHANGE UP (ref 84–499)
IGG FLD-MCNC: 1269 MG/DL — SIGNIFICANT CHANGE UP (ref 610–1660)
IGM SERPL-MCNC: 279 MG/DL — HIGH (ref 35–242)
KAPPA LC SER QL IFE: 4.05 MG/DL — HIGH (ref 0.33–1.94)
KAPPA/LAMBDA FREE LIGHT CHAIN RATIO, SERUM: 1.63 RATIO — SIGNIFICANT CHANGE UP (ref 0.26–1.65)
LAMBDA LC SER QL IFE: 2.48 MG/DL — SIGNIFICANT CHANGE UP (ref 0.57–2.63)

## 2024-02-13 DIAGNOSIS — E53.8 DEFICIENCY OF OTHER SPECIFIED B GROUP VITAMINS: ICD-10-CM

## 2024-02-13 NOTE — ASSESSMENT
[FreeTextEntry1] : This is a 68 year old male with history of multifactorial anemia -  B12 deficiency, folate deficiency, iron deficiency, and marrow toxicity from daily EtOH use. anemia of chronic disease due to his rheumatologic issues.   Iron/B12 and folate levels have normalized since oral supplementation.   Colonoscopy 5/2019 (Dr. Kauffman) - essentially normal, no evidence of bleeding.  EGD 5/2019 - MIld gastritis.  Repeated 6/2019 - Obstructing Schatzki ring at GE junction -  dilated.   He continues to be mildly anemic- Hg is 11.8 today.  Repeat his iron/B12/folate levels.  Myeloma labs were negative in 2019.  Recommend follow up with GI.   Strongly encouraged him to eat better, not skip meals, abstain from alcohol.    He will follow up in 6 months.

## 2024-02-13 NOTE — PHYSICAL EXAM
[de-identified] : looks well  [Normal] : normal appearance, no rash, nodules, vesicles, ulcers, erythema [de-identified] : no rashes or bruises.

## 2024-02-13 NOTE — HISTORY OF PRESENT ILLNESS
[de-identified] : This is a 68 year old male with history significant for HTN, psoriasis, psoriatic arthritis, fatty liver, and alcohol abuse, who we are following for a multifactorial anemia.   10/17/19 - Initially seen in our office for a microcytic anemia (Dr. Benítez).  Hgb: 11.3,  Hct: 37.2,  MCV: 81.4,  Ferritin: 22,  B12: 225.   Diagnosed with B12 deficiency - Was started on B12 injections, then OTC B12.    Ferritin not addressed.   11/19/19 - Hgb: 10.1, Hct: 32.7, MCV: 82.4, Folate: 3.2, Ferritin: 15 Diagnosed with Folate/iron deficiency - Folic acid ordered.   Was found to have at least 2 alcoholic drinks per day - informed of marrow - toxic effects of alcohol and advised to cut back.  Consider IV iron.   Colonoscopy 5/2019 (Jamari) - essentially normal, no evidence of bleeding.  EGD 5/2019 - MIld gastritis.  Repeated 6/2019 - Obstructing Schatzki ring at GE junction -  dilated.   2/22/21 - Hgb: 12.9, Hct: 38.2, MCV: 89.7 Folate: >20, B12: >2000, Ferritin: 37 8/3/21 -   Hgb: 12.9, Hct: 38.3, MCV: 95.5    6/2/22 - 6/15/22 - Hospitalized after fell down ~ 15 steps while intoxicated.  Had closed C1 fracture and closed rib fracture.  Hospital course was complicated by EtOH withdrawal.  D/C'ed to [facility] -> 6/28/22.  Prior to this was scheduled to have hemorrhoid surgery at [facility] on 6/7/22 with Dr. Willie Messer.    [de-identified] : He overall feels well, he took oral iron for few months, is no longer on oral iron.  He remains on folic acid and B12.   He is still drinking, states only 2 drinks a day.  He is skipping meals, only eating 1 meal a day.  Denies any weight loss.   Plans to move to [place] next year.

## 2024-02-14 LAB
% ALBUMIN: 53.6 % — SIGNIFICANT CHANGE UP
% ALPHA 1: 4.3 % — SIGNIFICANT CHANGE UP
% ALPHA 2: 11.9 % — SIGNIFICANT CHANGE UP
% BETA: 11.6 % — SIGNIFICANT CHANGE UP
% GAMMA: 18.6 % — SIGNIFICANT CHANGE UP
ALBUMIN SERPL ELPH-MCNC: 4.1 G/DL — SIGNIFICANT CHANGE UP (ref 3.6–5.5)
ALBUMIN/GLOB SERPL ELPH: 1.2 RATIO — SIGNIFICANT CHANGE UP
ALPHA1 GLOB SERPL ELPH-MCNC: 0.3 G/DL — SIGNIFICANT CHANGE UP (ref 0.1–0.4)
ALPHA2 GLOB SERPL ELPH-MCNC: 0.9 G/DL — SIGNIFICANT CHANGE UP (ref 0.5–1)
B-GLOBULIN SERPL ELPH-MCNC: 0.9 G/DL — SIGNIFICANT CHANGE UP (ref 0.5–1)
GAMMA GLOBULIN: 1.4 G/DL — SIGNIFICANT CHANGE UP (ref 0.6–1.6)
INTERPRETATION SERPL IFE-IMP: SIGNIFICANT CHANGE UP
PROT PATTERN SERPL ELPH-IMP: SIGNIFICANT CHANGE UP
PROT SERPL-MCNC: 7.6 G/DL — SIGNIFICANT CHANGE UP (ref 6–8.3)

## 2024-03-08 ENCOUNTER — RX RENEWAL (OUTPATIENT)
Age: 69
End: 2024-03-08

## 2024-03-09 RX ORDER — ATORVASTATIN CALCIUM 10 MG/1
10 TABLET, FILM COATED ORAL
Qty: 90 | Refills: 2 | Status: ACTIVE | COMMUNITY
Start: 2022-12-15 | End: 1900-01-01

## 2024-04-12 ENCOUNTER — RX RENEWAL (OUTPATIENT)
Age: 69
End: 2024-04-12

## 2024-04-15 ENCOUNTER — APPOINTMENT (OUTPATIENT)
Dept: RHEUMATOLOGY | Facility: CLINIC | Age: 69
End: 2024-04-15
Payer: MEDICARE

## 2024-04-15 VITALS
BODY MASS INDEX: 29.81 KG/M2 | TEMPERATURE: 97.6 F | WEIGHT: 162 LBS | HEIGHT: 62 IN | DIASTOLIC BLOOD PRESSURE: 78 MMHG | SYSTOLIC BLOOD PRESSURE: 124 MMHG | OXYGEN SATURATION: 95 % | HEART RATE: 75 BPM

## 2024-04-15 DIAGNOSIS — Z79.899 OTHER LONG TERM (CURRENT) DRUG THERAPY: ICD-10-CM

## 2024-04-15 DIAGNOSIS — Z51.81 ENCOUNTER FOR THERAPEUTIC DRUG LVL MONITORING: ICD-10-CM

## 2024-04-15 DIAGNOSIS — E54 ASCORBIC ACID DEFICIENCY: ICD-10-CM

## 2024-04-15 DIAGNOSIS — L40.50 ARTHROPATHIC PSORIASIS, UNSPECIFIED: ICD-10-CM

## 2024-04-15 PROCEDURE — 99214 OFFICE O/P EST MOD 30 MIN: CPT

## 2024-04-15 PROCEDURE — G2211 COMPLEX E/M VISIT ADD ON: CPT

## 2024-04-15 PROCEDURE — 36415 COLL VENOUS BLD VENIPUNCTURE: CPT

## 2024-04-15 RX ORDER — MULTIVIT-MIN/IRON/FOLIC ACID/K 18-600-40
CAPSULE ORAL
Refills: 0 | Status: ACTIVE | COMMUNITY

## 2024-04-17 NOTE — ASSESSMENT
[FreeTextEntry1] : Mr. Jenkins is a former smoking male with a PMHX HTN, Ps/PsA  multiple issues to check  #PSA dx 2016 now controlled on combination of SSZ and otezla. chronic deformities without acute changes -- check inflammatory markers -- continue otezla   #cervical fracture  -- f/u neuro sx  # vitamin deficiencies  -- check vitamin c -- check vitamin d  #medicine monitoring. long term use of drug -- check labs -- Quant tb negative August 2022 - recheck  #inc LFT.  likely 2/2 dx with fatty liver, he does consume ETOH on a daily basis.  -- monitor closely given meds  #Anemia- chronic, possibly mixed picture with normal mcv - now being treated for b12 def (replete)  - will f/u with heme      More than 50% of the encounter was spent counseling the patient on differential, workup, disease course and treatment/management.  Education was provided to the patient during this encounter.  All questions and concerns were addressed and answered.   The patient verbalized understanding and agreed to the plan.   Patient has been instructed to call for an appointment if new symptoms develop. Patient has been instructed to make a follow-up appointment in 3 months.  Time spent on the encounter included, but is not limited to, preparing to see the patient, obtaining and/or reviewing separately obtained history, performing the evaluation, counseling and educating, independently interpreting results with communication to patient, order placement, referring and/or communicating with other health professionals as described, and documenting clinical information in the electronic health record.

## 2024-04-17 NOTE — REVIEW OF SYSTEMS
[Abdominal Pain] : abdominal pain [Diarrhea] : diarrhea [Arthralgias] : arthralgias [Negative] : Heme/Lymph [Fever] : no fever [Chills] : no chills [Feeling Poorly] : not feeling poorly [Feeling Tired] : not feeling tired [Recent Weight Gain (___ Lbs)] : no recent weight gain [Recent Weight Loss (___ Lbs)] : no recent weight loss [Eye Pain] : no eye pain [Red Eyes] : eyes not red [Eyesight Problems] : no eyesight problems [Discharge From Eyes] : no purulent discharge from the eyes [Dry Eyes] : no dryness of the eyes [Eyes Itch] : no itching of the eyes [Earache] : no earache [Loss Of Hearing] : no hearing loss [Heart Rate Is Slow] : the heart rate was not slow [Heart Rate Is Fast] : the heart rate was not fast [Chest Pain] : no chest pain [Palpitations] : no palpitations [Leg Claudication] : no intermittent leg claudication [Lower Ext Edema] : no extremity edema [Shortness Of Breath] : no shortness of breath [Wheezing] : no wheezing [Cough] : no cough [SOB on Exertion] : no shortness of breath during exertion [Orthopnea] : no orthopnea [PND] : no PND [Vomiting] : no vomiting [Heartburn] : no heartburn [Constipation] : no constipation [Melena] : no melena [Joint Pain] : no joint pain [Joint Swelling] : no joint swelling [Joint Stiffness] : no joint stiffness [Itching] : no itching [de-identified] : much improved. Psoriasis

## 2024-04-17 NOTE — ADDENDUM
[FreeTextEntry1] : This note was written by Shamika Dowling, acting as the  for Dr. Merlos. This note accurately reflects the work and decisions made by Dr. Merlos.

## 2024-04-17 NOTE — PHYSICAL EXAM
[General Appearance - Alert] : alert [General Appearance - In No Acute Distress] : in no acute distress [] : no respiratory distress [Auscultation Breath Sounds / Voice Sounds] : lungs were clear to auscultation bilaterally [Musculoskeletal - Swelling] : no joint swelling seen [Oriented To Time, Place, And Person] : oriented to person, place, and time [Impaired Insight] : insight and judgment were intact [Affect] : the affect was normal [FreeTextEntry1] : + psoriasis elbows. No nail pitting

## 2024-04-17 NOTE — HISTORY OF PRESENT ILLNESS
[___ Month(s) Ago] : [unfilled] month(s) ago [FreeTextEntry1] : Mr. Jenkins is a former smoking male with a PMHX HTN, Ps/PsA  Ps/PsA.  dx 2016.  presented with skin, diffuse joint pain and swelling. 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 He states he takes it daily but has not been able to refill his prescription since his dentist recently passed away. - the patient states that when he permanently moves to [place], [place], he and his wife will come to [place] to have all of his medical visits done all at once over the course of a week so he can continue care with his established physicians, but he will also establish care in [place] in case of emergencies.

## 2024-04-26 LAB
ALBUMIN SERPL ELPH-MCNC: 4.5 G/DL
ALP BLD-CCNC: 110 U/L
ALT SERPL-CCNC: 26 U/L
ANION GAP SERPL CALC-SCNC: 19 MMOL/L
AST SERPL-CCNC: 60 U/L
BILIRUB SERPL-MCNC: 0.5 MG/DL
BUN SERPL-MCNC: 6 MG/DL
CALCIUM SERPL-MCNC: 9.5 MG/DL
CHLORIDE SERPL-SCNC: 104 MMOL/L
CO2 SERPL-SCNC: 20 MMOL/L
CREAT SERPL-MCNC: 0.67 MG/DL
CRP SERPL-MCNC: 4 MG/L
EGFR: 102 ML/MIN/1.73M2
ERYTHROCYTE [SEDIMENTATION RATE] IN BLOOD BY WESTERGREN METHOD: 34 MM/HR
HAV IGM SER QL: NONREACTIVE
HBV CORE IGG+IGM SER QL: NONREACTIVE
HBV CORE IGM SER QL: NONREACTIVE
HBV SURFACE AG SER QL: NONREACTIVE
HCT VFR BLD CALC: 34.9 %
HCV AB SER QL: NONREACTIVE
HCV S/CO RATIO: 0.15 S/CO
HGB BLD-MCNC: 11.6 G/DL
M TB IFN-G BLD-IMP: NEGATIVE
MCHC RBC-ENTMCNC: 31.4 PG
MCHC RBC-ENTMCNC: 33.2 GM/DL
MCV RBC AUTO: 94.6 FL
PLATELET # BLD AUTO: 143 K/UL
POTASSIUM SERPL-SCNC: 4 MMOL/L
PROT SERPL-MCNC: 7.5 G/DL
QUANTIFERON TB PLUS MITOGEN MINUS NIL: 7.31 IU/ML
QUANTIFERON TB PLUS NIL: 0.08 IU/ML
QUANTIFERON TB PLUS TB1 MINUS NIL: 0 IU/ML
QUANTIFERON TB PLUS TB2 MINUS NIL: 0 IU/ML
RBC # BLD: 3.69 M/UL
RBC # FLD: 13.8 %
SODIUM SERPL-SCNC: 143 MMOL/L
VIT C SERPL-MCNC: 0.3 MG/DL
WBC # FLD AUTO: 3.08 K/UL

## 2024-05-08 ENCOUNTER — OUTPATIENT (OUTPATIENT)
Dept: OUTPATIENT SERVICES | Facility: HOSPITAL | Age: 69
LOS: 1 days | Discharge: HOME | End: 2024-05-08

## 2024-05-08 DIAGNOSIS — E53.8 DEFICIENCY OF OTHER SPECIFIED B GROUP VITAMINS: ICD-10-CM

## 2024-05-08 DIAGNOSIS — D64.9 ANEMIA, UNSPECIFIED: ICD-10-CM

## 2024-05-09 ENCOUNTER — RESULT REVIEW (OUTPATIENT)
Age: 69
End: 2024-05-09

## 2024-05-09 ENCOUNTER — APPOINTMENT (OUTPATIENT)
Dept: HEMATOLOGY ONCOLOGY | Facility: CLINIC | Age: 69
End: 2024-05-09
Payer: MEDICARE

## 2024-05-09 VITALS
WEIGHT: 162 LBS | BODY MASS INDEX: 29.81 KG/M2 | DIASTOLIC BLOOD PRESSURE: 80 MMHG | HEART RATE: 84 BPM | HEIGHT: 62 IN | TEMPERATURE: 98.4 F | OXYGEN SATURATION: 97 % | SYSTOLIC BLOOD PRESSURE: 131 MMHG

## 2024-05-09 LAB
BASOPHILS # BLD AUTO: 0.03 K/UL — SIGNIFICANT CHANGE UP (ref 0–0.2)
BASOPHILS NFR BLD AUTO: 0.6 % — SIGNIFICANT CHANGE UP (ref 0–2)
EOSINOPHIL # BLD AUTO: 0.04 K/UL — SIGNIFICANT CHANGE UP (ref 0–0.5)
EOSINOPHIL NFR BLD AUTO: 0.8 % — SIGNIFICANT CHANGE UP (ref 0–6)
HCT VFR BLD CALC: 36.3 % — LOW (ref 39–50)
HGB BLD-MCNC: 12.3 G/DL — LOW (ref 13–17)
IMM GRANULOCYTES NFR BLD AUTO: 0.2 % — SIGNIFICANT CHANGE UP (ref 0–0.9)
LYMPHOCYTES # BLD AUTO: 1.53 K/UL — SIGNIFICANT CHANGE UP (ref 1–3.3)
LYMPHOCYTES # BLD AUTO: 30.4 % — SIGNIFICANT CHANGE UP (ref 13–44)
MCHC RBC-ENTMCNC: 31.1 PG — SIGNIFICANT CHANGE UP (ref 27–34)
MCHC RBC-ENTMCNC: 33.9 GM/DL — SIGNIFICANT CHANGE UP (ref 32–36)
MCV RBC AUTO: 91.7 FL — SIGNIFICANT CHANGE UP (ref 80–100)
MONOCYTES # BLD AUTO: 0.39 K/UL — SIGNIFICANT CHANGE UP (ref 0–0.9)
MONOCYTES NFR BLD AUTO: 7.8 % — SIGNIFICANT CHANGE UP (ref 2–14)
NEUTROPHILS # BLD AUTO: 3.03 K/UL — SIGNIFICANT CHANGE UP (ref 1.8–7.4)
NEUTROPHILS NFR BLD AUTO: 60.2 % — SIGNIFICANT CHANGE UP (ref 43–77)
NRBC # BLD: 0 /100 WBCS — SIGNIFICANT CHANGE UP (ref 0–0)
PLATELET # BLD AUTO: 155 K/UL — SIGNIFICANT CHANGE UP (ref 150–400)
RBC # BLD: 3.96 M/UL — LOW (ref 4.2–5.8)
RBC # FLD: 12.8 % — SIGNIFICANT CHANGE UP (ref 10.3–14.5)
WBC # BLD: 5.03 K/UL — SIGNIFICANT CHANGE UP (ref 3.8–10.5)
WBC # FLD AUTO: 5.03 K/UL — SIGNIFICANT CHANGE UP (ref 3.8–10.5)

## 2024-05-09 PROCEDURE — 99213 OFFICE O/P EST LOW 20 MIN: CPT

## 2024-05-09 NOTE — HISTORY OF PRESENT ILLNESS
[de-identified] : This is a 68 year old male with history significant for HTN, psoriasis, psoriatic arthritis, fatty liver, and alcohol abuse, who we are following for a multifactorial anemia.   10/17/19 - Initially seen in our office for a microcytic anemia (Dr. Benítez).  Hgb: 11.3,  Hct: 37.2,  MCV: 81.4,  Ferritin: 22,  B12: 225.   Diagnosed with B12 deficiency - Was started on B12 injections, then OTC B12.    Ferritin not addressed.   11/19/19 - Hgb: 10.1, Hct: 32.7, MCV: 82.4, Folate: 3.2, Ferritin: 15 Diagnosed with Folate/iron deficiency - Folic acid ordered.   Was found to have at least 2 alcoholic drinks per day - informed of marrow - toxic effects of alcohol and advised to cut back.  Consider IV iron.   Colonoscopy 5/2019 (Jamari) - essentially normal, no evidence of bleeding.  EGD 5/2019 - MIld gastritis.  Repeated 6/2019 - Obstructing Schatzki ring at GE junction -  dilated.   2/22/21 - Hgb: 12.9, Hct: 38.2, MCV: 89.7 Folate: >20, B12: >2000, Ferritin: 37 8/3/21 -   Hgb: 12.9, Hct: 38.3, MCV: 95.5    6/2/22 - 6/15/22 - Hospitalized after fell down ~ 15 steps while intoxicated.  Had closed C1 fracture and closed rib fracture.  Hospital course was complicated by EtOH withdrawal.  D/C'ed to [facility] -> 6/28/22.  Prior to this was scheduled to have hemorrhoid surgery at [facility] on 6/7/22 with Dr. Willie Messer.    [de-identified] : He has no complaints.  He is eating better, now having at least 2 meals a day.   Denies any melena/BRBPR, he has not followed up with GI.  Denies any weight loss.   Plans to move to [place] next year.

## 2024-05-09 NOTE — ASSESSMENT
[FreeTextEntry1] : This is a 68 year old male with history of multifactorial anemia -  B12 deficiency, folate deficiency, iron deficiency, and marrow toxicity from daily EtOH use. anemia of chronic disease due to his rheumatologic issues.   Iron/B12 and folate levels have normalized since oral supplementation.   Colonoscopy 5/2019 (Dr. Kauffman) - essentially normal, no evidence of bleeding.  EGD 5/2019 - MIld gastritis.  Repeated 6/2019 - Obstructing Schatzki ring at GE junction -  dilated.   He continues to be mildly anemic- Hg is 12.3 today.  Repeat his iron/B12/folate levels.  Myeloma labs were negative.   Again recommend follow up with GI.   Strongly encouraged him to continue to eat better, not skip meals, abstain from alcohol.    He will follow up in 6 months.

## 2024-05-10 LAB
FERRITIN SERPL-MCNC: 62 NG/ML — SIGNIFICANT CHANGE UP (ref 30–400)
IRON SATN MFR SERPL: 118 UG/DL — SIGNIFICANT CHANGE UP (ref 45–165)
IRON SATN MFR SERPL: 34 % — SIGNIFICANT CHANGE UP (ref 16–55)
TIBC SERPL-MCNC: 351 UG/DL — SIGNIFICANT CHANGE UP (ref 220–430)
UIBC SERPL-MCNC: 233 UG/DL — SIGNIFICANT CHANGE UP (ref 110–370)

## 2024-05-23 ENCOUNTER — APPOINTMENT (OUTPATIENT)
Age: 69
End: 2024-05-23
Payer: MEDICARE

## 2024-05-23 PROCEDURE — 99213 OFFICE O/P EST LOW 20 MIN: CPT

## 2024-06-10 NOTE — SWALLOW BEDSIDE ASSESSMENT ADULT - SLP GENERAL OBSERVATIONS
HPI     Follow-up            Comments: RTC 4-5 months for 24-2VF, IOP check and pach  Pt states VA has been good, no pain           Last edited by Belkis Moses on 6/10/2024  8:19 AM.            Assessment /Plan     For exam results, see Encounter Report.    Open angle with borderline findings of both eyes  -     English Visual Field - OU - Extended - Both Eyes      IOP borderline OU  HVF normal OU   No evidence of glaucoma at this time but based on risk factors recommend to continue monitoring.   Monitor 6 months    RTC 6 months for dilated exam and gOCT or PRN  Discussed above and all questions were answered.                       Pt received & seen seated upright in bed, awake/alert, oriented to time, b/l wrist restraints, +cervical collar, reduced speech intelligibility, baseline cough, 0/10 pain

## 2024-06-27 PROBLEM — F10.10 ALCOHOL ABUSE: Status: ACTIVE | Noted: 2022-12-14

## 2024-06-27 PROBLEM — L40.50 PSORIATIC ARTHRITIS: Status: ACTIVE | Noted: 2019-10-17

## 2024-06-27 PROBLEM — E78.5 HYPERLIPIDEMIA: Status: ACTIVE | Noted: 2022-12-15

## 2024-06-27 PROBLEM — R36.1 HEMATOSPERMIA: Status: ACTIVE | Noted: 2024-06-22

## 2024-06-27 PROBLEM — D64.9 ANEMIA: Status: ACTIVE | Noted: 2019-06-04

## 2024-06-28 ENCOUNTER — APPOINTMENT (OUTPATIENT)
Dept: FAMILY MEDICINE | Facility: CLINIC | Age: 69
End: 2024-06-28
Payer: MEDICARE

## 2024-06-28 VITALS
OXYGEN SATURATION: 96 % | TEMPERATURE: 97.3 F | HEART RATE: 81 BPM | SYSTOLIC BLOOD PRESSURE: 118 MMHG | BODY MASS INDEX: 29.08 KG/M2 | WEIGHT: 158 LBS | DIASTOLIC BLOOD PRESSURE: 72 MMHG | HEIGHT: 62 IN

## 2024-06-28 DIAGNOSIS — I10 ESSENTIAL (PRIMARY) HYPERTENSION: ICD-10-CM

## 2024-06-28 DIAGNOSIS — L40.50 ARTHROPATHIC PSORIASIS, UNSPECIFIED: ICD-10-CM

## 2024-06-28 DIAGNOSIS — D64.9 ANEMIA, UNSPECIFIED: ICD-10-CM

## 2024-06-28 DIAGNOSIS — R36.1 HEMATOSPERMIA: ICD-10-CM

## 2024-06-28 DIAGNOSIS — F10.10 ALCOHOL ABUSE, UNCOMPLICATED: ICD-10-CM

## 2024-06-28 DIAGNOSIS — E78.5 HYPERLIPIDEMIA, UNSPECIFIED: ICD-10-CM

## 2024-06-28 PROCEDURE — 99204 OFFICE O/P NEW MOD 45 MIN: CPT

## 2024-06-28 PROCEDURE — 36415 COLL VENOUS BLD VENIPUNCTURE: CPT

## 2024-06-29 DIAGNOSIS — R82.81 PYURIA: ICD-10-CM

## 2024-06-29 LAB
ALBUMIN SERPL ELPH-MCNC: 4.4 G/DL
ALP BLD-CCNC: 93 U/L
ALT SERPL-CCNC: 30 U/L
ANION GAP SERPL CALC-SCNC: 17 MMOL/L
APPEARANCE: ABNORMAL
AST SERPL-CCNC: 64 U/L
BACTERIA: ABNORMAL /HPF
BASOPHILS # BLD AUTO: 0.03 K/UL
BASOPHILS NFR BLD AUTO: 0.6 %
BILIRUB SERPL-MCNC: 0.8 MG/DL
BILIRUBIN URINE: NEGATIVE
BLOOD URINE: ABNORMAL
BUN SERPL-MCNC: 12 MG/DL
CALCIUM SERPL-MCNC: 9.7 MG/DL
CAST: 14 /LPF
CHLORIDE SERPL-SCNC: 103 MMOL/L
CO2 SERPL-SCNC: 19 MMOL/L
COLOR: NORMAL
CREAT SERPL-MCNC: 0.64 MG/DL
EGFR: 102 ML/MIN/1.73M2
EOSINOPHIL # BLD AUTO: 0.04 K/UL
EOSINOPHIL NFR BLD AUTO: 0.8 %
EPITHELIAL CELLS: 2 /HPF
GLUCOSE QUALITATIVE U: NEGATIVE MG/DL
GLUCOSE SERPL-MCNC: 98 MG/DL
HCT VFR BLD CALC: 35.9 %
HGB BLD-MCNC: 12.2 G/DL
HYALINE CASTS: PRESENT
IMM GRANULOCYTES NFR BLD AUTO: 0.2 %
KETONES URINE: ABNORMAL MG/DL
LEUKOCYTE ESTERASE URINE: ABNORMAL
LYMPHOCYTES # BLD AUTO: 1.35 K/UL
LYMPHOCYTES NFR BLD AUTO: 27.7 %
MAN DIFF?: NORMAL
MCHC RBC-ENTMCNC: 30.9 PG
MCHC RBC-ENTMCNC: 34 GM/DL
MCV RBC AUTO: 90.9 FL
MICROSCOPIC-UA: NORMAL
MONOCYTES # BLD AUTO: 0.3 K/UL
MONOCYTES NFR BLD AUTO: 6.1 %
NEUTROPHILS # BLD AUTO: 3.15 K/UL
NEUTROPHILS NFR BLD AUTO: 64.6 %
NITRITE URINE: POSITIVE
PH URINE: 5.5
PLATELET # BLD AUTO: 153 K/UL
POTASSIUM SERPL-SCNC: 4.2 MMOL/L
PROT SERPL-MCNC: 7.7 G/DL
PROTEIN URINE: 100 MG/DL
PSA SERPL-MCNC: 0.91 NG/ML
RBC # BLD: 3.95 M/UL
RBC # FLD: 14 %
RED BLOOD CELLS URINE: 1 /HPF
REVIEW: NORMAL
SODIUM SERPL-SCNC: 139 MMOL/L
SPECIFIC GRAVITY URINE: 1.02
UROBILINOGEN URINE: 1 MG/DL
WBC # FLD AUTO: 4.88 K/UL
WBC CLUMPS: PRESENT
WHITE BLOOD CELLS URINE: 116 /HPF

## 2024-07-05 DIAGNOSIS — A49.9 URINARY TRACT INFECTION, SITE NOT SPECIFIED: ICD-10-CM

## 2024-07-05 DIAGNOSIS — N39.0 URINARY TRACT INFECTION, SITE NOT SPECIFIED: ICD-10-CM

## 2024-07-05 RX ORDER — NITROFURANTOIN (MONOHYDRATE/MACROCRYSTALS) 25; 75 MG/1; MG/1
100 CAPSULE ORAL
Qty: 10 | Refills: 0 | Status: ACTIVE | COMMUNITY
Start: 2024-07-05 | End: 1900-01-01

## 2024-08-01 ENCOUNTER — APPOINTMENT (OUTPATIENT)
Dept: UROLOGY | Facility: CLINIC | Age: 69
End: 2024-08-01
Payer: MEDICARE

## 2024-08-01 DIAGNOSIS — N40.1 BENIGN PROSTATIC HYPERPLASIA WITH LOWER URINARY TRACT SYMPMS: ICD-10-CM

## 2024-08-01 DIAGNOSIS — R82.71 BACTERIURIA: ICD-10-CM

## 2024-08-01 DIAGNOSIS — R39.15 URGENCY OF URINATION: ICD-10-CM

## 2024-08-01 DIAGNOSIS — Z12.5 ENCOUNTER FOR SCREENING FOR MALIGNANT NEOPLASM OF PROSTATE: ICD-10-CM

## 2024-08-01 DIAGNOSIS — R36.1 HEMATOSPERMIA: ICD-10-CM

## 2024-08-01 PROCEDURE — 99203 OFFICE O/P NEW LOW 30 MIN: CPT

## 2024-08-01 NOTE — PHYSICAL EXAM
[Normal Appearance] : normal appearance [Well Groomed] : well groomed [General Appearance - In No Acute Distress] : no acute distress [Edema] : no peripheral edema [Respiration, Rhythm And Depth] : normal respiratory rhythm and effort [Exaggerated Use Of Accessory Muscles For Inspiration] : no accessory muscle use [Abdomen Soft] : soft [Abdomen Tenderness] : non-tender [Costovertebral Angle Tenderness] : no ~M costovertebral angle tenderness [Urethral Meatus] : meatus normal [Urinary Bladder Findings] : the bladder was normal on palpation [Testes Tenderness] : no tenderness of the testes [Testes Mass (___cm)] : there were no testicular masses [Prostate Tenderness] : the prostate was not tender [No Prostate Nodules] : no prostate nodules [Normal Station and Gait] : the gait and station were normal for the patient's age [] : no rash [No Focal Deficits] : no focal deficits [Oriented To Time, Place, And Person] : oriented to person, place, and time [Affect] : the affect was normal [Mood] : the mood was normal [No Palpable Adenopathy] : no palpable adenopathy

## 2024-08-02 LAB
APPEARANCE: CLEAR
BACTERIA: ABNORMAL /HPF
BILIRUBIN URINE: NEGATIVE
BLOOD URINE: NEGATIVE
CAST: 0 /LPF
COLOR: YELLOW
EPITHELIAL CELLS: 2 /HPF
GLUCOSE QUALITATIVE U: NEGATIVE MG/DL
KETONES URINE: NEGATIVE MG/DL
LEUKOCYTE ESTERASE URINE: ABNORMAL
MICROSCOPIC-UA: NORMAL
NITRITE URINE: POSITIVE
PH URINE: 6
PROTEIN URINE: 30 MG/DL
RED BLOOD CELLS URINE: 0 /HPF
SPECIFIC GRAVITY URINE: 1.01
UROBILINOGEN URINE: 0.2 MG/DL
WHITE BLOOD CELLS URINE: 33 /HPF

## 2024-08-02 NOTE — HISTORY OF PRESENT ILLNESS
[FreeTextEntry1] : 69-year-old man seen 08/01/2024 with complaint of Hematospermia. Recent UC positive and he was treated with 5 days of cipro. Patient was asymptomatic. Reports reasonable stream with urgency during the day. Nocturia Q2H. Patient is not bothered by LUTS. Not taking any medications for the bladder or prostate. Denies previous UTI or history of kidney stones.  PVR 68. No hematuria, no dysuria, no hesitancy, no straining, and no incontinence.    No fevers, no chills, no nausea, no vomiting, no flank pain.     No family history contributory to prostate cancer.

## 2024-08-02 NOTE — ASSESSMENT
[FreeTextEntry1] : 69-year-old male with BPH with LUTS. PVR acceptable. Patient not bothered by LUTS. Discussed treatment options. Patient chooses to observe.   For Hematospermia, discussed this is a benign finding. No further workup indicated at this time.   For PSA screening, SIENNA Benign. PSA 0.91.  For Asymptomatic Bacteriuria, no treatment needed if patient remains symptom free. Will send UA and Ucx.   Patient will RTO annually or sooner if needed.

## 2024-08-05 LAB — BACTERIA UR CULT: ABNORMAL

## 2024-08-11 NOTE — PROGRESS NOTE ADULT - ASSESSMENT
67 y/o male s/p fall down stairs while intoxicated sustaining C1 L lateral arch fx and rib fx 6-8 with occult ptx, alcohol withdrawal, CIWA protocol, valium taper completed with CIWA 4-6.  Passes swallow eval, on soft diet now.    - Continue CIWA  - C-collar immobilization at all times  - Continue PIC protocol  - Local wound care  - PT recommending RANJAN  - DVT prophylaxis  - Dispo planning. States he wants a check up of his body. States chest pain for a couple of days.

## 2024-08-26 ENCOUNTER — RX RENEWAL (OUTPATIENT)
Age: 69
End: 2024-08-26

## 2024-09-04 ENCOUNTER — RX RENEWAL (OUTPATIENT)
Age: 69
End: 2024-09-04

## 2024-09-18 ENCOUNTER — OUTPATIENT (OUTPATIENT)
Dept: OUTPATIENT SERVICES | Facility: HOSPITAL | Age: 69
LOS: 1 days | Discharge: HOME | End: 2024-09-18

## 2024-09-18 DIAGNOSIS — D64.9 ANEMIA, UNSPECIFIED: ICD-10-CM

## 2024-09-18 DIAGNOSIS — E53.8 DEFICIENCY OF OTHER SPECIFIED B GROUP VITAMINS: ICD-10-CM

## 2024-10-03 ENCOUNTER — RESULT REVIEW (OUTPATIENT)
Age: 69
End: 2024-10-03

## 2024-10-03 ENCOUNTER — APPOINTMENT (OUTPATIENT)
Dept: HEMATOLOGY ONCOLOGY | Facility: CLINIC | Age: 69
End: 2024-10-03
Payer: MEDICARE

## 2024-10-03 VITALS
HEIGHT: 62 IN | BODY MASS INDEX: 30 KG/M2 | HEART RATE: 81 BPM | DIASTOLIC BLOOD PRESSURE: 78 MMHG | SYSTOLIC BLOOD PRESSURE: 122 MMHG | OXYGEN SATURATION: 95 % | TEMPERATURE: 98.1 F | WEIGHT: 163 LBS

## 2024-10-03 DIAGNOSIS — D72.819 DECREASED WHITE BLOOD CELL COUNT, UNSPECIFIED: ICD-10-CM

## 2024-10-03 DIAGNOSIS — D64.9 ANEMIA, UNSPECIFIED: ICD-10-CM

## 2024-10-03 LAB
BASOPHILS # BLD AUTO: 0.03 K/UL — SIGNIFICANT CHANGE UP (ref 0–0.2)
BASOPHILS NFR BLD AUTO: 1 % — SIGNIFICANT CHANGE UP (ref 0–2)
EOSINOPHIL # BLD AUTO: 0.03 K/UL — SIGNIFICANT CHANGE UP (ref 0–0.5)
EOSINOPHIL NFR BLD AUTO: 1 % — SIGNIFICANT CHANGE UP (ref 0–6)
HCT VFR BLD CALC: 37.1 % — LOW (ref 39–50)
HGB BLD-MCNC: 12.4 G/DL — LOW (ref 13–17)
LYMPHOCYTES # BLD AUTO: 0.96 K/UL — LOW (ref 1–3.3)
LYMPHOCYTES # BLD AUTO: 32 % — SIGNIFICANT CHANGE UP (ref 13–44)
MCHC RBC-ENTMCNC: 31.2 PG — SIGNIFICANT CHANGE UP (ref 27–34)
MCHC RBC-ENTMCNC: 33.4 GM/DL — SIGNIFICANT CHANGE UP (ref 32–36)
MCV RBC AUTO: 93.2 FL — SIGNIFICANT CHANGE UP (ref 80–100)
MONOCYTES # BLD AUTO: 0.33 K/UL — SIGNIFICANT CHANGE UP (ref 0–0.9)
MONOCYTES NFR BLD AUTO: 11 % — SIGNIFICANT CHANGE UP (ref 2–14)
NEUTROPHILS # BLD AUTO: 1.64 K/UL — LOW (ref 1.8–7.4)
NEUTROPHILS NFR BLD AUTO: 54 % — SIGNIFICANT CHANGE UP (ref 43–77)
NEUTS BAND # BLD: 1 % — SIGNIFICANT CHANGE UP (ref 0–8)
NEUTS BAND NFR BLD: 1 % — SIGNIFICANT CHANGE UP (ref 0–8)
NRBC # BLD: 0 /100 WBCS — SIGNIFICANT CHANGE UP (ref 0–0)
NRBC # BLD: SIGNIFICANT CHANGE UP /100 WBCS (ref 0–0)
NRBC BLD-RTO: 0 /100 WBCS — SIGNIFICANT CHANGE UP (ref 0–0)
NRBC BLD-RTO: SIGNIFICANT CHANGE UP /100 WBCS (ref 0–0)
PLAT MORPH BLD: NORMAL — SIGNIFICANT CHANGE UP
PLATELET # BLD AUTO: 124 K/UL — LOW (ref 150–400)
POLYCHROMASIA BLD QL SMEAR: SLIGHT — SIGNIFICANT CHANGE UP
RBC # BLD: 3.98 M/UL — LOW (ref 4.2–5.8)
RBC # FLD: 12.7 % — SIGNIFICANT CHANGE UP (ref 10.3–14.5)
RBC BLD AUTO: SIGNIFICANT CHANGE UP
WBC # BLD: 2.99 K/UL — LOW (ref 3.8–10.5)
WBC # FLD AUTO: 2.99 K/UL — LOW (ref 3.8–10.5)

## 2024-10-03 PROCEDURE — 99213 OFFICE O/P EST LOW 20 MIN: CPT

## 2024-10-03 NOTE — ASSESSMENT
[FreeTextEntry1] : This is a 69 year old male with history of multifactorial anemia -  B12 deficiency, folate deficiency, iron deficiency, and marrow toxicity from daily EtOH use, anemia of chronic disease due to his rheumatologic issues.   Iron/B12 and folate levels have normalized since oral supplementation.   Colonoscopy 5/2019 (Dr. Kauffman) - essentially normal, no evidence of bleeding.  EGD 5/2019 - MIld gastritis.  Repeated 6/2019 - Obstructing Schatzki ring at GE junction -  dilated.   He continues to be mildly anemic- Hg is 12.4 today, stable. No iron/b12/folate deficiency.  Myeloma labs were negative.   Recommend follow up with GI.   Strongly encouraged him to continue to eat better, not skip meals, abstain from alcohol.    He will follow up as needed.

## 2024-10-03 NOTE — HISTORY OF PRESENT ILLNESS
[de-identified] : This is a 68 year old male with history significant for HTN, psoriasis, psoriatic arthritis, fatty liver, and alcohol abuse, who we are following for a multifactorial anemia.   10/17/19 - Initially seen in our office for a microcytic anemia (Dr. Benítez).  Hgb: 11.3,  Hct: 37.2,  MCV: 81.4,  Ferritin: 22,  B12: 225.   Diagnosed with B12 deficiency - Was started on B12 injections, then OTC B12.    Ferritin not addressed.   11/19/19 - Hgb: 10.1, Hct: 32.7, MCV: 82.4, Folate: 3.2, Ferritin: 15 Diagnosed with Folate/iron deficiency - Folic acid ordered.   Was found to have at least 2 alcoholic drinks per day - informed of marrow - toxic effects of alcohol and advised to cut back.  Consider IV iron.   Colonoscopy 5/2019 (Jamari) - essentially normal, no evidence of bleeding.  EGD 5/2019 - MIld gastritis.  Repeated 6/2019 - Obstructing Schatzki ring at GE junction -  dilated.   2/22/21 - Hgb: 12.9, Hct: 38.2, MCV: 89.7 Folate: >20, B12: >2000, Ferritin: 37 8/3/21 -   Hgb: 12.9, Hct: 38.3, MCV: 95.5    6/2/22 - 6/15/22 - Hospitalized after fell down ~ 15 steps while intoxicated.  Had closed C1 fracture and closed rib fracture.  Hospital course was complicated by EtOH withdrawal.  D/C'ed to [facility] -> 6/28/22.  Prior to this was scheduled to have hemorrhoid surgery at [facility] on 6/7/22 with Dr. Willie Messer.    [de-identified] : He has no complaints.  Planning on moving to [place] in the near future.  He denies any fever/chills, no night sweats. He is attempting to eat better, still drinking alcohol regularly.  Denies any melena/BRBPR, he has not followed up with GI.  Denies any weight loss.

## 2024-10-04 DIAGNOSIS — D72.819 DECREASED WHITE BLOOD CELL COUNT, UNSPECIFIED: ICD-10-CM

## 2024-10-28 ENCOUNTER — APPOINTMENT (OUTPATIENT)
Dept: RHEUMATOLOGY | Facility: CLINIC | Age: 69
End: 2024-10-28
Payer: MEDICARE

## 2024-10-28 VITALS
HEIGHT: 62 IN | SYSTOLIC BLOOD PRESSURE: 132 MMHG | DIASTOLIC BLOOD PRESSURE: 70 MMHG | HEART RATE: 70 BPM | TEMPERATURE: 98.7 F | OXYGEN SATURATION: 96 % | BODY MASS INDEX: 30.36 KG/M2 | WEIGHT: 165 LBS

## 2024-10-28 DIAGNOSIS — Z51.81 ENCOUNTER FOR THERAPEUTIC DRUG LVL MONITORING: ICD-10-CM

## 2024-10-28 DIAGNOSIS — L40.50 ARTHROPATHIC PSORIASIS, UNSPECIFIED: ICD-10-CM

## 2024-10-28 DIAGNOSIS — Z79.899 OTHER LONG TERM (CURRENT) DRUG THERAPY: ICD-10-CM

## 2024-10-28 PROCEDURE — 99214 OFFICE O/P EST MOD 30 MIN: CPT

## 2024-10-28 PROCEDURE — G2211 COMPLEX E/M VISIT ADD ON: CPT

## 2024-10-29 LAB
ERYTHROCYTE [SEDIMENTATION RATE] IN BLOOD BY WESTERGREN METHOD: 30 MM/HR
HCT VFR BLD CALC: 38 %
HGB BLD-MCNC: 12.2 G/DL
MCHC RBC-ENTMCNC: 30.9 PG
MCHC RBC-ENTMCNC: 32.1 GM/DL
MCV RBC AUTO: 96.2 FL
PLATELET # BLD AUTO: 138 K/UL
RBC # BLD: 3.95 M/UL
RBC # FLD: 13.5 %
WBC # FLD AUTO: 3.26 K/UL

## 2024-10-30 ENCOUNTER — RX RENEWAL (OUTPATIENT)
Age: 69
End: 2024-10-30

## 2024-10-30 LAB
ALBUMIN SERPL ELPH-MCNC: 4.4 G/DL
ALP BLD-CCNC: 117 U/L
ALT SERPL-CCNC: 24 U/L
ANION GAP SERPL CALC-SCNC: 16 MMOL/L
AST SERPL-CCNC: 68 U/L
BILIRUB SERPL-MCNC: 0.5 MG/DL
BUN SERPL-MCNC: 8 MG/DL
CALCIUM SERPL-MCNC: 9.5 MG/DL
CHLORIDE SERPL-SCNC: 105 MMOL/L
CO2 SERPL-SCNC: 22 MMOL/L
CREAT SERPL-MCNC: 0.71 MG/DL
CRP SERPL-MCNC: <3 MG/L
EGFR: 99 ML/MIN/1.73M2
POTASSIUM SERPL-SCNC: 4.3 MMOL/L
PROT SERPL-MCNC: 7.4 G/DL
SODIUM SERPL-SCNC: 143 MMOL/L

## 2024-11-21 ENCOUNTER — APPOINTMENT (OUTPATIENT)
Age: 69
End: 2024-11-21
Payer: MEDICARE

## 2024-11-21 PROCEDURE — 41112 EXCISION OF TONGUE LESION: CPT

## 2024-12-07 ENCOUNTER — RESULT CHARGE (OUTPATIENT)
Age: 69
End: 2024-12-07

## 2024-12-11 ENCOUNTER — APPOINTMENT (OUTPATIENT)
Dept: FAMILY MEDICINE | Facility: CLINIC | Age: 69
End: 2024-12-11
Payer: MEDICARE

## 2024-12-11 ENCOUNTER — NON-APPOINTMENT (OUTPATIENT)
Age: 69
End: 2024-12-11

## 2024-12-11 VITALS
SYSTOLIC BLOOD PRESSURE: 138 MMHG | OXYGEN SATURATION: 96 % | HEIGHT: 62 IN | BODY MASS INDEX: 29.81 KG/M2 | WEIGHT: 162 LBS | HEART RATE: 112 BPM | TEMPERATURE: 97.1 F | DIASTOLIC BLOOD PRESSURE: 88 MMHG

## 2024-12-11 DIAGNOSIS — E78.5 HYPERLIPIDEMIA, UNSPECIFIED: ICD-10-CM

## 2024-12-11 DIAGNOSIS — I10 ESSENTIAL (PRIMARY) HYPERTENSION: ICD-10-CM

## 2024-12-11 DIAGNOSIS — Z00.00 ENCOUNTER FOR GENERAL ADULT MEDICAL EXAMINATION W/OUT ABNORMAL FINDINGS: ICD-10-CM

## 2024-12-11 DIAGNOSIS — D64.9 ANEMIA, UNSPECIFIED: ICD-10-CM

## 2024-12-11 DIAGNOSIS — K64.4 RESIDUAL HEMORRHOIDAL SKIN TAGS: ICD-10-CM

## 2024-12-11 DIAGNOSIS — F10.10 ALCOHOL ABUSE, UNCOMPLICATED: ICD-10-CM

## 2024-12-11 DIAGNOSIS — L40.50 ARTHROPATHIC PSORIASIS, UNSPECIFIED: ICD-10-CM

## 2024-12-11 DIAGNOSIS — R41.3 OTHER AMNESIA: ICD-10-CM

## 2024-12-11 PROCEDURE — G0438: CPT

## 2024-12-11 PROCEDURE — 36415 COLL VENOUS BLD VENIPUNCTURE: CPT

## 2024-12-11 PROCEDURE — 93000 ELECTROCARDIOGRAM COMPLETE: CPT

## 2024-12-13 LAB
ALBUMIN SERPL ELPH-MCNC: 4.7 G/DL
ALP BLD-CCNC: 103 U/L
ALT SERPL-CCNC: 28 U/L
ANION GAP SERPL CALC-SCNC: 19 MMOL/L
AST SERPL-CCNC: 64 U/L
BILIRUB SERPL-MCNC: 0.9 MG/DL
BUN SERPL-MCNC: 10 MG/DL
CALCIUM SERPL-MCNC: 10 MG/DL
CHLORIDE SERPL-SCNC: 101 MMOL/L
CHOLEST SERPL-MCNC: 175 MG/DL
CO2 SERPL-SCNC: 23 MMOL/L
CREAT SERPL-MCNC: 0.69 MG/DL
EGFR: 100 ML/MIN/1.73M2
GLUCOSE SERPL-MCNC: 88 MG/DL
HDLC SERPL-MCNC: 79 MG/DL
LDLC SERPL CALC-MCNC: 83 MG/DL
MAGNESIUM SERPL-MCNC: 1.4 MG/DL
NONHDLC SERPL-MCNC: 96 MG/DL
POTASSIUM SERPL-SCNC: 4.7 MMOL/L
PROT SERPL-MCNC: 8 G/DL
PSA SERPL-MCNC: 1.12 NG/ML
SODIUM SERPL-SCNC: 142 MMOL/L
TRIGL SERPL-MCNC: 64 MG/DL
TSH SERPL-ACNC: 0.46 UIU/ML
VIT B12 SERPL-MCNC: 1067 PG/ML

## 2025-01-04 NOTE — PROGRESS NOTE ADULT - NS ATTEND OPT1 GEN_ALL_CORE
I attest my time as attending is greater than 50% of the total combined time spent on qualifying patient care activities by the PA/NP and attending.
135

## 2025-01-14 ENCOUNTER — APPOINTMENT (OUTPATIENT)
Age: 70
End: 2025-01-14

## 2025-01-14 PROCEDURE — 99024 POSTOP FOLLOW-UP VISIT: CPT

## 2025-02-06 ENCOUNTER — APPOINTMENT (OUTPATIENT)
Dept: FAMILY MEDICINE | Facility: CLINIC | Age: 70
End: 2025-02-06
Payer: MEDICARE

## 2025-02-06 VITALS
OXYGEN SATURATION: 96 % | DIASTOLIC BLOOD PRESSURE: 66 MMHG | TEMPERATURE: 97.7 F | HEIGHT: 62 IN | HEART RATE: 93 BPM | WEIGHT: 155 LBS | SYSTOLIC BLOOD PRESSURE: 120 MMHG | BODY MASS INDEX: 28.52 KG/M2

## 2025-02-06 DIAGNOSIS — K14.8 OTHER DISEASES OF TONGUE: ICD-10-CM

## 2025-02-06 DIAGNOSIS — Z01.818 ENCOUNTER FOR OTHER PREPROCEDURAL EXAMINATION: ICD-10-CM

## 2025-02-06 PROCEDURE — 99213 OFFICE O/P EST LOW 20 MIN: CPT

## 2025-02-06 RX ORDER — MAGNESIUM 100 MG
100 TABLET ORAL
Refills: 0 | Status: ACTIVE | COMMUNITY

## 2025-02-06 RX ORDER — PNV NO.95/FERROUS FUM/FOLIC AC 28MG-0.8MG
100 TABLET ORAL
Refills: 0 | Status: ACTIVE | COMMUNITY

## 2025-02-12 ENCOUNTER — OUTPATIENT (OUTPATIENT)
Dept: OUTPATIENT SERVICES | Facility: HOSPITAL | Age: 70
LOS: 1 days | End: 2025-02-12

## 2025-02-12 VITALS
HEIGHT: 63 IN | DIASTOLIC BLOOD PRESSURE: 76 MMHG | SYSTOLIC BLOOD PRESSURE: 117 MMHG | HEART RATE: 67 BPM | OXYGEN SATURATION: 99 % | TEMPERATURE: 98 F | RESPIRATION RATE: 17 BRPM | WEIGHT: 154.98 LBS

## 2025-02-12 DIAGNOSIS — K13.21 LEUKOPLAKIA OF ORAL MUCOSA, INCLUDING TONGUE: ICD-10-CM

## 2025-02-12 DIAGNOSIS — R06.83 SNORING: ICD-10-CM

## 2025-02-12 LAB
HCT VFR BLD CALC: 38.6 % — LOW (ref 39–50)
HGB BLD-MCNC: 12.9 G/DL — LOW (ref 13–17)
MCHC RBC-ENTMCNC: 29.6 PG — SIGNIFICANT CHANGE UP (ref 27–34)
MCHC RBC-ENTMCNC: 33.4 G/DL — SIGNIFICANT CHANGE UP (ref 32–36)
MCV RBC AUTO: 88.5 FL — SIGNIFICANT CHANGE UP (ref 80–100)
NRBC # BLD AUTO: 0 K/UL — SIGNIFICANT CHANGE UP (ref 0–0)
NRBC # FLD: 0 K/UL — SIGNIFICANT CHANGE UP (ref 0–0)
NRBC BLD AUTO-RTO: 0 /100 WBCS — SIGNIFICANT CHANGE UP (ref 0–0)
PLATELET # BLD AUTO: 209 K/UL — SIGNIFICANT CHANGE UP (ref 150–400)
RBC # BLD: 4.36 M/UL — SIGNIFICANT CHANGE UP (ref 4.2–5.8)
RBC # FLD: 13.2 % — SIGNIFICANT CHANGE UP (ref 10.3–14.5)
WBC # BLD: 4.89 K/UL — SIGNIFICANT CHANGE UP (ref 3.8–10.5)
WBC # FLD AUTO: 4.89 K/UL — SIGNIFICANT CHANGE UP (ref 3.8–10.5)

## 2025-02-12 NOTE — H&P PST ADULT - LAST ECHOCARDIOGRAM
GORGE 1/13/25 EF 60-65% Mild LVH Grade 1 left ventricular diastolic dysfunction Trace mitral regurgitation, Trace Pulmonic regurgitation, trace tricuspid regurgitation

## 2025-02-12 NOTE — H&P PST ADULT - HISTORY OF PRESENT ILLNESS
68 yo male  went to dentist 1-1/2 year ago note to have a lesion on left buccal  mucosal area pt was treated with oral clobetasol, lesion remained.   11/21/ 24 Pt was seen Oral surgeon(Dr. Ramirez) who biopsy lesion to left buccal mucosa, also noted lesion to left lateral tongue, that lesion was also biopsied.    Pt was dx with leukoplakia oral mucosa including tongue and was referred to Dr. Moscoso.  pt Now present in Presurgical testing for preop evaluation for scheduled procedure biopsy incisional tongue anterior two third possible application skin substitute graft to the face for the first 25 square centimeter

## 2025-02-12 NOTE — H&P PST ADULT - NSICDXPASTMEDICALHX_GEN_ALL_CORE_FT
PAST MEDICAL HISTORY:  Bleeding hemorrhoids     H/O cervical fracture     H/O ETOH abuse     HLD (hyperlipidemia)     Laceration of radial artery     Oral leukoplakia     Psoriatic arthritis     Rib fracture     Schatzki's ring of distal esophagus

## 2025-02-12 NOTE — H&P PST ADULT - MUSCULOSKELETAL COMMENTS
Psoriatic arthritis hx  fall. 2022 fracture [id number] with Hard collar, albuterol treatments albuterol (albuterol treatment was last used during (hospitalization) 3 left ribs, lacerated radial artery (surgically repaired)

## 2025-02-12 NOTE — H&P PST ADULT - RESPIRATORY AND THORAX
Mom leaves voice mail with concern for fever, fussiness and ear pulling.  Return call to mom who states he started 4 days ago with fussiness and he had some swelling on his gums so she attributed it to teething. He has since been pulling on his ear.  He has been having \"low grade fever\", Tmax 101, currently 99.9.  He has mild cough, no increased work of breathing or wheezing.   He is eating well,, sleeping poorly.  Unfortunately we do not have appointments available in the Pediatric clinic today so parent will seek care in Urgent Care.     Reason for Disposition   Crying started with other symptoms (e.g., fever, earache, diarrhea, vomiting, constipation)   Age 3-6 months with fever who also acts sick    Protocols used: Crying - 3 Months and Older-P-OH, Fever - 3 Months or Older-P-OH     details…

## 2025-02-12 NOTE — H&P PST ADULT - ATTENDING COMMENTS
Agree with above plan,   Patient remained clear of all alcohol consumption since > 30 days confirmed by his family.   WLE mucosectomy left tongue, with partial primary closure and possible integra double layer placement for better pain control post op.  Outpatient procedure.

## 2025-02-13 ENCOUNTER — NON-APPOINTMENT (OUTPATIENT)
Age: 70
End: 2025-02-13

## 2025-02-14 ENCOUNTER — APPOINTMENT (OUTPATIENT)
Dept: COLORECTAL SURGERY | Facility: CLINIC | Age: 70
End: 2025-02-14
Payer: MEDICARE

## 2025-02-14 VITALS
BODY MASS INDEX: 28.52 KG/M2 | SYSTOLIC BLOOD PRESSURE: 146 MMHG | HEIGHT: 62 IN | WEIGHT: 155 LBS | RESPIRATION RATE: 14 BRPM | OXYGEN SATURATION: 97 % | DIASTOLIC BLOOD PRESSURE: 87 MMHG | HEART RATE: 72 BPM

## 2025-02-14 DIAGNOSIS — K64.8 OTHER HEMORRHOIDS: ICD-10-CM

## 2025-02-14 DIAGNOSIS — K64.4 RESIDUAL HEMORRHOIDAL SKIN TAGS: ICD-10-CM

## 2025-02-14 DIAGNOSIS — K64.8 RESIDUAL HEMORRHOIDAL SKIN TAGS: ICD-10-CM

## 2025-02-14 PROCEDURE — 99205 OFFICE O/P NEW HI 60 MIN: CPT

## 2025-02-17 PROBLEM — K64.9 UNSPECIFIED HEMORRHOIDS: Chronic | Status: ACTIVE | Noted: 2025-02-12

## 2025-02-17 PROBLEM — S55.119A: Chronic | Status: ACTIVE | Noted: 2025-02-12

## 2025-02-17 PROBLEM — F10.11 ALCOHOL ABUSE, IN REMISSION: Chronic | Status: ACTIVE | Noted: 2025-02-12

## 2025-02-17 PROBLEM — Z87.81 PERSONAL HISTORY OF (HEALED) TRAUMATIC FRACTURE: Chronic | Status: ACTIVE | Noted: 2025-02-12

## 2025-02-17 PROBLEM — S22.39XA FRACTURE OF ONE RIB, UNSPECIFIED SIDE, INITIAL ENCOUNTER FOR CLOSED FRACTURE: Chronic | Status: ACTIVE | Noted: 2025-02-12

## 2025-02-17 PROBLEM — K22.2 ESOPHAGEAL OBSTRUCTION: Chronic | Status: ACTIVE | Noted: 2025-02-12

## 2025-02-17 PROBLEM — K13.21 LEUKOPLAKIA OF ORAL MUCOSA, INCLUDING TONGUE: Chronic | Status: ACTIVE | Noted: 2025-02-12

## 2025-02-17 PROBLEM — L40.50 ARTHROPATHIC PSORIASIS, UNSPECIFIED: Chronic | Status: ACTIVE | Noted: 2025-02-12

## 2025-02-17 NOTE — ASU PATIENT PROFILE, ADULT - FALL HARM RISK - UNIVERSAL INTERVENTIONS
Bed in lowest position, wheels locked, appropriate side rails in place/Call bell, personal items and telephone in reach/Instruct patient to call for assistance before getting out of bed or chair/Non-slip footwear when patient is out of bed/[place] to call system/Physically safe environment - no spills, clutter or unnecessary equipment/Purposeful Proactive Rounding/Room/bathroom lighting operational, light cord in reach

## 2025-02-18 ENCOUNTER — TRANSCRIPTION ENCOUNTER (OUTPATIENT)
Age: 70
End: 2025-02-18

## 2025-02-18 ENCOUNTER — APPOINTMENT (OUTPATIENT)
Age: 70
End: 2025-02-18

## 2025-02-18 ENCOUNTER — RESULT REVIEW (OUTPATIENT)
Age: 70
End: 2025-02-18

## 2025-02-18 ENCOUNTER — OUTPATIENT (OUTPATIENT)
Dept: INPATIENT UNIT | Facility: HOSPITAL | Age: 70
LOS: 1 days | Discharge: HOME | End: 2025-02-18
Payer: MEDICARE

## 2025-02-18 VITALS
TEMPERATURE: 98 F | HEART RATE: 70 BPM | RESPIRATION RATE: 16 BRPM | SYSTOLIC BLOOD PRESSURE: 122 MMHG | OXYGEN SATURATION: 98 % | DIASTOLIC BLOOD PRESSURE: 71 MMHG

## 2025-02-18 VITALS
OXYGEN SATURATION: 100 % | HEIGHT: 63 IN | TEMPERATURE: 98 F | RESPIRATION RATE: 16 BRPM | WEIGHT: 154.98 LBS | HEART RATE: 69 BPM | DIASTOLIC BLOOD PRESSURE: 78 MMHG | SYSTOLIC BLOOD PRESSURE: 115 MMHG

## 2025-02-18 DIAGNOSIS — K13.21 LEUKOPLAKIA OF ORAL MUCOSA, INCLUDING TONGUE: ICD-10-CM

## 2025-02-18 PROCEDURE — 88309 TISSUE EXAM BY PATHOLOGIST: CPT | Mod: 26

## 2025-02-18 PROCEDURE — 41100 BIOPSY OF TONGUE: CPT | Mod: 58

## 2025-02-18 PROCEDURE — 15275 SKIN SUB GRAFT FACE/NK/HF/G: CPT | Mod: 58

## 2025-02-18 DEVICE — SURGICEL 2 X 14": Type: IMPLANTABLE DEVICE | Status: FUNCTIONAL

## 2025-02-18 DEVICE — INTEGRA WOUND MATRIX MESHED BILAYER 2X2": Type: IMPLANTABLE DEVICE | Status: FUNCTIONAL

## 2025-02-18 RX ORDER — HYDROMORPHONE/SOD CHLOR,ISO/PF 2 MG/10 ML
0.5 SYRINGE (ML) INJECTION
Refills: 0 | Status: DISCONTINUED | OUTPATIENT
Start: 2025-02-18 | End: 2025-02-18

## 2025-02-18 RX ORDER — FOLIC ACID 1 MG
1 TABLET ORAL
Refills: 0 | DISCHARGE

## 2025-02-18 RX ORDER — DOXYCYCLINE HYCLATE 100 MG/1
20 CAPSULE ORAL
Refills: 0 | DISCHARGE

## 2025-02-18 RX ORDER — ATORVASTATIN CALCIUM 80 MG/1
1 TABLET, FILM COATED ORAL
Refills: 0 | DISCHARGE

## 2025-02-18 RX ORDER — AMOXICILLIN 500 MG/1
1 CAPSULE ORAL
Qty: 10 | Refills: 0
Start: 2025-02-18 | End: 2025-02-22

## 2025-02-18 RX ORDER — ACETAMINOPHEN 500 MG/5ML
2 LIQUID (ML) ORAL
Qty: 56 | Refills: 0
Start: 2025-02-18 | End: 2025-02-24

## 2025-02-18 RX ORDER — CYANOCOBALAMIN (VITAMIN B-12) 1000MCG/ML
1 VIAL (ML) INJECTION
Refills: 0 | DISCHARGE

## 2025-02-18 RX ORDER — FENTANYL CITRATE-0.9 % NACL/PF 100MCG/2ML
25 SYRINGE (ML) INTRAVENOUS
Refills: 0 | Status: DISCONTINUED | OUTPATIENT
Start: 2025-02-18 | End: 2025-02-18

## 2025-02-18 RX ORDER — SODIUM CHLORIDE 9 G/1000ML
1000 INJECTION, SOLUTION INTRAVENOUS
Refills: 0 | Status: DISCONTINUED | OUTPATIENT
Start: 2025-02-18 | End: 2025-02-18

## 2025-02-18 RX ORDER — IBUPROFEN 200 MG
1 TABLET ORAL
Qty: 21 | Refills: 0
Start: 2025-02-18 | End: 2025-02-24

## 2025-02-18 RX ORDER — OXYCODONE HYDROCHLORIDE 30 MG/1
1 TABLET ORAL
Qty: 10 | Refills: 0
Start: 2025-02-18 | End: 2025-02-22

## 2025-02-18 NOTE — ASU PREOP CHECKLIST - 3.
Skin check done on admit to ASU - Skin check done on admit to ASU - patient has Psoriasis on back and buttocks - no breakdown noted

## 2025-02-18 NOTE — ASU DISCHARGE PLAN (ADULT/PEDIATRIC) - NS MD DC FALL RISK RISK
For information on Fall & Injury Prevention, visit: [email or web address] OR  [email or web address] OR  https://www.cdc.gov/steadi/patient.html

## 2025-02-18 NOTE — ASU DISCHARGE PLAN (ADULT/PEDIATRIC) - CARE PROVIDER_API CALL
Dorian Moscoso  [id number] [place] [address], [address] [place] [address]  Phone: [phone]  Fax: (   )    -  Follow Up Time: 1 week  
negative - no nasal congestion

## 2025-02-18 NOTE — ASU PREOP CHECKLIST - SELECT TESTS ORDERED
[facility]      Patient Name: Kay Tipton  Medical Record Number:  <[id number]>  Primary Care Physician:  SAMPSON Gonzales CNP  Date of Consultation: 3/11/2020    Chief Complaint: Chronic eustachian tube problems        HISTORY OF PRESENT ILLNESS  Hay Smith is a(n) 77 y.o. male who presents for evaluation of chronic eustachian tube problems. The patient has known chronic eustachian tube dysfunction. He had been seeing an outside ENT for years. he has had about 5 sets of ear tubes. He said the last one was for place around 2015. He said that overall his left ear is done quite well. He has some intermittent popping and cracking, but does not feel that it is particularly symptomatic. The right ear however feels plugged a lot at times. He has to Valsalva aggressively which will help temporarily. He is not sure if his hearing is decreased. He did not have any issues as a child. He is never had any more aggressive ear surgeries other than ear tubes. This reportedly was done under anesthesia. He denies any recent ear infections or drainage. He has had a couple of ear infections related to ear tubes with water getting in them.     Patient Active Problem List   Diagnosis    Hypertension    Hyperlipidemia    Dermatomyositis ([address]    Insomnia    Anxiety    Dysfunction of eustachian tube    History of lumbar laminectomy    Abnormal EKG    Coronary artery disease involving native coronary artery of native heart without angina pectoris    H/O angiography    Right bundle branch block left axis -bifascicular block     Past Surgical History:   Procedure Laterality Date    CARDIAC CATHETERIZATION  05/07/2019    COLONOSCOPY  09/13/2012    Dr. Leora Ortiz  04/16/2005    Dr. Rachelle Noriega, adenomatous polyps    COLONOSCOPY  06/25/2009    Dr. Leora Ortiz  12/30/2015    Dr. Rachelle Noriega, recheck 2020    LUMBAR LAMINECTOMY  2008 L1-L2    NASAL SEPTUM SURGERY  1976    NERVE BIOPSY  2011    Spine:  myxopapillary ependymoma     SKIN CANCER EXCISION  12/08/2004    SCC chest wall    TYMPANOSTOMY TUBE PLACEMENT Bilateral 2017    UPPER GASTROINTESTINAL ENDOSCOPY  09/13/2012    Dr. Kim Seay     Family History   Problem Relation Age of Onset    Diabetes Mother     Breast Cancer Mother     Cancer Mother         bladder    Coronary Art Dis Father     Colon Cancer Father     Heart Surgery Brother 54        CABG    Coronary Art Dis Brother     High Cholesterol Brother     No Known Problems Brother      Social History     Socioeconomic History    Marital status:      Spouse name: Not on file    Number of children: 3    Years of education: Not on file    Highest education level: Not on file   Occupational History    Occupation: Surgical asst     Employer: [employer]     Comment: 36   Social Needs    Financial resource strain: Not on file    Food insecurity     Worry: Not on file     Inability: Not on file    Transportation needs     Medical: Not on file     Non-medical: Not on file   Tobacco Use    Smoking status: Former Smoker     Packs/day: 1.00     Years: 22.00     Pack years: 22.00     Types: Cigarettes     Last attempt to quit: 2005     Years since quitting: 15.2    Smokeless tobacco: Never Used   Substance and Sexual Activity    Alcohol use: Yes     Comment: OCCASIONAL    Drug use: Not on file    Sexual activity: Not on file   Lifestyle    Physical activity     Days per week: Not on file     Minutes per session: Not on file    Stress: Not on file   Relationships    Social connections     Talks on phone: Not on file     Gets together: Not on file     Attends Restorationism service: Not on file     Active member of club or organization: Not on file     Attends meetings of clubs or organizations: Not on file     Relationship status: Not on file    Intimate partner violence     Fear of current or ex partner: Not on file CBC Provider, MD   Cyanocobalamin (VITAMIN B-12) 1000 MCG extended release tablet Take 1,000 mcg by mouth daily    Historical Provider, MD       REVIEW OF SYSTEMS  The following systems were reviewed and revealed the following in addition to any already discussed in the HPI:    CONSTITUTIONAL: no weight loss, no fever, no night sweats, no chills  EYES: no vision changes, no blurry vision  EARS: Plugged ears, decreased hearing  NOSE: no epistaxis, no rhinorrhea  RESPIRATORY: no  Difficulty breathing, no shortness of breath  CV: no chest pain, no Peripheral vascular disease  HEME: No coagulation disorder, no Bleeding disorder  NEURO: no TIA or stroke-like symptoms  SKIN: No new rashes in the head and neck, no recent skin cancers  MOUTH: No new ulcers, no recent teeth infections  GASTROINTESTINAL: No diarrhea, stomach pain  PSYCH: No anxiety, no depression      PHYSICAL EXAM  BP (!) 189/106 (Site: Right Upper Arm, Position: Sitting, Cuff Size: Large Adult)   Pulse 82   Temp 98 °F (36.7 °C) (Oral)   Ht 6' 1\" (1.854 m)   Wt 218 lb (98.9 kg)   BMI 28.76 kg/m²     GENERAL: No Acute Distress, Alert and Oriented, no Hoarseness, strong voice  EYES: EOMI, Anti-icteric  HENT:   Head: Normocephalic and atraumatic. Face:  Symmetric, facial nerve intact, no sinus tenderness   ears: See below  Mouth/Oral Cavity:  normal lips, Uvula is midline, no mucosal lesions, no trismus, normal dentition, normal salivary quality/flow  Oropharynx/Larynx:  normal oropharynx, normal tonsils; normal larynx/nasopharynx on mirror exam  Nose:Normal external nasal appearance. Anterior rhinoscopy shows a normal septum. normal turbinates.   Normal mucosa   NECK: Normal range of motion, no thyromegaly, trachea is midline, no lymphadenopathy, no neck masses, no crepitus  CHEST: Normal respiratory effort, no retractions, breathing comfortably  SKIN: No rashes, normal appearing skin, no evidence of skin lesions/tumors  Neuro:  cranial nerve II-XII performed a head and neck physical exam personally or was physically present during the key or critical portions of the service. Medical Decision Making:   The following items were considered in medical decision making:  Independent review of images  Review / order clinical lab tests  Review / order radiology tests  Decision to obtain old records

## 2025-02-18 NOTE — ASU PREOP CHECKLIST - ALLERGY BAND ON
Post-Care Instructions: I reviewed with the patient in detail post-care instructions. Patient is to wear sunprotection, and avoid picking at any of the treated lesions. Pt may apply Vaseline to crusted or scabbing areas. Render Note In Bullet Format When Appropriate: Yes Consent: The patient's consent was obtained including but not limited to risks of crusting, scabbing, blistering, scarring, darker or lighter pigmentary change, recurrence, incomplete removal and infection. Number Of Freeze-Thaw Cycles: 1 freeze-thaw cycle Detail Level: Zone Duration Of Freeze Thaw-Cycle (Seconds): 0 no known allergies

## 2025-02-18 NOTE — BRIEF OPERATIVE NOTE - NSICDXBRIEFPOSTOP_GEN_ALL_CORE_FT
POST-OP DIAGNOSIS:  Dysplasia of tongue 18-Feb-2025 10:21:51  Chaz Zhao   The patient is a 51y Male complaining of

## 2025-02-18 NOTE — ASU DISCHARGE PLAN (ADULT/PEDIATRIC) - PROVIDER TOKENS
FREE:[LAST:[Genery],FIRST:[Dorian],PHONE:[phone]],FAX:[(   )    -],ADDRESS:[[address] [place] [address], [address] [place] [address]],FOLLOWUP:[1 week]] Clothing

## 2025-02-18 NOTE — ASU DISCHARGE PLAN (ADULT/PEDIATRIC) - FINANCIAL ASSISTANCE
[facility] provides services at a reduced cost to those who are determined to be eligible through [facility]’s financial assistance program. Information regarding [facility]’s financial assistance program can be found by going to [email or web address] or by calling [phone].

## 2025-02-18 NOTE — ASU DISCHARGE PLAN (ADULT/PEDIATRIC) - NURSING INSTRUCTIONS
DO NOT take any Tylenol (Acetaminophen) or narcotics containing Tylenol until after  ___315p___ . You received Tylenol during your operation and it can cause damage to your liver if too much is taken within a 24 hour time period.

## 2025-02-18 NOTE — ASU DISCHARGE PLAN (ADULT/PEDIATRIC) - PAIN MANAGEMENT
Latex:  Patient denies allergy to latex.  Medications reviewed with patient.  Tobacco use verified.  Allergies verified.      CHIEF COMPLAINT:   Chief Complaint   Patient presents with   • Back Problem     NP, lower back pain, since April 2018 Fusion 2008         HISTORY OF PRESENT ILLNESS    Occupation:  Retired  Current work status:  does not apply  1.  When did the main problem begin?   Since April 2018  2.  Describe the injury and/or pain or other complaints:  Dull and throbbing.  3.  Symptoms:  pain  4.  Current Pain Level:  0/10  5.  Is there any other pertinent background information?   None  6.  Has the patient had any treatment yet?   Exercise      REFERRING PHYSICIAN:    [facility] - Dre Odom MD             Prescription called to pharmacy

## 2025-02-18 NOTE — ASU DISCHARGE PLAN (ADULT/PEDIATRIC) - ASU DC SPECIAL INSTRUCTIONSFT
PAIN: You may continue to take Acetaminophen (Tylenol) and Ibuprofen over the counter for pain. You can alternate the two medications, giving one every 3 hours. We recommend taking the medications around the clock for the first few days at home after surgery. Then you can start taking them only as needed for pain. Take oxycodone only for pain not controlled with tylenol and ibuprofen alone.  WOUND CARE:  You may brush your teeth, but be careful of the surgical area and stitches nearby. Use prescription chlorhexidine mouth rinse twice a day.  ACTIVITY: No heavy lifting, straining, or vigorous activity until your follow-up appointment in 1 weeks.   NOTIFY US IF: There is any bleeding that does not stop, any pus draining from wound(s), any fever (over 100.5 F) or chills, persistent nausea/vomiting, persistent diarrhea, or if pain is not controlled on their discharge pain medications.  FOLLOW-UP: Please call the office and make an appointment to follow up with Dr Moscoso in 1 weeks.

## 2025-02-24 LAB — SURGICAL PATHOLOGY STUDY: SIGNIFICANT CHANGE UP

## 2025-02-25 ENCOUNTER — APPOINTMENT (OUTPATIENT)
Age: 70
End: 2025-02-25
Payer: MEDICARE

## 2025-02-25 PROCEDURE — 99024 POSTOP FOLLOW-UP VISIT: CPT

## 2025-03-06 ENCOUNTER — TRANSCRIPTION ENCOUNTER (OUTPATIENT)
Age: 70
End: 2025-03-06

## 2025-03-18 ENCOUNTER — APPOINTMENT (OUTPATIENT)
Dept: FAMILY MEDICINE | Facility: CLINIC | Age: 70
End: 2025-03-18
Payer: MEDICARE

## 2025-03-18 VITALS
HEART RATE: 85 BPM | HEIGHT: 62 IN | WEIGHT: 146 LBS | DIASTOLIC BLOOD PRESSURE: 62 MMHG | OXYGEN SATURATION: 97 % | BODY MASS INDEX: 26.87 KG/M2 | TEMPERATURE: 97.3 F | SYSTOLIC BLOOD PRESSURE: 110 MMHG

## 2025-03-18 DIAGNOSIS — K64.4 RESIDUAL HEMORRHOIDAL SKIN TAGS: ICD-10-CM

## 2025-03-18 DIAGNOSIS — Z01.818 ENCOUNTER FOR OTHER PREPROCEDURAL EXAMINATION: ICD-10-CM

## 2025-03-18 PROCEDURE — 99213 OFFICE O/P EST LOW 20 MIN: CPT

## 2025-03-25 RX ORDER — OXYCODONE AND ACETAMINOPHEN 5; 325 MG/1; MG/1
5-325 TABLET ORAL
Qty: 10 | Refills: 0 | Status: ACTIVE | COMMUNITY
Start: 2025-03-25 | End: 1900-01-01

## 2025-03-26 ENCOUNTER — APPOINTMENT (OUTPATIENT)
Dept: FAMILY MEDICINE | Facility: CLINIC | Age: 70
End: 2025-03-26
Payer: MEDICARE

## 2025-03-26 VITALS
DIASTOLIC BLOOD PRESSURE: 72 MMHG | TEMPERATURE: 98.4 F | BODY MASS INDEX: 26.87 KG/M2 | WEIGHT: 146 LBS | HEIGHT: 62 IN | OXYGEN SATURATION: 96 % | SYSTOLIC BLOOD PRESSURE: 110 MMHG | HEART RATE: 50 BPM

## 2025-03-26 DIAGNOSIS — A08.4 VIRAL INTESTINAL INFECTION, UNSPECIFIED: ICD-10-CM

## 2025-03-26 PROCEDURE — 99213 OFFICE O/P EST LOW 20 MIN: CPT

## 2025-03-28 ENCOUNTER — NON-APPOINTMENT (OUTPATIENT)
Age: 70
End: 2025-03-28

## 2025-06-09 ENCOUNTER — NON-APPOINTMENT (OUTPATIENT)
Age: 70
End: 2025-06-09

## 2025-06-11 ENCOUNTER — APPOINTMENT (OUTPATIENT)
Dept: FAMILY MEDICINE | Facility: CLINIC | Age: 70
End: 2025-06-11
Payer: MEDICARE

## 2025-06-11 VITALS
OXYGEN SATURATION: 98 % | TEMPERATURE: 97 F | WEIGHT: 153 LBS | BODY MASS INDEX: 28.16 KG/M2 | DIASTOLIC BLOOD PRESSURE: 60 MMHG | HEART RATE: 65 BPM | SYSTOLIC BLOOD PRESSURE: 102 MMHG | HEIGHT: 62 IN

## 2025-06-11 PROCEDURE — 99213 OFFICE O/P EST LOW 20 MIN: CPT

## 2025-06-13 ENCOUNTER — OUTPATIENT (OUTPATIENT)
Dept: OUTPATIENT SERVICES | Facility: HOSPITAL | Age: 70
LOS: 1 days | End: 2025-06-13
Payer: MEDICARE

## 2025-06-13 VITALS
OXYGEN SATURATION: 98 % | RESPIRATION RATE: 16 BRPM | HEIGHT: 62 IN | HEART RATE: 67 BPM | DIASTOLIC BLOOD PRESSURE: 64 MMHG | WEIGHT: 154.76 LBS | TEMPERATURE: 98 F | SYSTOLIC BLOOD PRESSURE: 103 MMHG

## 2025-06-13 DIAGNOSIS — Z01.818 ENCOUNTER FOR OTHER PREPROCEDURAL EXAMINATION: ICD-10-CM

## 2025-06-13 DIAGNOSIS — Z98.890 OTHER SPECIFIED POSTPROCEDURAL STATES: Chronic | ICD-10-CM

## 2025-06-13 DIAGNOSIS — K64.9 UNSPECIFIED HEMORRHOIDS: ICD-10-CM

## 2025-06-13 LAB
ANION GAP SERPL CALC-SCNC: 6 MMOL/L — SIGNIFICANT CHANGE UP (ref 5–17)
APTT BLD: 32 SEC — SIGNIFICANT CHANGE UP (ref 26.1–36.8)
BASOPHILS # BLD AUTO: 0.03 K/UL — SIGNIFICANT CHANGE UP (ref 0–0.2)
BASOPHILS NFR BLD AUTO: 0.6 % — SIGNIFICANT CHANGE UP (ref 0–2)
BUN SERPL-MCNC: 19 MG/DL — SIGNIFICANT CHANGE UP (ref 7–23)
CALCIUM SERPL-MCNC: 9.6 MG/DL — SIGNIFICANT CHANGE UP (ref 8.5–10.1)
CHLORIDE SERPL-SCNC: 108 MMOL/L — SIGNIFICANT CHANGE UP (ref 96–108)
CO2 SERPL-SCNC: 25 MMOL/L — SIGNIFICANT CHANGE UP (ref 22–31)
CREAT SERPL-MCNC: 0.74 MG/DL — SIGNIFICANT CHANGE UP (ref 0.5–1.3)
EGFR: 98 ML/MIN/1.73M2 — SIGNIFICANT CHANGE UP
EGFR: 98 ML/MIN/1.73M2 — SIGNIFICANT CHANGE UP
EOSINOPHIL # BLD AUTO: 0.13 K/UL — SIGNIFICANT CHANGE UP (ref 0–0.5)
EOSINOPHIL NFR BLD AUTO: 2.4 % — SIGNIFICANT CHANGE UP (ref 0–6)
GLUCOSE SERPL-MCNC: 98 MG/DL — SIGNIFICANT CHANGE UP (ref 70–99)
HCT VFR BLD CALC: 34.9 % — LOW (ref 39–50)
HGB BLD-MCNC: 11.2 G/DL — LOW (ref 13–17)
IMM GRANULOCYTES # BLD AUTO: 0.01 K/UL — SIGNIFICANT CHANGE UP (ref 0–0.07)
IMM GRANULOCYTES NFR BLD AUTO: 0.2 % — SIGNIFICANT CHANGE UP (ref 0–0.9)
INR BLD: 1.04 RATIO — SIGNIFICANT CHANGE UP (ref 0.85–1.16)
LYMPHOCYTES # BLD AUTO: 1.42 K/UL — SIGNIFICANT CHANGE UP (ref 1–3.3)
LYMPHOCYTES NFR BLD AUTO: 26.7 % — SIGNIFICANT CHANGE UP (ref 13–44)
MCHC RBC-ENTMCNC: 28.9 PG — SIGNIFICANT CHANGE UP (ref 27–34)
MCHC RBC-ENTMCNC: 32.1 G/DL — SIGNIFICANT CHANGE UP (ref 32–36)
MCV RBC AUTO: 90.2 FL — SIGNIFICANT CHANGE UP (ref 80–100)
MONOCYTES # BLD AUTO: 0.48 K/UL — SIGNIFICANT CHANGE UP (ref 0–0.9)
MONOCYTES NFR BLD AUTO: 9 % — SIGNIFICANT CHANGE UP (ref 2–14)
NEUTROPHILS # BLD AUTO: 3.24 K/UL — SIGNIFICANT CHANGE UP (ref 1.8–7.4)
NEUTROPHILS NFR BLD AUTO: 61.1 % — SIGNIFICANT CHANGE UP (ref 43–77)
NRBC # BLD AUTO: 0 K/UL — SIGNIFICANT CHANGE UP (ref 0–0)
NRBC # FLD: 0 K/UL — SIGNIFICANT CHANGE UP (ref 0–0)
NRBC BLD AUTO-RTO: 0 /100 WBCS — SIGNIFICANT CHANGE UP (ref 0–0)
PLATELET # BLD AUTO: 176 K/UL — SIGNIFICANT CHANGE UP (ref 150–400)
PMV BLD: 9.7 FL — SIGNIFICANT CHANGE UP (ref 7–13)
POTASSIUM SERPL-MCNC: 4.1 MMOL/L — SIGNIFICANT CHANGE UP (ref 3.5–5.3)
POTASSIUM SERPL-SCNC: 4.1 MMOL/L — SIGNIFICANT CHANGE UP (ref 3.5–5.3)
PROTHROM AB SERPL-ACNC: 11.9 SEC — SIGNIFICANT CHANGE UP (ref 9.9–13.4)
RBC # BLD: 3.87 M/UL — LOW (ref 4.2–5.8)
RBC # FLD: 13.8 % — SIGNIFICANT CHANGE UP (ref 10.3–14.5)
SODIUM SERPL-SCNC: 139 MMOL/L — SIGNIFICANT CHANGE UP (ref 135–145)
WBC # BLD: 5.31 K/UL — SIGNIFICANT CHANGE UP (ref 3.8–10.5)
WBC # FLD AUTO: 5.31 K/UL — SIGNIFICANT CHANGE UP (ref 3.8–10.5)

## 2025-06-13 PROCEDURE — 86850 RBC ANTIBODY SCREEN: CPT

## 2025-06-13 PROCEDURE — 93010 ELECTROCARDIOGRAM REPORT: CPT

## 2025-06-13 PROCEDURE — 85730 THROMBOPLASTIN TIME PARTIAL: CPT

## 2025-06-13 PROCEDURE — 86901 BLOOD TYPING SEROLOGIC RH(D): CPT

## 2025-06-13 PROCEDURE — 85025 COMPLETE CBC W/AUTO DIFF WBC: CPT

## 2025-06-13 PROCEDURE — 86900 BLOOD TYPING SEROLOGIC ABO: CPT

## 2025-06-13 PROCEDURE — 80048 BASIC METABOLIC PNL TOTAL CA: CPT

## 2025-06-13 PROCEDURE — 85610 PROTHROMBIN TIME: CPT

## 2025-06-13 PROCEDURE — 93005 ELECTROCARDIOGRAM TRACING: CPT

## 2025-06-13 PROCEDURE — 99214 OFFICE O/P EST MOD 30 MIN: CPT | Mod: 25

## 2025-06-13 PROCEDURE — 36415 COLL VENOUS BLD VENIPUNCTURE: CPT

## 2025-06-13 NOTE — H&P PST ADULT - NEGATIVE ENMT SYMPTOMS
Patient answered NO to all of the above 3 questions.
no hearing difficulty/no ear pain/no tinnitus/no dry mouth/no throat pain/no dysphagia

## 2025-06-13 NOTE — H&P PST ADULT - ASSESSMENT
68 y/o male presents to [facility] for scheduled hemorrhoidectomy proctoplasty colonoscopy excision of rectal polyp on 6/23/25.

## 2025-06-13 NOTE — H&P PST ADULT - NSICDXPASTMEDICALHX_GEN_ALL_CORE_FT
PAST MEDICAL HISTORY:  Bleeding hemorrhoids     H/O cervical fracture     H/O ETOH abuse     HLD (hyperlipidemia)     Laceration of radial artery     Oral cancer     Oral leukoplakia     Psoriatic arthritis     Rib fracture     Schatzki's ring of distal esophagus

## 2025-06-13 NOTE — H&P PST ADULT - PROBLEM SELECTOR PLAN 1
Pre op and chlorhexidine instructions given and explained.  Avoid NSAIDs and OTC supplements.   Patient verbalized understanding  medical optimization requested by surgeon   continue doxycyline and folic acid on the DOS   cbc, bmp, type and screen, ptt/inr,  done today in PST

## 2025-06-13 NOTE — H&P PST ADULT - MUSCULOSKELETAL COMMENTS
arthritic changes to hands noted hx of psoriatic arthritis followed by rheumatologist on Otezla and doxycyline

## 2025-06-13 NOTE — H&P PST ADULT - HISTORY OF PRESENT ILLNESS
68 y/o male presents to [facility] for scheduled hemorrhoidectomy proctoplasty colonoscopy excision of rectal polyp on 6/23/25. Patient reports long history of hemorrhoids with occasional rectal bleeding seen by PMD and referred to surgeon. Patient also due for routine colonoscopy.

## 2025-06-14 DIAGNOSIS — Z01.818 ENCOUNTER FOR OTHER PREPROCEDURAL EXAMINATION: ICD-10-CM

## 2025-06-16 ENCOUNTER — RESULT REVIEW (OUTPATIENT)
Age: 70
End: 2025-06-16

## 2025-06-17 ENCOUNTER — RX RENEWAL (OUTPATIENT)
Age: 70
End: 2025-06-17

## 2025-06-18 PROBLEM — C06.9 MALIGNANT NEOPLASM OF MOUTH, UNSPECIFIED: Chronic | Status: ACTIVE | Noted: 2025-06-13

## 2025-06-23 ENCOUNTER — TRANSCRIPTION ENCOUNTER (OUTPATIENT)
Age: 70
End: 2025-06-23

## 2025-06-23 ENCOUNTER — OUTPATIENT (OUTPATIENT)
Dept: INPATIENT UNIT | Facility: HOSPITAL | Age: 70
LOS: 1 days | Discharge: HOME | End: 2025-06-23
Payer: MEDICARE

## 2025-06-23 ENCOUNTER — APPOINTMENT (OUTPATIENT)
Dept: COLORECTAL SURGERY | Facility: HOSPITAL | Age: 70
End: 2025-06-23

## 2025-06-23 ENCOUNTER — RESULT REVIEW (OUTPATIENT)
Age: 70
End: 2025-06-23

## 2025-06-23 VITALS
OXYGEN SATURATION: 100 % | DIASTOLIC BLOOD PRESSURE: 69 MMHG | RESPIRATION RATE: 14 BRPM | HEART RATE: 56 BPM | TEMPERATURE: 97 F | SYSTOLIC BLOOD PRESSURE: 101 MMHG

## 2025-06-23 VITALS
WEIGHT: 154.76 LBS | OXYGEN SATURATION: 99 % | SYSTOLIC BLOOD PRESSURE: 120 MMHG | HEIGHT: 62 IN | HEART RATE: 59 BPM | DIASTOLIC BLOOD PRESSURE: 72 MMHG | RESPIRATION RATE: 16 BRPM | TEMPERATURE: 98 F

## 2025-06-23 DIAGNOSIS — L40.50 ARTHROPATHIC PSORIASIS, UNSPECIFIED: ICD-10-CM

## 2025-06-23 DIAGNOSIS — K64.4 RESIDUAL HEMORRHOIDAL SKIN TAGS: ICD-10-CM

## 2025-06-23 DIAGNOSIS — Z86.0100 PERSONAL HISTORY OF COLON POLYPS, UNSPECIFIED: ICD-10-CM

## 2025-06-23 DIAGNOSIS — Z85.818 PERSONAL HISTORY OF MALIGNANT NEOPLASM OF OTHER SITES OF LIP, ORAL CAVITY, AND PHARYNX: ICD-10-CM

## 2025-06-23 DIAGNOSIS — K64.8 OTHER HEMORRHOIDS: ICD-10-CM

## 2025-06-23 DIAGNOSIS — K62.1 RECTAL POLYP: ICD-10-CM

## 2025-06-23 DIAGNOSIS — Z87.891 PERSONAL HISTORY OF NICOTINE DEPENDENCE: ICD-10-CM

## 2025-06-23 DIAGNOSIS — E78.5 HYPERLIPIDEMIA, UNSPECIFIED: ICD-10-CM

## 2025-06-23 DIAGNOSIS — Z98.890 OTHER SPECIFIED POSTPROCEDURAL STATES: Chronic | ICD-10-CM

## 2025-06-23 DIAGNOSIS — D12.3 BENIGN NEOPLASM OF TRANSVERSE COLON: ICD-10-CM

## 2025-06-23 DIAGNOSIS — F10.10 ALCOHOL ABUSE, UNCOMPLICATED: ICD-10-CM

## 2025-06-23 PROCEDURE — 88305 TISSUE EXAM BY PATHOLOGIST: CPT | Mod: 26

## 2025-06-23 PROCEDURE — 45172 EXC RECT TUM TRANSANAL FULL: CPT | Mod: AS

## 2025-06-23 PROCEDURE — 88305 TISSUE EXAM BY PATHOLOGIST: CPT

## 2025-06-23 PROCEDURE — 46260 REMOVE IN/EX HEM GROUPS 2+: CPT

## 2025-06-23 PROCEDURE — 45172 EXC RECT TUM TRANSANAL FULL: CPT

## 2025-06-23 RX ORDER — BUPIVACAINE 13.3 MG/ML
20 INJECTION, SUSPENSION, LIPOSOMAL INFILTRATION ONCE
Refills: 0 | Status: DISCONTINUED | OUTPATIENT
Start: 2025-06-23 | End: 2025-06-23

## 2025-06-23 RX ORDER — OXYCODONE HYDROCHLORIDE AND ACETAMINOPHEN 10; 325 MG/1; MG/1
1 TABLET ORAL
Qty: 20 | Refills: 0
Start: 2025-06-23

## 2025-06-23 NOTE — ASU DISCHARGE PLAN (ADULT/PEDIATRIC) - BATHING
Writer finished the triage and went to get provider to assess the patient.   Sitz bath (specify frequency)

## 2025-06-23 NOTE — BRIEF OPERATIVE NOTE - NSICDXBRIEFPREOP_GEN_ALL_CORE_FT
PRE-OP DIAGNOSIS:  Rectal polyp 23-Jun-2025 09:34:06  Markus Caballero  Hemorrhoids 23-Jun-2025 09:33:43  Markus Caballero

## 2025-06-23 NOTE — ASU DISCHARGE PLAN (ADULT/PEDIATRIC) - CARE PROVIDER_API CALL
Willie Murray  Colon & Rectal Surgery  [address] [place] [address], [facility] [place] [address]  Phone: [phone]  Fax: [phone]  Follow Up Time: 1 month

## 2025-06-23 NOTE — BRIEF OPERATIVE NOTE - COMMENTS
I, RUSSELL Caballero, was present with Dr. Murray for the entirety of the procedure, assisting during all key portions of the procedure, including the  skin closure aspect. For further details, please refer to his operative dictation.

## 2025-06-23 NOTE — BRIEF OPERATIVE NOTE - NSICDXBRIEFPROCEDURE_GEN_ALL_CORE_FT
Hospital Medicine Daily Progress Note    Date of Service  7/22/2019    Chief Complaint  39 y.o. male admitted 7/19/2019 with intractable seizures in the setting of severe TBI when younger and CP    Hospital Course    see below      Interval Problem Update  Seizures-cEEG in place. Per mom did better last night with no obvious seizures.     Consultants/Specialty  neurology    Code Status  fcfc    Disposition  Neurology    Review of Systems  Review of Systems   Unable to perform ROS: Acuity of condition        Physical Exam  Temp:  [36.2 °C (97.1 °F)-36.6 °C (97.8 °F)] 36.2 °C (97.2 °F)  Pulse:  [60-78] 66  Resp:  [16-20] 18  BP: (117-143)/(76-84) 143/84  SpO2:  [94 %-96 %] 95 %    Physical Exam   Constitutional: He appears well-developed and well-nourished. No distress.   Non verbal, does not respond to any commands   HENT:   Head: Normocephalic and atraumatic.   Mouth/Throat: No oropharyngeal exudate.   Trach mask   Eyes: Pupils are equal, round, and reactive to light. No scleral icterus.   Neck: Normal range of motion. Neck supple. No thyromegaly present.   Cardiovascular: Normal rate, regular rhythm, normal heart sounds and intact distal pulses.    No murmur heard.  Pulmonary/Chest: Effort normal and breath sounds normal. No respiratory distress. He has no wheezes.   Abdominal: Soft. Bowel sounds are normal. He exhibits no distension. There is no tenderness.   Thin  G tube site appears C/D/I   Musculoskeletal: Normal range of motion. He exhibits no tenderness.   Neurological: He is alert. No cranial nerve deficit. Coordination abnormal.   Contractures noted   Skin: Skin is warm and dry. No rash noted.   Nursing note and vitals reviewed.  No change in physical exam today on 7/22/19 compared to 7/21/19    Fluids    Intake/Output Summary (Last 24 hours) at 07/22/19 0936  Last data filed at 07/22/19 0600   Gross per 24 hour   Intake               40 ml   Output             1250 ml   Net            -1210 ml        Laboratory  Recent Labs      07/19/19   1404   WBC  6.4   RBC  5.49   HEMOGLOBIN  16.6   HEMATOCRIT  52.2*   MCV  95.1   MCH  30.2   MCHC  31.8*   RDW  62.9*   PLATELETCT  259   MPV  9.8     Recent Labs      07/19/19   1404  07/21/19   0351  07/22/19   0312   SODIUM  134*  134*  138   POTASSIUM  4.2  3.4*  3.5*   CHLORIDE  103  105  108   CO2  21  19*  21   GLUCOSE  82  101*  109*   BUN  17  12  16   CREATININE  0.24*  <0.20*  <0.20*   CALCIUM  8.9  8.7  8.7                   Imaging  DX-CHEST-PORTABLE (1 VIEW)   Final Result      The perihilar/left lung base atelectasis with pneumonitis/pneumonia not excluded.   Small left pleural effusion.           Assessment/Plan  * Status epilepticus (HCC)- (present on admission)   Assessment & Plan    Despite renal barbital, Keppra, Vimpat, and Topamax continues to have multiple seizures consistent with focal seizures  He will be admitted to the neurology floor and EEG has been ordered, increased dose of vimpat appears to be working at this time  Is been followed as an outpatient by Dr. Bolton, spoke with neurologist  -no clear evidence of infection at this time  -I strongly recommend an ethics consult and palliative care consult      Epilepsy (HCC)- (present on admission)   Assessment & Plan    Tenuous outpatient regimen prescribed by Dr. Bolton, long standing h/o seizures  -consider palliative care consult, remains mostly in a vegetative state  -known to have pharmaco-resistant seizures     TBI (traumatic brain injury) ([facility])- (present on admission)   Assessment & Plan    With history of tracheostomy  Continue his G-tube feeding per his mom's recommendations  Is mentation appears to be effectively baseline  He has a baclofen pain pump     Contraction, joint, multiple sites- (present on admission)   Assessment & Plan    Contractures are stable          VTE prophylaxis: lovenox       PROCEDURES:  Hemorrhoidectomy, internal and external, involving 1 anal column 23-Jun-2025 09:33:14  Markus Caballero  Colonoscopy with biopsy 23-Jun-2025 09:33:30  Markus Caballero  Exam under anesthesia, rectum, with rectal polypectomy 23-Jun-2025 09:34:42  Markus Caballero   No costovertebral angle tenderness

## 2025-06-23 NOTE — ASU DISCHARGE PLAN (ADULT/PEDIATRIC) - FINANCIAL ASSISTANCE
[facility] provides services at a reduced cost to those who are determined to be eligible through [facility]’s financial assistance program. Information regarding [facility]’s financial assistance program can be found by going to [email or web address] or by calling [phone].

## 2025-06-23 NOTE — BRIEF OPERATIVE NOTE - NSICDXBRIEFPOSTOP_GEN_ALL_CORE_FT
POST-OP DIAGNOSIS:  Hemorrhoids 23-Jun-2025 09:34:14  Markus Caballero  Rectal polyp 23-Jun-2025 09:34:11  Markus Caballero  Colon polyp 23-Jun-2025 09:34:27  Markus Caballero

## 2025-06-23 NOTE — ASU DISCHARGE PLAN (ADULT/PEDIATRIC) - NS MD DC FALL RISK RISK
For information on Fall & Injury Prevention, visit: [email or web address] OR  [email or web address] OR  https://www.cdc.gov/steadi/patient.html

## 2025-06-27 ENCOUNTER — NON-APPOINTMENT (OUTPATIENT)
Age: 70
End: 2025-06-27

## 2025-06-27 LAB — SURGICAL PATHOLOGY STUDY: SIGNIFICANT CHANGE UP

## 2025-07-21 ENCOUNTER — APPOINTMENT (OUTPATIENT)
Dept: COLORECTAL SURGERY | Facility: CLINIC | Age: 70
End: 2025-07-21
Payer: MEDICARE

## 2025-07-21 VITALS
WEIGHT: 153 LBS | HEART RATE: 65 BPM | SYSTOLIC BLOOD PRESSURE: 113 MMHG | DIASTOLIC BLOOD PRESSURE: 74 MMHG | RESPIRATION RATE: 14 BRPM | BODY MASS INDEX: 28.16 KG/M2 | HEIGHT: 62 IN | OXYGEN SATURATION: 96 %

## 2025-07-21 DIAGNOSIS — Z09 ENCOUNTER FOR FOLLOW-UP EXAMINATION AFTER COMPLETED TREATMENT FOR CONDITIONS OTHER THAN MALIGNANT NEOPLASM: ICD-10-CM

## 2025-07-21 PROCEDURE — 99024 POSTOP FOLLOW-UP VISIT: CPT

## 2025-07-28 ENCOUNTER — APPOINTMENT (OUTPATIENT)
Dept: RHEUMATOLOGY | Facility: CLINIC | Age: 70
End: 2025-07-28
Payer: MEDICARE

## 2025-07-28 VITALS
DIASTOLIC BLOOD PRESSURE: 66 MMHG | BODY MASS INDEX: 27.6 KG/M2 | HEART RATE: 63 BPM | OXYGEN SATURATION: 98 % | WEIGHT: 150 LBS | TEMPERATURE: 97 F | SYSTOLIC BLOOD PRESSURE: 88 MMHG | HEIGHT: 62 IN

## 2025-07-28 DIAGNOSIS — Z51.81 ENCOUNTER FOR THERAPEUTIC DRUG LVL MONITORING: ICD-10-CM

## 2025-07-28 DIAGNOSIS — L40.50 ARTHROPATHIC PSORIASIS, UNSPECIFIED: ICD-10-CM

## 2025-07-28 DIAGNOSIS — Z79.899 OTHER LONG TERM (CURRENT) DRUG THERAPY: ICD-10-CM

## 2025-07-28 DIAGNOSIS — E54 ASCORBIC ACID DEFICIENCY: ICD-10-CM

## 2025-07-28 LAB
ALBUMIN SERPL ELPH-MCNC: 4.1 G/DL
ALP BLD-CCNC: 104 U/L
ALT SERPL-CCNC: 17 U/L
ANION GAP SERPL CALC-SCNC: 12 MMOL/L
AST SERPL-CCNC: 22 U/L
BILIRUB SERPL-MCNC: 0.6 MG/DL
BUN SERPL-MCNC: 17 MG/DL
CALCIUM SERPL-MCNC: 9.6 MG/DL
CHLORIDE SERPL-SCNC: 106 MMOL/L
CO2 SERPL-SCNC: 22 MMOL/L
CREAT SERPL-MCNC: 0.7 MG/DL
CRP SERPL-MCNC: <3 MG/L
EGFRCR SERPLBLD CKD-EPI 2021: 99 ML/MIN/1.73M2
ERYTHROCYTE [SEDIMENTATION RATE] IN BLOOD BY WESTERGREN METHOD: 21 MM/HR
HCT VFR BLD CALC: 36.9 %
HGB BLD-MCNC: 11.8 G/DL
MCHC RBC-ENTMCNC: 28.4 PG
MCHC RBC-ENTMCNC: 32 G/DL
MCV RBC AUTO: 88.9 FL
PLATELET # BLD AUTO: 183 K/UL
POTASSIUM SERPL-SCNC: 4.2 MMOL/L
PROT SERPL-MCNC: 7 G/DL
RBC # BLD: 4.15 M/UL
RBC # FLD: 12.9 %
SODIUM SERPL-SCNC: 139 MMOL/L
WBC # FLD AUTO: 5.19 K/UL

## 2025-07-28 PROCEDURE — 99214 OFFICE O/P EST MOD 30 MIN: CPT

## 2025-07-28 PROCEDURE — G2211 COMPLEX E/M VISIT ADD ON: CPT

## 2025-07-29 LAB
HAV IGM SER QL: NONREACTIVE
HBV CORE IGG+IGM SER QL: NONREACTIVE
HBV CORE IGM SER QL: NONREACTIVE
HBV SURFACE AG SER QL: NONREACTIVE
HCV AB SER QL: NONREACTIVE
HCV S/CO RATIO: 0.2 S/CO

## 2025-07-30 LAB
M TB IFN-G BLD-IMP: NEGATIVE
QUANTIFERON TB PLUS MITOGEN MINUS NIL: >10 IU/ML
QUANTIFERON TB PLUS NIL: 0.03 IU/ML
QUANTIFERON TB PLUS TB1 MINUS NIL: 0 IU/ML
QUANTIFERON TB PLUS TB2 MINUS NIL: 0 IU/ML

## 2025-07-31 ENCOUNTER — APPOINTMENT (OUTPATIENT)
Dept: UROLOGY | Facility: CLINIC | Age: 70
End: 2025-07-31
Payer: MEDICARE

## 2025-07-31 VITALS
HEIGHT: 62 IN | HEART RATE: 56 BPM | SYSTOLIC BLOOD PRESSURE: 98 MMHG | DIASTOLIC BLOOD PRESSURE: 61 MMHG | BODY MASS INDEX: 27.6 KG/M2 | WEIGHT: 150 LBS | OXYGEN SATURATION: 99 % | RESPIRATION RATE: 16 BRPM

## 2025-07-31 DIAGNOSIS — Z12.5 ENCOUNTER FOR SCREENING FOR MALIGNANT NEOPLASM OF PROSTATE: ICD-10-CM

## 2025-07-31 DIAGNOSIS — N40.1 BENIGN PROSTATIC HYPERPLASIA WITH LOWER URINARY TRACT SYMPMS: ICD-10-CM

## 2025-07-31 DIAGNOSIS — R39.15 URGENCY OF URINATION: ICD-10-CM

## 2025-07-31 PROCEDURE — 99213 OFFICE O/P EST LOW 20 MIN: CPT

## 2025-07-31 PROCEDURE — 51798 US URINE CAPACITY MEASURE: CPT

## 2025-08-04 LAB — VIT C SERPL-MCNC: 1 MG/DL

## 2025-08-20 ENCOUNTER — RX RENEWAL (OUTPATIENT)
Age: 70
End: 2025-08-20

## 2025-08-22 ENCOUNTER — RX RENEWAL (OUTPATIENT)
Age: 70
End: 2025-08-22

## (undated) DEVICE — LABELS BLANK W PEN

## (undated) DEVICE — SUT VICRYL 4-0 27" RB-1 UNDYED

## (undated) DEVICE — WARMING BLANKET LOWER ADULT

## (undated) DEVICE — SUT SILK 2-0 30" SH

## (undated) DEVICE — DRSG TELFA 3 X 8

## (undated) DEVICE — POSITIONER FOAM EGG CRATE ULNAR 2PCS (PINK)

## (undated) DEVICE — STRYKER COLORADO N-SERIES 3CM STRAIGHT

## (undated) DEVICE — GLV 7.5 PROTEXIS (CREAM) MICRO

## (undated) DEVICE — DRAPE MAGNETIC INSTRUMENT MEDIUM

## (undated) DEVICE — CANISTER DISPOSABLE THIN WALL 3000CC

## (undated) DEVICE — PROTECTOR HEEL / ELBOW FLUFFY

## (undated) DEVICE — ELCTR GROUNDING PAD ADULT COVIDIEN

## (undated) DEVICE — VENODYNE/SCD SLEEVE CALF MEDIUM

## (undated) DEVICE — DRAPE SPLIT SHEET 77" X 120"

## (undated) DEVICE — LIJ-ZIMMER MESHGRAFTER: Type: DURABLE MEDICAL EQUIPMENT

## (undated) DEVICE — DRAPE 3/4 SHEET 52X76"

## (undated) DEVICE — PACK HEAD & NECK

## (undated) DEVICE — ELCTR BOVIE TIP NEEDLE INSULATED 2.8" EDGE

## (undated) DEVICE — SOL IRR POUR H2O 500ML